# Patient Record
Sex: FEMALE | Race: WHITE | NOT HISPANIC OR LATINO | Employment: UNEMPLOYED | URBAN - METROPOLITAN AREA
[De-identification: names, ages, dates, MRNs, and addresses within clinical notes are randomized per-mention and may not be internally consistent; named-entity substitution may affect disease eponyms.]

---

## 2022-10-12 ENCOUNTER — TELEPHONE (OUTPATIENT)
Dept: SPORTS MEDICINE | Facility: CLINIC | Age: 67
End: 2022-10-12
Payer: MEDICARE

## 2022-10-12 NOTE — TELEPHONE ENCOUNTER
----- Message from Adam Gilbert sent at 10/12/2022  2:36 PM CDT -----  Regarding: PT'S REQUESTING A CALL REGARDING SCHEDULING FOR RIGHT LEG PAIN  Contact: PT  Pt's requesting call back...     .Confirmed contact info below:  Contact Name: Karlie Carrion  Phone Number: [phone]

## 2022-10-12 NOTE — TELEPHONE ENCOUNTER
Called and spoke to patient.  She had knee surgery about 30 years ago and is starting to have issues again.  She would like to get this looked at and Dr. Huggins was recommended

## 2022-10-17 ENCOUNTER — TELEPHONE (OUTPATIENT)
Dept: SPORTS MEDICINE | Facility: CLINIC | Age: 67
End: 2022-10-17
Payer: MEDICARE

## 2022-10-17 NOTE — TELEPHONE ENCOUNTER
Called and spoke to patient.  She had surgery for torn mensicus 30 years ago. Patient states she hurt her knee in April and wanted to seek a non surgical opinion.  Patient will arrive at 10:15am to have xray done.

## 2022-10-17 NOTE — TELEPHONE ENCOUNTER
----- Message from Adam Gilbert sent at 10/17/2022  3:55 PM CDT -----  Regarding: PT HAD A TORN MENISCUS IN RIGHT KNEE  Contact: PT  Pt's returning a call from staff..     Confirmed contact info below:  Contact Name: Karlie Carrion  Phone Number: [phone]

## 2022-10-18 DIAGNOSIS — M25.561 PAIN IN BOTH KNEES, UNSPECIFIED CHRONICITY: Primary | ICD-10-CM

## 2022-10-18 DIAGNOSIS — M25.562 PAIN IN BOTH KNEES, UNSPECIFIED CHRONICITY: Primary | ICD-10-CM
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
## 2022-12-01 ENCOUNTER — TELEPHONE (OUTPATIENT)
Dept: PRIMARY CARE CLINIC | Facility: CLINIC | Age: 67
End: 2022-12-01

## 2022-12-01 ENCOUNTER — OFFICE VISIT (OUTPATIENT)
Dept: PRIMARY CARE CLINIC | Facility: CLINIC | Age: 67
End: 2022-12-01
Payer: MEDICARE

## 2022-12-01 VITALS
DIASTOLIC BLOOD PRESSURE: 72 MMHG | HEART RATE: 55 BPM | BODY MASS INDEX: 25.97 KG/M2 | HEIGHT: 62 IN | SYSTOLIC BLOOD PRESSURE: 136 MMHG | TEMPERATURE: 98 F | WEIGHT: 141.13 LBS | OXYGEN SATURATION: 98 %

## 2022-12-01 DIAGNOSIS — N95.9 UNSPECIFIED MENOPAUSAL AND PERIMENOPAUSAL DISORDER: ICD-10-CM

## 2022-12-01 DIAGNOSIS — R53.83 FATIGUE, UNSPECIFIED TYPE: ICD-10-CM

## 2022-12-01 DIAGNOSIS — Z12.39 ENCOUNTER FOR SCREENING FOR MALIGNANT NEOPLASM OF BREAST, UNSPECIFIED SCREENING MODALITY: ICD-10-CM

## 2022-12-01 DIAGNOSIS — D22.9 MULTIPLE ATYPICAL SKIN MOLES: ICD-10-CM

## 2022-12-01 DIAGNOSIS — Z78.0 MENOPAUSE: ICD-10-CM

## 2022-12-01 DIAGNOSIS — I83.93 VARICOSE VEINS OF BOTH LOWER EXTREMITIES, UNSPECIFIED WHETHER COMPLICATED: ICD-10-CM

## 2022-12-01 DIAGNOSIS — M84.371S STRESS FRACTURE OF RIGHT ANKLE, SEQUELA: ICD-10-CM

## 2022-12-01 DIAGNOSIS — Z76.89 ESTABLISHING CARE WITH NEW DOCTOR, ENCOUNTER FOR: Primary | ICD-10-CM

## 2022-12-01 DIAGNOSIS — R79.9 ABNORMAL FINDING OF BLOOD CHEMISTRY, UNSPECIFIED: ICD-10-CM

## 2022-12-01 DIAGNOSIS — R94.7 ABNORMAL RESULTS OF OTHER ENDOCRINE FUNCTION STUDIES: ICD-10-CM

## 2022-12-01 DIAGNOSIS — F17.211 NICOTINE DEPENDENCE, CIGARETTES, IN REMISSION: ICD-10-CM

## 2022-12-01 DIAGNOSIS — Z87.891 FORMER SMOKER: ICD-10-CM

## 2022-12-01 DIAGNOSIS — R73.03 PREDIABETES: ICD-10-CM

## 2022-12-01 DIAGNOSIS — Z12.31 ENCOUNTER FOR SCREENING MAMMOGRAM FOR MALIGNANT NEOPLASM OF BREAST: ICD-10-CM

## 2022-12-01 PROBLEM — M84.371A STRESS FRACTURE OF RIGHT ANKLE: Status: ACTIVE | Noted: 2022-12-01

## 2022-12-01 PROCEDURE — 99215 OFFICE O/P EST HI 40 MIN: CPT | Mod: PBBFAC,PN | Performed by: STUDENT IN AN ORGANIZED HEALTH CARE EDUCATION/TRAINING PROGRAM

## 2022-12-01 PROCEDURE — 99999 PR PBB SHADOW E&M-EST. PATIENT-LVL V: CPT | Mod: PBBFAC,,, | Performed by: STUDENT IN AN ORGANIZED HEALTH CARE EDUCATION/TRAINING PROGRAM

## 2022-12-01 PROCEDURE — 99999 PR PBB SHADOW E&M-EST. PATIENT-LVL V: ICD-10-PCS | Mod: PBBFAC,,, | Performed by: STUDENT IN AN ORGANIZED HEALTH CARE EDUCATION/TRAINING PROGRAM

## 2022-12-01 PROCEDURE — 99204 OFFICE O/P NEW MOD 45 MIN: CPT | Mod: S$PBB,,, | Performed by: STUDENT IN AN ORGANIZED HEALTH CARE EDUCATION/TRAINING PROGRAM

## 2022-12-01 PROCEDURE — 99204 PR OFFICE/OUTPT VISIT, NEW, LEVL IV, 45-59 MIN: ICD-10-PCS | Mod: S$PBB,,, | Performed by: STUDENT IN AN ORGANIZED HEALTH CARE EDUCATION/TRAINING PROGRAM

## 2022-12-01 NOTE — TELEPHONE ENCOUNTER
----- Message from Anu Ashraf sent at 12/1/2022  3:32 PM CST -----  Pt is wanting all her referral test put in today done at [facility] will you please send the external referrals to [facility] please.    Thanks

## 2022-12-06 ENCOUNTER — PATIENT MESSAGE (OUTPATIENT)
Dept: PRIMARY CARE CLINIC | Facility: CLINIC | Age: 67
End: 2022-12-06
Payer: MEDICARE

## 2022-12-06 ENCOUNTER — LAB VISIT (OUTPATIENT)
Dept: LAB | Facility: HOSPITAL | Age: 67
End: 2022-12-06
Payer: MEDICARE

## 2022-12-06 DIAGNOSIS — E78.5 HYPERLIPIDEMIA, UNSPECIFIED HYPERLIPIDEMIA TYPE: Primary | ICD-10-CM

## 2022-12-06 DIAGNOSIS — R94.7 ABNORMAL RESULTS OF OTHER ENDOCRINE FUNCTION STUDIES: ICD-10-CM

## 2022-12-06 DIAGNOSIS — Z76.89 ESTABLISHING CARE WITH NEW DOCTOR, ENCOUNTER FOR: ICD-10-CM

## 2022-12-06 DIAGNOSIS — M84.371S STRESS FRACTURE OF RIGHT ANKLE, SEQUELA: ICD-10-CM

## 2022-12-06 DIAGNOSIS — R73.03 PREDIABETES: ICD-10-CM

## 2022-12-06 DIAGNOSIS — R79.9 ABNORMAL FINDING OF BLOOD CHEMISTRY, UNSPECIFIED: ICD-10-CM

## 2022-12-06 DIAGNOSIS — R53.83 FATIGUE, UNSPECIFIED TYPE: ICD-10-CM

## 2022-12-06 LAB
25(OH)D3+25(OH)D2 SERPL-MCNC: 16 NG/ML (ref 30–96)
ALBUMIN SERPL BCP-MCNC: 3.8 G/DL (ref 3.5–5.2)
ALP SERPL-CCNC: 75 U/L (ref 55–135)
ALT SERPL W/O P-5'-P-CCNC: 23 U/L (ref 10–44)
ANION GAP SERPL CALC-SCNC: 8 MMOL/L (ref 8–16)
AST SERPL-CCNC: 20 U/L (ref 10–40)
BASOPHILS # BLD AUTO: 0.07 K/UL (ref 0–0.2)
BASOPHILS NFR BLD: 0.9 % (ref 0–1.9)
BILIRUB SERPL-MCNC: 0.8 MG/DL (ref 0.1–1)
BUN SERPL-MCNC: 15 MG/DL (ref 8–23)
CALCIUM SERPL-MCNC: 9.6 MG/DL (ref 8.7–10.5)
CHLORIDE SERPL-SCNC: 108 MMOL/L (ref 95–110)
CHOLEST SERPL-MCNC: 266 MG/DL (ref 120–199)
CHOLEST/HDLC SERPL: 6.2 {RATIO} (ref 2–5)
CO2 SERPL-SCNC: 23 MMOL/L (ref 23–29)
CREAT SERPL-MCNC: 0.8 MG/DL (ref 0.5–1.4)
DIFFERENTIAL METHOD: NORMAL
EOSINOPHIL # BLD AUTO: 0.1 K/UL (ref 0–0.5)
EOSINOPHIL NFR BLD: 1.1 % (ref 0–8)
ERYTHROCYTE [DISTWIDTH] IN BLOOD BY AUTOMATED COUNT: 12.3 % (ref 11.5–14.5)
EST. GFR  (NO RACE VARIABLE): >60 ML/MIN/1.73 M^2
ESTIMATED AVG GLUCOSE: 105 MG/DL (ref 68–131)
GLUCOSE SERPL-MCNC: 88 MG/DL (ref 70–110)
HBA1C MFR BLD: 5.3 % (ref 4–5.6)
HCT VFR BLD AUTO: 43.7 % (ref 37–48.5)
HCV AB SERPL QL IA: NORMAL
HDLC SERPL-MCNC: 43 MG/DL (ref 40–75)
HDLC SERPL: 16.2 % (ref 20–50)
HGB BLD-MCNC: 14.3 G/DL (ref 12–16)
HIV 1+2 AB+HIV1 P24 AG SERPL QL IA: NORMAL
IMM GRANULOCYTES # BLD AUTO: 0.02 K/UL (ref 0–0.04)
IMM GRANULOCYTES NFR BLD AUTO: 0.2 % (ref 0–0.5)
LDLC SERPL CALC-MCNC: 197.2 MG/DL (ref 63–159)
LYMPHOCYTES # BLD AUTO: 2.8 K/UL (ref 1–4.8)
LYMPHOCYTES NFR BLD: 34.6 % (ref 18–48)
MCH RBC QN AUTO: 30.1 PG (ref 27–31)
MCHC RBC AUTO-ENTMCNC: 32.7 G/DL (ref 32–36)
MCV RBC AUTO: 92 FL (ref 82–98)
MONOCYTES # BLD AUTO: 0.6 K/UL (ref 0.3–1)
MONOCYTES NFR BLD: 7.3 % (ref 4–15)
NEUTROPHILS # BLD AUTO: 4.5 K/UL (ref 1.8–7.7)
NEUTROPHILS NFR BLD: 55.9 % (ref 38–73)
NONHDLC SERPL-MCNC: 223 MG/DL
NRBC BLD-RTO: 0 /100 WBC
PLATELET # BLD AUTO: 260 K/UL (ref 150–450)
PMV BLD AUTO: 11.2 FL (ref 9.2–12.9)
POTASSIUM SERPL-SCNC: 3.9 MMOL/L (ref 3.5–5.1)
PROT SERPL-MCNC: 6.9 G/DL (ref 6–8.4)
RBC # BLD AUTO: 4.75 M/UL (ref 4–5.4)
SODIUM SERPL-SCNC: 139 MMOL/L (ref 136–145)
TRIGL SERPL-MCNC: 129 MG/DL (ref 30–150)
TSH SERPL DL<=0.005 MIU/L-ACNC: 1.68 UIU/ML (ref 0.4–4)
WBC # BLD AUTO: 8.11 K/UL (ref 3.9–12.7)

## 2022-12-06 PROCEDURE — 80061 LIPID PANEL: CPT | Performed by: STUDENT IN AN ORGANIZED HEALTH CARE EDUCATION/TRAINING PROGRAM

## 2022-12-06 PROCEDURE — 82306 VITAMIN D 25 HYDROXY: CPT | Performed by: STUDENT IN AN ORGANIZED HEALTH CARE EDUCATION/TRAINING PROGRAM

## 2022-12-06 PROCEDURE — 85025 COMPLETE CBC W/AUTO DIFF WBC: CPT | Performed by: STUDENT IN AN ORGANIZED HEALTH CARE EDUCATION/TRAINING PROGRAM

## 2022-12-06 PROCEDURE — 87389 HIV-1 AG W/HIV-1&-2 AB AG IA: CPT | Performed by: STUDENT IN AN ORGANIZED HEALTH CARE EDUCATION/TRAINING PROGRAM

## 2022-12-06 PROCEDURE — 36415 COLL VENOUS BLD VENIPUNCTURE: CPT | Mod: PN | Performed by: STUDENT IN AN ORGANIZED HEALTH CARE EDUCATION/TRAINING PROGRAM

## 2022-12-06 PROCEDURE — 83036 HEMOGLOBIN GLYCOSYLATED A1C: CPT | Performed by: STUDENT IN AN ORGANIZED HEALTH CARE EDUCATION/TRAINING PROGRAM

## 2022-12-06 PROCEDURE — 80053 COMPREHEN METABOLIC PANEL: CPT | Performed by: STUDENT IN AN ORGANIZED HEALTH CARE EDUCATION/TRAINING PROGRAM

## 2022-12-06 PROCEDURE — 84443 ASSAY THYROID STIM HORMONE: CPT | Performed by: STUDENT IN AN ORGANIZED HEALTH CARE EDUCATION/TRAINING PROGRAM

## 2022-12-06 PROCEDURE — 86803 HEPATITIS C AB TEST: CPT | Performed by: STUDENT IN AN ORGANIZED HEALTH CARE EDUCATION/TRAINING PROGRAM

## 2022-12-06 RX ORDER — ATORVASTATIN CALCIUM 40 MG/1
40 TABLET, FILM COATED ORAL NIGHTLY
Qty: 90 TABLET | Refills: 0 | Status: SHIPPED | OUTPATIENT
Start: 2022-12-06 | End: 2022-12-07

## 2022-12-06 NOTE — PROGRESS NOTES
"Hello,     Based on your recent labs, Vitamin-D is low. It is recommended that you take 1000 units (IU) daily of vitamin D3; this is available over-the-counter.      Your total and "bad cholesterol" (LDL) is high. It is recommended that you start a cholesterol medication based on these labs to help prevent things in the future like heart attacks and strokes.     One possible side effect of cholesterol medication is muscle aches but please keep in mind that most people are able to tolerate the cholesterol medication (called a statin) well.    Taking the cholesterol medication at bedtime minimizes the risk of muscle aches. If you experience this, sometimes adding an OTC CoQ-10 supplement can help as well. If you are having muscle aches please let me know as we can try different versions of the medication to help. We can also try a medicine called zetia which works differently than a statin.    Taking the cholesterol medication does not replace the need to diet and exercise so please continue to keep up with healthy habits. Foods like nuts, avocados, and salmon, can help with raising "good cholesterol." Increased fiber, fresh veggies, fruits, and limiting alcohol, fatty meats/dairy, processed carbs can help lower "bad" cholesterol as well. Exercise is advised as well. Stop smoking if you are.     If you agree to starting the cholesterol medication; please let us know. I could send a prescription to your pharmacy (may substitute based on affordability). We usually check your labs in 1 month to make sure you are doing well and then check the lipids in 6m and again in 12m and then yearly thereafter once controlled. The labs in 1 month are ordered. We can adjust the dose/add as needed to provide the most protection. Anyone who is a diabetic should be on one of the medications for protection as well.    Please let me know if you have questions about this.        Narinder,     Dr. Acosta"

## 2022-12-07 ENCOUNTER — TELEPHONE (OUTPATIENT)
Dept: PRIMARY CARE CLINIC | Facility: CLINIC | Age: 67
End: 2022-12-07
Payer: MEDICARE

## 2022-12-07 NOTE — TELEPHONE ENCOUNTER
Pt reports GI SE from statins in the past. Will try lifestyle and re-check 6m. Consider zetia in future. Pt would prefer non pharmaceutical options.

## 2022-12-07 NOTE — TELEPHONE ENCOUNTER
----- Message from Almaz Astudillo sent at 12/7/2022  3:55 PM CST -----  Contact: Anitra with [facility]  ref#[id number]  Anitra states she received a Cologuard order for the pt and has a question about the ICD 10 code. Please call and advise.

## 2022-12-13 LAB
BCS RECOMMENDATION EXT: NORMAL
BMD RECOMMENDATION EXT: NORMAL

## 2022-12-22 ENCOUNTER — PATIENT MESSAGE (OUTPATIENT)
Dept: ADMINISTRATIVE | Facility: HOSPITAL | Age: 67
End: 2022-12-22
Payer: MEDICARE

## 2023-01-03 LAB — NONINV COLON CA DNA+OCC BLD SCRN STL QL: POSITIVE

## 2023-01-04 ENCOUNTER — PATIENT MESSAGE (OUTPATIENT)
Dept: PRIMARY CARE CLINIC | Facility: CLINIC | Age: 68
End: 2023-01-04
Payer: MEDICARE

## 2023-01-04 DIAGNOSIS — R19.5 POSITIVE COLORECTAL CANCER SCREENING USING COLOGUARD TEST: Primary | ICD-10-CM

## 2023-01-04 NOTE — PROGRESS NOTES
Hi,     Could you please contact the patient and let them know that their results?    Her cologuard was positive. This means the test found something and she should have a diagnostic colonoscopy. I will place that order.     Is she having any symptoms of blood in stools, weight loss, abdominal pain, change in Bms?    Dr. Acosta

## 2023-01-05 ENCOUNTER — TELEPHONE (OUTPATIENT)
Dept: ENDOSCOPY | Facility: HOSPITAL | Age: 68
End: 2023-01-05
Payer: MEDICARE

## 2023-01-08 ENCOUNTER — PATIENT MESSAGE (OUTPATIENT)
Dept: PRIMARY CARE CLINIC | Facility: CLINIC | Age: 68
End: 2023-01-08
Payer: MEDICARE

## 2023-01-13 ENCOUNTER — PATIENT OUTREACH (OUTPATIENT)
Dept: ADMINISTRATIVE | Facility: HOSPITAL | Age: 68
End: 2023-01-13
Payer: MEDICARE

## 2023-01-13 NOTE — PROGRESS NOTES
Health Maintenance Due   Topic Date Due    TETANUS VACCINE  Never done    LDCT Lung Screen  Never done    Shingles Vaccine (1 of 2) Never done    Pneumococcal Vaccines (Age 65+) (1 - PCV) Never done      Chart review done.   HM updated.   Immunizations reviewed & updated.   Care Everywhere updated.  [facility] reviewed  Orders Entered:  NA

## 2023-01-18 ENCOUNTER — TELEPHONE (OUTPATIENT)
Dept: PRIMARY CARE CLINIC | Facility: CLINIC | Age: 68
End: 2023-01-18

## 2023-01-18 NOTE — TELEPHONE ENCOUNTER
Pt advised per Dr. Acosta's message below:    Her bone density did show some osteoporosis. Sometimes we use medicines for this but I know she prefers to do things without pills. If she does want to consider treatment please do let me know. To help, calcium, vitamin D, and weight-bearing exercise are advised.     Pt advised that she recently started taking Vitamin D & Calcium again but is unable to do weight bearing exercise due to broken toes. Pt is willing to discuss osteoporosis treatment at  on 01/20/23. Pt is also willing to discuss statin therapy if her methods of lowering cholesterol without medication is unsuccessful. She did  statin but does not plan to take it yet.     Pt saw Dr. Matute today regarding the colonoscopy. He sent to cardiology for clearance due to SOB & cough prior to scheduling the colonoscopy. Pt seeing Cardiologist tomorrow Dr. Ala M Mohsen.    Saw Dr. Matute today

## 2023-01-18 NOTE — TELEPHONE ENCOUNTER
----- Message from Neelima Judd sent at 1/18/2023  4:35 PM CST -----  Contact: pt [phone]  Patient is returning a phone call.  Who left a message for the patient: pt not sure  Does patient know what this is regarding:  test results  Would you like a call back, or a response through your [facility] portal?:   call  Comments:

## 2023-01-20 ENCOUNTER — OFFICE VISIT (OUTPATIENT)
Dept: PRIMARY CARE CLINIC | Facility: CLINIC | Age: 68
End: 2023-01-20
Payer: MEDICARE

## 2023-01-20 DIAGNOSIS — E78.5 HYPERLIPIDEMIA, UNSPECIFIED HYPERLIPIDEMIA TYPE: Primary | ICD-10-CM

## 2023-01-20 DIAGNOSIS — M81.0 AGE-RELATED OSTEOPOROSIS WITHOUT CURRENT PATHOLOGICAL FRACTURE: ICD-10-CM

## 2023-01-20 DIAGNOSIS — R19.5 POSITIVE COLORECTAL CANCER SCREENING USING COLOGUARD TEST: ICD-10-CM

## 2023-01-20 DIAGNOSIS — J45.20 MILD INTERMITTENT REACTIVE AIRWAY DISEASE WITHOUT COMPLICATION: ICD-10-CM

## 2023-01-20 DIAGNOSIS — M84.371S STRESS FRACTURE OF RIGHT ANKLE, SEQUELA: ICD-10-CM

## 2023-01-20 DIAGNOSIS — Z87.891 FORMER SMOKER: ICD-10-CM

## 2023-01-20 PROCEDURE — 99213 OFFICE O/P EST LOW 20 MIN: CPT | Mod: 95,,, | Performed by: STUDENT IN AN ORGANIZED HEALTH CARE EDUCATION/TRAINING PROGRAM

## 2023-01-20 PROCEDURE — 99213 PR OFFICE/OUTPT VISIT, EST, LEVL III, 20-29 MIN: ICD-10-PCS | Mod: 95,,, | Performed by: STUDENT IN AN ORGANIZED HEALTH CARE EDUCATION/TRAINING PROGRAM

## 2023-01-20 RX ORDER — ALBUTEROL SULFATE 90 UG/1
2 AEROSOL, METERED RESPIRATORY (INHALATION) EVERY 6 HOURS PRN
Qty: 18 G | Refills: 3 | Status: SHIPPED | OUTPATIENT
Start: 2023-01-20 | End: 2023-02-28

## 2023-01-20 RX ORDER — ATORVASTATIN CALCIUM 40 MG/1
40 TABLET, FILM COATED ORAL
COMMUNITY
Start: 2022-12-06 | End: 2023-03-03

## 2023-01-20 NOTE — PROGRESS NOTES
The patient location is: [place]  The chief complaint leading to consultation is: Abnormal DEXA     Visit type: audiovisual    Karlie Carrion  1955        Subjective     Chief Complaint: Osteoporosis    History of Present Illness:  Ms. Karlie Carrion is a 67 y.o. female who presents for virtual visit for follow-up.    Her DEXA done at outside facility showed osteoporosis.  She currently has a stress fracture that she is working with specialist for.  Does not think this was related to an injury.  Thinks her grandmother may have had this as well.  Long discussion regarding treatment options.  Risks and benefits.  She is currently taking calcium and vitamin-D.  Has been given the clearance to swim from her specialist.  Still nonweightbearing on that foot.    In terms of her high cholesterol.  States she did have a few months where she was eating much more than she should have, fried foods, cakes, sweets periods has changed her diet.  Has atorvastatin 40, not yet taken.  In the past she has had diarrhea with statins.  Feels it flared her IBS.  She can not recall which statin she had in the past that caused this.  She is open to trying the atorvastatin.  Discussed possible side effects of myalgias.  Can try with Co Q10 over-the-counter if needed, can also switch different brand, can also try Zetia.    She is a former smoker.  Feels she has a cough since having an upper respiratory infection few months ago.  Has been on albuterol in the past which helped.    At her lung CT scan and AAA screen done at [facility], we will try and obtain records.    Review of Systems   Constitutional:  Negative for chills, fever, malaise/fatigue and weight loss.   HENT:  Negative for hearing loss.    Eyes:  Negative for discharge.   Respiratory:  Negative for wheezing.    Cardiovascular:  Negative for chest pain and palpitations.   Gastrointestinal:  Negative for blood in stool, constipation, diarrhea and vomiting.   Genitourinary:  Negative for  dysuria and hematuria.   Musculoskeletal:  Negative for neck pain.   Neurological:  Negative for weakness and headaches.   Endo/Heme/Allergies:  Negative for polydipsia.      PAST HISTORY:     Past Medical History:   Diagnosis Date    HLD (hyperlipidemia)     Osteoporosis        No past surgical history on file.    No family history on file.      MEDICATIONS & ALLERGIES:     Current Outpatient Medications on File Prior to Visit   Medication Sig    atorvastatin (LIPITOR) 40 MG tablet Take 40 mg by mouth.     No current facility-administered medications on file prior to visit.       Review of patient's allergies indicates:   Allergen Reactions    Hay fever and allergy relief     Statins-hmg-coa reductase inhibitors Diarrhea and Nausea And Vomiting     GI issues       OBJECTIVE:     There is no height or weight on file to calculate BMI.     Physical Exam:  Physical Exam  Constitutional:       General: She is not in acute distress.     Appearance: Normal appearance. She is not ill-appearing, toxic-appearing or diaphoretic.      Comments: Limited 2/2 Virtual Exam   HENT:      Head: Normocephalic and atraumatic.   Eyes:      Conjunctiva/sclera: Conjunctivae normal.   Pulmonary:      Effort: Pulmonary effort is normal. No respiratory distress.   Neurological:      Mental Status: She is alert and oriented to person, place, and time. Mental status is at baseline.   Psychiatric:         Mood and Affect: Mood normal.         Behavior: Behavior normal.          Laboratory  Lab Results   Component Value Date    WBC 8.11 12/06/2022    HGB 14.3 12/06/2022    HCT 43.7 12/06/2022    MCV 92 12/06/2022     12/06/2022     Lab Results   Component Value Date    GLU 88 12/06/2022     12/06/2022    K 3.9 12/06/2022     12/06/2022    CO2 23 12/06/2022    BUN 15 12/06/2022    CREATININE 0.8 12/06/2022    CALCIUM 9.6 12/06/2022     No results found for: INR, PROTIME  Lab Results   Component Value Date    HGBA1C 5.3  12/06/2022             ASSESSMENT & PLAN:   Ms. Karlie Carrion is a 67 y.o. female who was seen today for follow-up.      1. Hyperlipidemia, unspecified hyperlipidemia type  -     Comprehensive Metabolic Panel; Future; Expected date: 02/20/2023  - long discussion today regarding risks and benefits of statin.  Patient is willing to try it.  She will let me know how she is doing with it.  Can try over-the-counter Co Q10.  We will check LFTs in 4-6 weeks.    2. Mild intermittent reactive airway disease without complication  -     albuterol (VENTOLIN HFA) 90 mcg/actuation inhaler; Inhale 2 puffs into the lungs every 6 (six) hours as needed for Wheezing (Cough). Rescue  Dispense: 18 g; Refill: 3    3. Positive colorectal cancer screening using Cologuard test  -seeing [facility].  Planning on having colonoscopy done.    4. Stress fracture of right ankle, sequela  -following with specialists, currently non-weightbearing.  Planning on starting to swim again.    5. Age-related osteoporosis without current pathological fracture  -long discussion today regarding risks and benefits of treatment.  At this time, she would like to continue with vitamin-D and calcium.  Also will do nonweightbearing exercises to help, such as yoga and swimming.  Will recheck in 1 year.  If bone mineral density decreases, she would be interested in starting pharmacotherapy    6. Former smoker  Will obtain results of CT scan and AAA from LAURI Acosta MD  Internal Medicine          Face to Face time with patient: 20  30 minutes of total time spent on the encounter, which includes face to face time and non-face to face time preparing to see the patient (eg, review of tests), Obtaining and/or reviewing separately obtained history, Documenting clinical information in the electronic or other health record, Independently interpreting results (not separately reported) and communicating results to the patient/family/caregiver, or Care coordination (not  separately reported).         Each patient to whom he or she provides medical services by telemedicine is:  (1) informed of the relationship between the physician and patient and the respective role of any other health care provider with respect to management of the patient; and (2) notified that he or she may decline to receive medical services by telemedicine and may withdraw from such care at any time.    Answers submitted by the patient for this visit:  Review of Systems Questionnaire (Submitted on 1/19/2023)  activity change: No  unexpected weight change: No  rhinorrhea: No  trouble swallowing: No  visual disturbance: No  chest tightness: No  polyuria: No  difficulty urinating: No  menstrual problem: No  joint swelling: No  arthralgias: No  confusion: No  dysphoric mood: No

## 2023-01-23 ENCOUNTER — TELEPHONE (OUTPATIENT)
Dept: PRIMARY CARE CLINIC | Facility: CLINIC | Age: 68
End: 2023-01-23
Payer: MEDICARE

## 2023-01-23 DIAGNOSIS — Z86.19 HISTORY OF COCCIDIOIDOMYCOSIS: ICD-10-CM

## 2023-01-23 DIAGNOSIS — Z87.891 FORMER SMOKER: ICD-10-CM

## 2023-01-23 DIAGNOSIS — R91.8 RIGHT LOWER LOBE LUNG MASS: Primary | ICD-10-CM

## 2023-01-23 NOTE — TELEPHONE ENCOUNTER
Patient had an initial low-dose lung scan for her smoking history.  Done through [facility].  Per report (see above), 1.6 cm irregular mass noted right lower lobe.  Enlarged lymph nodes also noted on right hilum.  Called patient to discuss.  She has had a chronic cough, worse after upper respiratory illness few months ago.  Diagnosed with [place] Fever almost 20 years ago.     Discussed possibility of fungal infection vs mass.  We will put urgent referral to pulmonology.  Likely will need some sort of procedure/bx, but will defer to pulmonology.  Asked patient to please obtain images from [facility] prior to that appointment.    Kasia Acosta MD  Internal Medicine   [facility]

## 2023-01-23 NOTE — TELEPHONE ENCOUNTER
----- Message from Neelima Judd sent at 1/23/2023 11:04 AM CST -----  Contact: maximino [phone]  Lizbeth RIVERA @ [phone] ext [phone] is returning a call re: incomplete  fax.  Lizbeth fax info 12/13/22.  Please call and advise.    Thank you and have a great day.

## 2023-01-31 ENCOUNTER — NURSE TRIAGE (OUTPATIENT)
Dept: ADMINISTRATIVE | Facility: CLINIC | Age: 68
End: 2023-01-31
Payer: MEDICARE

## 2023-01-31 ENCOUNTER — PATIENT MESSAGE (OUTPATIENT)
Dept: PRIMARY CARE CLINIC | Facility: CLINIC | Age: 68
End: 2023-01-31
Payer: MEDICARE

## 2023-01-31 DIAGNOSIS — U07.1 COVID-19: Primary | ICD-10-CM

## 2023-01-31 RX ORDER — BENZONATATE 200 MG/1
200 CAPSULE ORAL 3 TIMES DAILY PRN
Qty: 30 CAPSULE | Refills: 0 | Status: SHIPPED | OUTPATIENT
Start: 2023-01-31 | End: 2023-02-10

## 2023-01-31 RX ORDER — ALBUTEROL SULFATE 90 UG/1
2 AEROSOL, METERED RESPIRATORY (INHALATION) EVERY 6 HOURS PRN
Qty: 18 G | Refills: 0 | Status: SHIPPED | OUTPATIENT
Start: 2023-01-31

## 2023-01-31 NOTE — TELEPHONE ENCOUNTER
Symptoms started yesterday and tested positive today. Fever, aches, runny nose, sneezing, cough,sweating, exhausted. Difficulty breathing at rest. Chest pain and pressure. Care advice recommend pt go to Er. Pt refused. Pt stated she was going to call her pcp and see if she could take care of it.   Reason for Disposition   MODERATE difficulty breathing (e.g., speaks in phrases, SOB even at rest, pulse 100-120)    Additional Information   Negative: SEVERE difficulty breathing (e.g., struggling for each breath, speaks in single words)   Negative: Difficult to awaken or acting confused (e.g., disoriented, slurred speech)   Negative: Bluish (or gray) lips or face now   Negative: Shock suspected (e.g., cold/pale/clammy skin, too weak to stand, low BP, rapid pulse)   Negative: Sounds like a life-threatening emergency to the triager   Negative: SEVERE or constant chest pain or pressure  (Exception: Mild central chest pain, present only when coughing.)    Protocols used: Coronavirus (COVID-19) Diagnosed or [id number]

## 2023-02-01 ENCOUNTER — PATIENT MESSAGE (OUTPATIENT)
Dept: PRIMARY CARE CLINIC | Facility: CLINIC | Age: 68
End: 2023-02-01
Payer: MEDICARE

## 2023-02-15 ENCOUNTER — PATIENT MESSAGE (OUTPATIENT)
Dept: PRIMARY CARE CLINIC | Facility: CLINIC | Age: 68
End: 2023-02-15
Payer: MEDICARE

## 2023-02-15 NOTE — TELEPHONE ENCOUNTER
Spoke With Pt Schedule Her A virtual Visit Dr. Acosta.  Told Pt If abdominal pain Keep up she need to go To UC .

## 2023-02-16 ENCOUNTER — HOSPITAL ENCOUNTER (EMERGENCY)
Facility: HOSPITAL | Age: 68
Discharge: HOME | End: 2023-02-16
Attending: STUDENT IN AN ORGANIZED HEALTH CARE EDUCATION/TRAINING PROGRAM
Payer: MEDICARE

## 2023-02-16 ENCOUNTER — PATIENT MESSAGE (OUTPATIENT)
Dept: PRIMARY CARE CLINIC | Facility: CLINIC | Age: 68
End: 2023-02-16
Payer: MEDICARE

## 2023-02-16 VITALS
TEMPERATURE: 98 F | RESPIRATION RATE: 18 BRPM | SYSTOLIC BLOOD PRESSURE: 153 MMHG | OXYGEN SATURATION: 98 % | DIASTOLIC BLOOD PRESSURE: 68 MMHG | HEART RATE: 56 BPM

## 2023-02-16 DIAGNOSIS — R91.1 PULMONARY NODULE: ICD-10-CM

## 2023-02-16 DIAGNOSIS — R10.9 ABDOMINAL PAIN: Primary | ICD-10-CM

## 2023-02-16 LAB
ALBUMIN SERPL BCP-MCNC: 3.5 G/DL (ref 3.5–5.2)
ALP SERPL-CCNC: 84 U/L (ref 55–135)
ALT SERPL W/O P-5'-P-CCNC: 18 U/L (ref 10–44)
ANION GAP SERPL CALC-SCNC: 10 MMOL/L (ref 8–16)
AST SERPL-CCNC: 17 U/L (ref 10–40)
BASOPHILS # BLD AUTO: 0.06 K/UL (ref 0–0.2)
BASOPHILS NFR BLD: 0.6 % (ref 0–1.9)
BILIRUB SERPL-MCNC: 0.5 MG/DL (ref 0.1–1)
BILIRUB UR QL STRIP: NEGATIVE
BUN SERPL-MCNC: 13 MG/DL (ref 8–23)
CALCIUM SERPL-MCNC: 9.7 MG/DL (ref 8.7–10.5)
CHLORIDE SERPL-SCNC: 108 MMOL/L (ref 95–110)
CLARITY UR REFRACT.AUTO: CLEAR
CO2 SERPL-SCNC: 25 MMOL/L (ref 23–29)
COLOR UR AUTO: YELLOW
CREAT SERPL-MCNC: 0.7 MG/DL (ref 0.5–1.4)
DIFFERENTIAL METHOD: NORMAL
EOSINOPHIL # BLD AUTO: 0.1 K/UL (ref 0–0.5)
EOSINOPHIL NFR BLD: 1.2 % (ref 0–8)
ERYTHROCYTE [DISTWIDTH] IN BLOOD BY AUTOMATED COUNT: 11.6 % (ref 11.5–14.5)
EST. GFR  (NO RACE VARIABLE): >60 ML/MIN/1.73 M^2
GLUCOSE SERPL-MCNC: 106 MG/DL (ref 70–110)
GLUCOSE UR QL STRIP: NEGATIVE
HCT VFR BLD AUTO: 40.4 % (ref 37–48.5)
HGB BLD-MCNC: 13.7 G/DL (ref 12–16)
HGB UR QL STRIP: NEGATIVE
IMM GRANULOCYTES # BLD AUTO: 0.02 K/UL (ref 0–0.04)
IMM GRANULOCYTES NFR BLD AUTO: 0.2 % (ref 0–0.5)
KETONES UR QL STRIP: NEGATIVE
LEUKOCYTE ESTERASE UR QL STRIP: NEGATIVE
LIPASE SERPL-CCNC: 17 U/L (ref 4–60)
LYMPHOCYTES # BLD AUTO: 2.8 K/UL (ref 1–4.8)
LYMPHOCYTES NFR BLD: 27.4 % (ref 18–48)
MCH RBC QN AUTO: 30.6 PG (ref 27–31)
MCHC RBC AUTO-ENTMCNC: 33.9 G/DL (ref 32–36)
MCV RBC AUTO: 90 FL (ref 82–98)
MONOCYTES # BLD AUTO: 0.8 K/UL (ref 0.3–1)
MONOCYTES NFR BLD: 7.5 % (ref 4–15)
NEUTROPHILS # BLD AUTO: 6.5 K/UL (ref 1.8–7.7)
NEUTROPHILS NFR BLD: 63.1 % (ref 38–73)
NITRITE UR QL STRIP: NEGATIVE
NRBC BLD-RTO: 0 /100 WBC
PH UR STRIP: 6 [PH] (ref 5–8)
PLATELET # BLD AUTO: 252 K/UL (ref 150–450)
PMV BLD AUTO: 11 FL (ref 9.2–12.9)
POTASSIUM SERPL-SCNC: 3.7 MMOL/L (ref 3.5–5.1)
PROT SERPL-MCNC: 7 G/DL (ref 6–8.4)
PROT UR QL STRIP: NEGATIVE
RBC # BLD AUTO: 4.47 M/UL (ref 4–5.4)
SODIUM SERPL-SCNC: 143 MMOL/L (ref 136–145)
SP GR UR STRIP: 1.01 (ref 1–1.03)
URN SPEC COLLECT METH UR: NORMAL
WBC # BLD AUTO: 10.33 K/UL (ref 3.9–12.7)

## 2023-02-16 PROCEDURE — 85025 COMPLETE CBC W/AUTO DIFF WBC: CPT | Performed by: PHYSICIAN ASSISTANT

## 2023-02-16 PROCEDURE — 25500020 PHARM REV CODE 255: Performed by: STUDENT IN AN ORGANIZED HEALTH CARE EDUCATION/TRAINING PROGRAM

## 2023-02-16 PROCEDURE — 81003 URINALYSIS AUTO W/O SCOPE: CPT | Performed by: PHYSICIAN ASSISTANT

## 2023-02-16 PROCEDURE — 99284 EMERGENCY DEPT VISIT MOD MDM: CPT | Mod: ,,, | Performed by: PHYSICIAN ASSISTANT

## 2023-02-16 PROCEDURE — 80053 COMPREHEN METABOLIC PANEL: CPT | Performed by: PHYSICIAN ASSISTANT

## 2023-02-16 PROCEDURE — 99285 EMERGENCY DEPT VISIT HI MDM: CPT | Mod: 25

## 2023-02-16 PROCEDURE — 99284 PR EMERGENCY DEPT VISIT,LEVEL IV: ICD-10-PCS | Mod: ,,, | Performed by: PHYSICIAN ASSISTANT

## 2023-02-16 PROCEDURE — 93010 EKG 12-LEAD: ICD-10-PCS | Mod: ,,, | Performed by: INTERNAL MEDICINE

## 2023-02-16 PROCEDURE — 96374 THER/PROPH/DIAG INJ IV PUSH: CPT | Mod: 59

## 2023-02-16 PROCEDURE — 93005 ELECTROCARDIOGRAM TRACING: CPT

## 2023-02-16 PROCEDURE — 63600175 PHARM REV CODE 636 W HCPCS: Performed by: PHYSICIAN ASSISTANT

## 2023-02-16 PROCEDURE — 83690 ASSAY OF LIPASE: CPT | Performed by: PHYSICIAN ASSISTANT

## 2023-02-16 PROCEDURE — 93010 ELECTROCARDIOGRAM REPORT: CPT | Mod: ,,, | Performed by: INTERNAL MEDICINE

## 2023-02-16 PROCEDURE — 25000003 PHARM REV CODE 250: Performed by: PHYSICIAN ASSISTANT

## 2023-02-16 RX ORDER — ONDANSETRON 4 MG/1
4 TABLET, ORALLY DISINTEGRATING ORAL EVERY 6 HOURS PRN
Qty: 15 TABLET | Refills: 0 | Status: SHIPPED | OUTPATIENT
Start: 2023-02-16 | End: 2023-02-28

## 2023-02-16 RX ORDER — HYOSCYAMINE SULFATE 0.12 MG/1
0.12 TABLET SUBLINGUAL EVERY 4 HOURS PRN
Qty: 40 TABLET | Refills: 0 | Status: SHIPPED | OUTPATIENT
Start: 2023-02-16 | End: 2023-02-28

## 2023-02-16 RX ORDER — HYDROCODONE BITARTRATE AND ACETAMINOPHEN 5; 325 MG/1; MG/1
1 TABLET ORAL
Status: COMPLETED | OUTPATIENT
Start: 2023-02-16 | End: 2023-02-16

## 2023-02-16 RX ORDER — ONDANSETRON 2 MG/ML
4 INJECTION INTRAMUSCULAR; INTRAVENOUS
Status: COMPLETED | OUTPATIENT
Start: 2023-02-16 | End: 2023-02-16

## 2023-02-16 RX ADMIN — ONDANSETRON 4 MG: 2 INJECTION INTRAMUSCULAR; INTRAVENOUS at 08:02

## 2023-02-16 RX ADMIN — HYDROCODONE BITARTRATE AND ACETAMINOPHEN 1 TABLET: 5; 325 TABLET ORAL at 08:02

## 2023-02-16 RX ADMIN — IOHEXOL 75 ML: 350 INJECTION, SOLUTION INTRAVENOUS at 09:02

## 2023-02-16 NOTE — ED TRIAGE NOTES
Reports abdominal cramping and pain wrapping to lower back and nausea. Reports taking gas x w/ no relief. Denies c/p, SOB, vomiting.    LOC: The patient is awake, alert and aware of environment with an appropriate affect, the patient is oriented x 3 and speaking appropriately.  APPEARANCE: Patient resting comfortably and in no acute distress, patient is clean and well groomed, patient's clothing is properly fastened.  SKIN: The skin is warm and dry, color consistent with ethnicity, patient has normal skin turgor and moist mucus membranes, skin intact, no breakdown or bruising noted.  MUSCULOSKELETAL: Patient moving all extremities spontaneously, no obvious swelling or deformities noted.  RESPIRATORY: Airway is open and patent, respirations are spontaneous, patient has a normal effort and rate, no accessory muscle use noted,   CARDIAC: Patient has a normal rate and regular rhythm, no periphreal edema noted, capillary refill < 3 seconds.  ABDOMEN: Soft and non tender to palpation, no distention noted, normoactive bowel sounds present in all four quadrants.  NEUROLOGIC:  facial expression is symmetrical, patient moving all extremities spontaneously, normal sensation in all extremities when touched with a finger.  Follows all commands appropriately.

## 2023-02-16 NOTE — ED PROVIDER NOTES
Encounter Date: 2023       History     Chief Complaint   Patient presents with    Abdominal Pain     Sent by primary care for severe abdominal pain and nausea starting Monday. Denies vomiting or diarrhea. Denies fever or chills. Also reports back pain      67 year old female with history of hyperlipidemia and osteoporosis presents for diffuse abdominal pain for about 4 days.  She reports that pain feels similar to when she had abdomen insufflated for laparoscopic surgery many years ago.  She reports associated nausea without vomiting.  She tried taking some Gas-X at home without improvement.  She feels symptoms are worse after eating certain foods that contain MSG.  She denies diarrhea, fever, chest pain or urinary symptoms.  She is not able to pass gas.  Her only prior abdominal surgery was for peritonitis associated with a fallopian tube (salpingitis?).    Review of patient's allergies indicates:   Allergen Reactions    Hay fever and allergy relief     Statins-hmg-coa reductase inhibitors Diarrhea and Nausea And Vomiting     GI issues     Past Medical History:   Diagnosis Date    HLD (hyperlipidemia)     Osteoporosis      History reviewed. No pertinent surgical history.  History reviewed. No pertinent family history.  Social History     Tobacco Use    Smoking status: Former     Packs/day: 1.50     Years: 25.00     Pack years: 37.50     Types: Cigarettes     Quit date:      Years since quittin.1    Smokeless tobacco: Never     Review of Systems   Constitutional:  Negative for fever.   Respiratory:  Negative for shortness of breath.    Cardiovascular:  Negative for chest pain.   Gastrointestinal:  Positive for abdominal pain and nausea. Negative for abdominal distention, anal bleeding, blood in stool, constipation, diarrhea, rectal pain and vomiting.   Genitourinary:  Negative for flank pain and hematuria.   Musculoskeletal:  Positive for back pain.   Allergic/Immunologic: Negative for immunocompromised  state.     Physical Exam     Initial Vitals [02/16/23 0706]   BP Pulse Resp Temp SpO2   (!) 140/90 68 18 97.9 °F (36.6 °C) 97 %      MAP       --         Physical Exam    Nursing note and vitals reviewed.  Constitutional: She appears well-developed and well-nourished. She is not diaphoretic. No distress.   HENT:   Head: Normocephalic and atraumatic.   Eyes: EOM are normal. Pupils are equal, round, and reactive to light.   Neck:   Normal range of motion.  Cardiovascular:  Normal rate, regular rhythm, normal heart sounds and intact distal pulses.     Exam reveals no gallop and no friction rub.       No murmur heard.  Pulmonary/Chest: Breath sounds normal. No respiratory distress. She has no wheezes. She has no rhonchi. She has no rales. She exhibits no tenderness.   Abdominal: Abdomen is soft. Bowel sounds are normal. She exhibits no distension and no mass. There is no abdominal tenderness. There is no rebound and no guarding.   Musculoskeletal:         General: Normal range of motion.      Cervical back: Normal range of motion.     Neurological: She is alert and oriented to person, place, and time.   Skin: Skin is warm and dry.   Psychiatric: She has a normal mood and affect.       ED Course   Procedures  Labs Reviewed   CBC W/ AUTO DIFFERENTIAL   COMPREHENSIVE METABOLIC PANEL   LIPASE   URINALYSIS, REFLEX TO URINE CULTURE    Narrative:     Specimen Source->Urine     EKG Readings: (Independently Interpreted)   Initial Reading: No STEMI. Rhythm: Sinus Bradycardia. Heart Rate: 58. Ectopy: No Ectopy. ST Segments: Normal ST Segments. Clinical Impression: Sinus Bradycardia   ECG Results              EKG 12-lead (Final result)  Result time 02/16/23 11:45:13      Final result by Interface, Lab In [facility] (02/16/23 11:45:13)                   Narrative:    Test Reason : R10.9,    Vent. Rate : 058 BPM     Atrial Rate : 058 BPM     P-R Int : 150 ms          QRS Dur : 080 ms      QT Int : 422 ms       P-R-T Axes : 000 172 162  degrees     QTc Int : 414 ms    Incorrect electrode placement  Sinus bradycardia with marked sinus arrythmia  Left axis deviation  Abnormal ECG  No previous ECGs available  Confirmed by Yared Irizarry MD ([id number]) on 2/16/2023 11:45:05 AM    Referred By:             Confirmed By:Yared Irizarry MD                                  Imaging Results               CT Abdomen Pelvis With Contrast (Final result)  Result time 02/16/23 09:38:48      Final result by Steven Estrada MD (02/16/23 09:38:48)                   Impression:      No acute abnormalities in the abdomen or pelvis.    1.5 cm right lower lobe pulmonary nodule abutting the diaphragm.  For a solid nodule >8 mm, Fleischner Society 2017 guidelines recommend considering CT, PET/CT or tissue sampling at 3 months.  Recommend follow-up with pulmonary medicine for further evaluation and treatment recommendations.    This report was flagged in Epic as abnormal.      Electronically signed by: Steven Estrada MD  Date:    02/16/2023  Time:    09:38               Narrative:    EXAMINATION:  CT ABDOMEN PELVIS WITH CONTRAST    CLINICAL HISTORY:  Abdominal pain, acute, nonlocalized;    TECHNIQUE:  Low dose axial images, sagittal and coronal reformations were obtained from the lung bases to the pubic symphysis following the IV administration of 75 mL of Omnipaque 350 .  Oral contrast was not given.    COMPARISON:  None.    FINDINGS:  1.5 cm right lower lobe pulmonary nodule.  This abuts the right hemidiaphragm but does not measure fat density to specifically indicate eventration or focal fat hernia.  Punctate left lower lobe pulmonary nodule possibly a small granuloma.    Subcentimeter hepatic hypodensities too small to characterize.  Otherwise, liver has a homogeneous attenuation.  Gallbladder shows no radiopaque stones or inflammatory changes.  No biliary ductal dilatation.  Portal vein is patent.    Stomach, spleen, pancreas and adrenal glands show no significant  abnormalities.  Mild thickening of the left adrenal without discrete nodule.    Kidneys are normal in contour and attenuation.  Mild caliectasis on the left without hydronephrosis.  Ureters show no significant abnormalities.  Urinary bladder is nondistended.  Lobular contour of the uterus with calcifications likely relating to small fibroids.  Adnexal structures show no significant abnormalities.    Small and large bowel show no acute inflammation or obstruction.  No inflammatory changes in the right lower quadrant in the region of the appendix.  No free air or ascites.    Abdominal aorta is normal in caliber with moderate infrarenal abdominal aortic atherosclerosis.  Major aortic branch vessels are patent.  Minimal nonspecific haziness of the fat surrounding the celiac artery origin which is patent.  No abnormal lymph node enlargement.    Osseous structures show advanced multilevel lumbar spondylosis with grade 1 anterolisthesis of L4 on L5.  There is mild scoliosis.  No acute fracture or bone destruction.                                       Medications   ondansetron injection 4 mg (4 mg Intravenous Given 2/16/23 0809)   HYDROcodone-acetaminophen 5-325 mg per tablet 1 tablet (1 tablet Oral Given 2/16/23 0809)   iohexoL (OMNIPAQUE 350) injection 75 mL (75 mLs Intravenous Given 2/16/23 0911)     Medical Decision Making:   History:   Old Medical Records: I decided to obtain old medical records.  Old Records Summarized: other records.       <> Summary of Records: Patient has been messaging her primary care doctor for several days regarding her symptoms.  Tested positive for COVID-19 a little over 2 weeks ago  Initial Assessment:   67-year-old female presenting for abdominal pain and distension.  She is mildly hypertensive 140/90 with otherwise normal vitals.  Nontoxic appearing.  Differential Diagnosis:   Low clinical suspicion for SBO, but given inability to pass gas and prior surgical history will obtain CT abdomen  pelvis   IBS   Food intolerance   Pancreatitis, pyelonephritis, diverticulitis felt less likely  Doubt cardiac cause  Independently Interpreted Test(s):   I have ordered and independently interpreted EKG Reading(s) - see prior notes  Clinical Tests:   Lab Tests: Ordered and Reviewed  Radiological Study: Ordered and Reviewed  Medical Tests: Ordered and Reviewed  ED Management:  Will check labs, do CT abdomen pelvis, give antiemetics, analgesics and reassess.    Symptoms improved with therapy.  Lab workup is unremarkable.  CT without acute explanation for her pain, does show pulmonary nodule.  Patient informed of incidental finding.  She already has a follow-up established with pulmonology due to history of coccidiomycosis.  Will send referral to GI for follow-up regarding her inguinal pain and instruct her to return to the ED for any worsening symptoms. Stressed the importance of follow-up, strict ED return precautions given.  Patient voiced understanding and is comfortable with discharge.  Stressed the importance of follow-up, strict ED return precautions given.  Patient voiced understanding and is comfortable with discharge.              ED Course as of 02/16/23 1558   Thu Feb 16, 2023   0748 WBC: 10.33  No leukocytosis [CC]   0748 Hemoglobin: 13.7  Not anemic [CC]   0800 EKG independently interpreted by me shows sinus bradycardia, rate 58, no STEMI. [NN]   0834 Lipase: 17 [CC]   0834 Creatinine: 0.7 [CC]   0834 BILIRUBIN TOTAL: 0.5 [CC]   0928 Leukocytes, UA: Negative [CC]   0928 CT Abdomen Pelvis With Contrast  Per my independent interpretation, no SBO [CC]   0949 CT Abdomen Pelvis With Contrast(!)  CT without any acute findings to explain her abdominal pain but does show pulmonary nodule.  Will discuss results with patient, refer to palm and GI [CC]      ED Course User Index  [CC] Bailey Mora PA-C  [NN] Susan Babb MD                 Clinical Impression:   Final diagnoses:  [R10.9] Abdominal pain  (Primary)  [R91.1] Pulmonary nodule        ED Disposition Condition    Discharge Stable          ED Prescriptions       Medication Sig Dispense Start Date End Date Auth. Provider    hyoscyamine (LEVSIN/SL) 0.125 mg Subl Place 1 tablet (0.125 mg total) under the tongue every 4 (four) hours as needed (Adominal pain). 40 tablet 2/16/2023 -- Bailey Mora PA-C    ondansetron (ZOFRAN-ODT) 4 MG TbDL Take 1 tablet (4 mg total) by mouth every 6 (six) hours as needed (Nausea). 15 tablet 2/16/2023 -- Bailey Mora PA-C          Follow-up Information       Follow up With Specialties Details Why Contact Info Additional Information    [facility] Pulmonology Schedule an appointment as soon as possible for a visit in 1 week Regarding pulmonary nodule [address] [facility] [id number] [phone] [facility], 9th Floor Please park in [facility] and use Clinic elevator    [facility] 4th Fl Gastroenterology Schedule an appointment as soon as possible for a visit in 1 week  [address] [facility] [id number] [phone] [facility] - Main [facility], 4th Floor Please park in [facility] and take [facility] elevator    [facility] - Emergency Dept Emergency Medicine Go to  If symptoms worsen [address] [facility] [id number] [phone]              Bailey Mora PA-C  02/16/23 1557

## 2023-02-16 NOTE — DISCHARGE INSTRUCTIONS
Diagnosis: Abdominal pain, pulmonary nodule    Your CT scan did not show any signs of bowel obstruction or other obvious cause for your abdominal pain.  Your lab tests did not show any concerning findings.  I suspect your abdominal pain may be due to IBS or potentially a food intolerance as you have suspected.  Your CT scan did show an incidental finding of a pulmonary nodule.  I am prescribing medicine for abdominal cramping and nausea that you can take as needed.    I sent a referral to our Gastroenterology or GI doctors for follow-up.  Please call to schedule a follow-up appointment.  You have a follow-up in place already from your primary care doctor to see a lung doctor.  Please call to schedule a follow-up appointment regarding your lung nodule.  You may need to receive additional imaging or workup for this.  If you start to have any worsening symptoms, please return to the emergency department.

## 2023-02-17 ENCOUNTER — PATIENT MESSAGE (OUTPATIENT)
Dept: PRIMARY CARE CLINIC | Facility: CLINIC | Age: 68
End: 2023-02-17
Payer: MEDICARE

## 2023-02-28 ENCOUNTER — OFFICE VISIT (OUTPATIENT)
Dept: PULMONOLOGY | Facility: CLINIC | Age: 68
End: 2023-02-28
Payer: MEDICARE

## 2023-02-28 VITALS
HEIGHT: 62 IN | OXYGEN SATURATION: 98 % | BODY MASS INDEX: 26.43 KG/M2 | WEIGHT: 143.63 LBS | SYSTOLIC BLOOD PRESSURE: 158 MMHG | HEART RATE: 61 BPM | DIASTOLIC BLOOD PRESSURE: 73 MMHG

## 2023-02-28 DIAGNOSIS — R91.8 RIGHT LOWER LOBE LUNG MASS: ICD-10-CM

## 2023-02-28 DIAGNOSIS — R91.1 NODULE OF LOWER LOBE OF RIGHT LUNG: Primary | ICD-10-CM

## 2023-02-28 DIAGNOSIS — Z87.891 FORMER SMOKER: ICD-10-CM

## 2023-02-28 DIAGNOSIS — J43.2 CENTRILOBULAR EMPHYSEMA: ICD-10-CM

## 2023-02-28 DIAGNOSIS — Z86.19 HISTORY OF COCCIDIOIDOMYCOSIS: ICD-10-CM

## 2023-02-28 PROCEDURE — 99214 OFFICE O/P EST MOD 30 MIN: CPT | Mod: PBBFAC,PO | Performed by: INTERNAL MEDICINE

## 2023-02-28 PROCEDURE — 99204 OFFICE O/P NEW MOD 45 MIN: CPT | Mod: S$PBB,,, | Performed by: INTERNAL MEDICINE

## 2023-02-28 PROCEDURE — 99999 PR PBB SHADOW E&M-EST. PATIENT-LVL IV: ICD-10-PCS | Mod: PBBFAC,,, | Performed by: INTERNAL MEDICINE

## 2023-02-28 PROCEDURE — 99999 PR PBB SHADOW E&M-EST. PATIENT-LVL IV: CPT | Mod: PBBFAC,,, | Performed by: INTERNAL MEDICINE

## 2023-02-28 PROCEDURE — 99204 PR OFFICE/OUTPT VISIT, NEW, LEVL IV, 45-59 MIN: ICD-10-PCS | Mod: S$PBB,,, | Performed by: INTERNAL MEDICINE

## 2023-02-28 NOTE — PROGRESS NOTES
"2023    [facility]  New Patient Consult    Chief Complaint   Patient presents with    Abnormal Ct Scan       HPI: Pt is a 66 yo female with HLD, osteoporosis presenting for new evaluation of imaging abnormality. Pt seen in ED on  for abd pain and on CT abd/p was found to have 1.5cm RLL nodule.  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 Was told she may have spots on her lungs due to this. Cannot remember what prior chest imaging she had.  Has a hard time maintaining high intensity exercise- gradual problem over time. Has had problem w/ her foot causing her to be inactive- had injury 2 yrs ago, going through PT now, has now noticed a bone poking through and has ortho appt later today to follow up on this.  She had covid 2023, fever once then exhaustion, sinus drainage and cough. ESPINOSA has been worse following COVID. Since covid has had loose cough- clear phlegm. Previously never had cough. Denies recurrent respiratory infections or wheezing.  She has albuterol inhaler which she rarely uses.  Smokes marijuana occasionally. Quit smoking cigarettes 8-9 yr ago. Smoked 1 ppd for a "long time." She had CT chest screening exam done in 2022 w/ incidental RLL nodule.     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  She has a degree in genetics.    The chief complaint problem is new to me.    PFSH:  Past Medical History:   Diagnosis Date    HLD (hyperlipidemia)     Osteoporosis          History reviewed. No pertinent surgical history.  Social History     Tobacco Use    Smoking status: Former     Packs/day: 1.50     Years: 25.00     Pack years: 37.50     Types: Cigarettes     Quit date:      Years since quittin.1    Smokeless tobacco: Never     History reviewed. No pertinent family history.  Review of patient's allergies indicates:   Allergen Reactions    Hay fever and allergy relief        Performance Status:The patient's " "activity level is functions out of house.      Review of Systems:  a review of eleven systems covering constitutional, Eye, HEENT, Psych, Respiratory, Cardiac, GI, , Musculoskeletal, Endocrine, Dermatologic was negative except for pertinent findings as listed ABOVE and below:  R foot pain     Exam:Comprehensive exam done. BP (!) 158/73 (BP Location: Left arm, Patient Position: Sitting, BP Method: Medium (Automatic))   Pulse 61   Ht 5' 2" (1.575 m)   Wt 65.2 kg (143 lb 10.1 oz)   SpO2 98%   BMI 26.27 kg/m²   Exam included Vitals as listed, and patient's appearance and affect and alertness and mood, oral exam for yeast and hygiene and pharynx lesions and Mallapatti (M) score, neck with inspection for jvd and masses and thyroid abnormalities and lymph nodes (supraclavicular and infraclavicular nodes and axillary also examined and noted if abn), chest exam included symmetry and effort and fremitus and percussion and auscultation, cardiac exam included rhythm and gallops and murmur and rubs and jvd and edema, abdominal exam for mass and hepatosplenomegaly and tenderness and hernias and bowel sounds, Musculoskeletal exam with muscle tone and posture and mobility/gait and  strength, and skin for rashes and cyanosis and pallor and turgor, extremity for clubbing.  Findings were normal except for pertinent findings listed below:  Oropharynx clear, M2  HR regular  Breath sounds clear bilat  R foot with protruding bone lateral dorsal foot, erythematous and tender  No edema/clubbing    Radiographs (ct chest and cxr) reviewed: view by direct vision   CT abd/p 2/16/23-   Impression:  No acute abnormalities in the abdomen or pelvis.  1.5 cm right lower lobe pulmonary nodule abutting the diaphragm.  For a solid nodule >8 mm, Fleischner Society 2017 guidelines recommend considering CT, PET/CT or tissue sampling at 3 months.  Recommend follow-up with pulmonary medicine for further evaluation and treatment " recommendations.    CT chest w/o contrast 12/13/22 DIS outside images reviewed and report reviewed-   RLL 1.6cm nodule, R hilar adenopathy? 1.4cm prevascular node on left, emphysematous changes    Labs reviewed        Latest Reference Range & Units 02/16/23 07:40   WBC 3.90 - 12.70 K/uL 10.33   RBC 4.00 - 5.40 M/uL 4.47   Hemoglobin 12.0 - 16.0 g/dL 13.7   Hematocrit 37.0 - 48.5 % 40.4   MCV 82 - 98 fL 90   MCH 27.0 - 31.0 pg 30.6   MCHC 32.0 - 36.0 g/dL 33.9   RDW 11.5 - 14.5 % 11.6   Platelets 150 - 450 K/uL 252      Latest Reference Range & Units 02/16/23 07:40   Sodium 136 - 145 mmol/L 143   Potassium 3.5 - 5.1 mmol/L 3.7   Chloride 95 - 110 mmol/L 108   CO2 23 - 29 mmol/L 25   Anion Gap 8 - 16 mmol/L 10   BUN 8 - 23 mg/dL 13   Creatinine 0.5 - 1.4 mg/dL 0.7   eGFR >60 mL/min/1.73 m^2 >60.0   Glucose 70 - 110 mg/dL 106   Calcium 8.7 - 10.5 mg/dL 9.7   Alkaline Phosphatase 55 - 135 U/L 84   PROTEIN TOTAL 6.0 - 8.4 g/dL 7.0   Albumin 3.5 - 5.2 g/dL 3.5   BILIRUBIN TOTAL 0.1 - 1.0 mg/dL 0.5   AST 10 - 40 U/L 17   ALT 10 - 44 U/L 18   Lipase 4 - 60 U/L 17       PFT will be done and results to be reviewed      Plan:  Clinical impression is reasonably certain and repeated evaluation prn +/- follow up will be needed as below. RLL nodule, stable 3 mos. Ddx includes past valley fever vs malignancy. Also has mild emphysema, asymptomatic. presenting to [facility] care    Karlie was seen today for abnormal ct scan.    Diagnoses and all orders for this visit:    Nodule of lower lobe of right lung  -     NM PET CT Routine Skull to Mid Thigh; Future  -     NM PET CT Routine Skull to Mid Thigh    Right lower lobe lung mass  -     Ambulatory referral/consult to Pulmonology    Former smoker  -     Ambulatory referral/consult to Pulmonology    History of coccidioidomycosis  -     Ambulatory referral/consult to Pulmonology    Centrilobular emphysema  -     Complete PFT with bronchodilator; Future        Follow up in about 6 weeks  (around 4/11/2023).    Discussed with patient above for education the following:      Patient Instructions   PET scan recommended to further evaluate the lung nodule  Pulmonary function testing

## 2023-03-02 ENCOUNTER — TELEPHONE (OUTPATIENT)
Dept: PULMONOLOGY | Facility: CLINIC | Age: 68
End: 2023-03-02
Payer: MEDICARE

## 2023-03-02 ENCOUNTER — PATIENT MESSAGE (OUTPATIENT)
Dept: PRIMARY CARE CLINIC | Facility: CLINIC | Age: 68
End: 2023-03-02
Payer: MEDICARE

## 2023-03-02 NOTE — TELEPHONE ENCOUNTER
Left detailed voicemail, PET scan orders were in but she declined and wanted it done at [facility] PER monserrat @ [facility]     ----- Message from Socorro Mitchell sent at 3/2/2023  8:36 AM CST -----  Contact: pt  Type:  Needs Medical Advice    Who Called: Pt  Would the patient rather a call back or a response via [facility]? call  Best Call Back Number: [phone] (home)     Additional Information: States that she would like orders for PET Scan to be put in with [facility]. States that she would also like a callback. Please advise thank you

## 2023-03-02 NOTE — TELEPHONE ENCOUNTER
Spoke to patient regarding orders.     ----- Message from Heidi Sanon sent at 3/2/2023 10:06 AM CST -----  Contact: patient  Type:  Patient Returning Call    Who Called: patient     Who Left Message for Patient:Nedra     Does the patient know what this is regarding?: schedule an appt     Would the patient rather a call back or a response via [facility]? Call     Best Call Back Number:[phone] ([place])      Additional Information:

## 2023-03-03 ENCOUNTER — OFFICE VISIT (OUTPATIENT)
Dept: GASTROENTEROLOGY | Facility: CLINIC | Age: 68
End: 2023-03-03
Payer: MEDICARE

## 2023-03-03 ENCOUNTER — HOSPITAL ENCOUNTER (OUTPATIENT)
Dept: PULMONOLOGY | Facility: CLINIC | Age: 68
Discharge: HOME | End: 2023-03-03
Payer: MEDICARE

## 2023-03-03 ENCOUNTER — PATIENT MESSAGE (OUTPATIENT)
Dept: PRIMARY CARE CLINIC | Facility: CLINIC | Age: 68
End: 2023-03-03
Payer: MEDICARE

## 2023-03-03 ENCOUNTER — TELEPHONE (OUTPATIENT)
Dept: GASTROENTEROLOGY | Facility: CLINIC | Age: 68
End: 2023-03-03
Payer: MEDICARE

## 2023-03-03 VITALS
WEIGHT: 142.88 LBS | SYSTOLIC BLOOD PRESSURE: 118 MMHG | DIASTOLIC BLOOD PRESSURE: 76 MMHG | HEIGHT: 60 IN | BODY MASS INDEX: 28.05 KG/M2 | HEART RATE: 56 BPM

## 2023-03-03 DIAGNOSIS — R10.9 ABDOMINAL PAIN, UNSPECIFIED ABDOMINAL LOCATION: Primary | ICD-10-CM

## 2023-03-03 DIAGNOSIS — R19.7 DIARRHEA, UNSPECIFIED TYPE: ICD-10-CM

## 2023-03-03 DIAGNOSIS — R19.5 POSITIVE COLORECTAL CANCER SCREENING USING COLOGUARD TEST: ICD-10-CM

## 2023-03-03 DIAGNOSIS — R10.9 ABDOMINAL PAIN: ICD-10-CM

## 2023-03-03 DIAGNOSIS — R11.0 NAUSEA: ICD-10-CM

## 2023-03-03 DIAGNOSIS — J43.2 CENTRILOBULAR EMPHYSEMA: ICD-10-CM

## 2023-03-03 DIAGNOSIS — E78.5 HYPERLIPIDEMIA, UNSPECIFIED: ICD-10-CM

## 2023-03-03 PROCEDURE — 94729 DIFFUSING CAPACITY: CPT | Mod: PBBFAC | Performed by: INTERNAL MEDICINE

## 2023-03-03 PROCEDURE — 99999 PR PBB SHADOW E&M-EST. PATIENT-LVL IV: CPT | Mod: PBBFAC,,, | Performed by: STUDENT IN AN ORGANIZED HEALTH CARE EDUCATION/TRAINING PROGRAM

## 2023-03-03 PROCEDURE — 94729 PR C02/MEMBANE DIFFUSE CAPACITY: ICD-10-PCS | Mod: 26,S$PBB,, | Performed by: INTERNAL MEDICINE

## 2023-03-03 PROCEDURE — 99204 OFFICE O/P NEW MOD 45 MIN: CPT | Mod: S$PBB,,, | Performed by: STUDENT IN AN ORGANIZED HEALTH CARE EDUCATION/TRAINING PROGRAM

## 2023-03-03 PROCEDURE — 94060 EVALUATION OF WHEEZING: CPT | Mod: PBBFAC | Performed by: INTERNAL MEDICINE

## 2023-03-03 PROCEDURE — 99214 OFFICE O/P EST MOD 30 MIN: CPT | Mod: PBBFAC | Performed by: STUDENT IN AN ORGANIZED HEALTH CARE EDUCATION/TRAINING PROGRAM

## 2023-03-03 PROCEDURE — 99204 PR OFFICE/OUTPT VISIT, NEW, LEVL IV, 45-59 MIN: ICD-10-PCS | Mod: S$PBB,,, | Performed by: STUDENT IN AN ORGANIZED HEALTH CARE EDUCATION/TRAINING PROGRAM

## 2023-03-03 PROCEDURE — 94727 PR PULM FUNCTION TEST BY GAS: ICD-10-PCS | Mod: 26,S$PBB,, | Performed by: INTERNAL MEDICINE

## 2023-03-03 PROCEDURE — 94727 GAS DIL/WSHOT DETER LNG VOL: CPT | Mod: 26,S$PBB,, | Performed by: INTERNAL MEDICINE

## 2023-03-03 PROCEDURE — 94060 PR EVAL OF BRONCHOSPASM: ICD-10-PCS | Mod: 26,S$PBB,, | Performed by: INTERNAL MEDICINE

## 2023-03-03 PROCEDURE — 99999 PR PBB SHADOW E&M-EST. PATIENT-LVL IV: ICD-10-PCS | Mod: PBBFAC,,, | Performed by: STUDENT IN AN ORGANIZED HEALTH CARE EDUCATION/TRAINING PROGRAM

## 2023-03-03 PROCEDURE — 94729 DIFFUSING CAPACITY: CPT | Mod: 26,S$PBB,, | Performed by: INTERNAL MEDICINE

## 2023-03-03 PROCEDURE — 94727 GAS DIL/WSHOT DETER LNG VOL: CPT | Mod: PBBFAC | Performed by: INTERNAL MEDICINE

## 2023-03-03 PROCEDURE — 94060 EVALUATION OF WHEEZING: CPT | Mod: 26,S$PBB,, | Performed by: INTERNAL MEDICINE

## 2023-03-03 RX ORDER — OMEPRAZOLE 40 MG/1
40 CAPSULE, DELAYED RELEASE ORAL DAILY
Qty: 30 CAPSULE | Refills: 11 | Status: SHIPPED | OUTPATIENT
Start: 2023-03-03 | End: 2023-03-03

## 2023-03-03 RX ORDER — ONDANSETRON 4 MG/1
4 TABLET, ORALLY DISINTEGRATING ORAL EVERY 8 HOURS PRN
Qty: 28 TABLET | Refills: 0 | Status: CANCELLED | OUTPATIENT
Start: 2023-03-03

## 2023-03-03 RX ORDER — ATORVASTATIN CALCIUM 40 MG/1
TABLET, FILM COATED ORAL
Qty: 90 TABLET | Refills: 0 | Status: SHIPPED | OUTPATIENT
Start: 2023-03-03 | End: 2023-03-27 | Stop reason: SDUPTHER

## 2023-03-03 NOTE — PROGRESS NOTES
[facility] Consultation Note    Reason for Consult:  Abdominal pain, nausea     PCP:   Kasia Acosta   [address] [facility] [id number]    Referring MD:  Bailey Case Pa-c  [address] [place] [address]    HPI:  This is a 67 y.o. female here for evaluation of episodic abdominal pain. PMHx of OP and HLD. She has had two recent episodes (one in 12/2022 and one last month) of severe abdominal cramping which started after eating and seems to occur at times of stress. Food triggers she has noted include cow's milk, tomatoes, MSG. She also mentions a similar pain episode 30 years ago which also came at time of stress. During the pain, she also notes more diarrhea. At baseline, her stools are mostly loose. She has 2-3 BM a day on a good day. A bad day is > 4-6. She also has issue with post prandial bloating. Her  is a  and they are interested in eating well and seem to have a very healthy diet. Not a lot of processed foods. There is no blood in stool. Denied reflux, N/v, dysphagia, hematemesis, weight loss. No fever or chills during these episodes.     She presented to ED at time of pain last month. UA, CBC, CMP and lipase were all wnl. A CT was without acute GI abnormalities. No prior endoscopy on file. Denied NSAID use. No Fh of CRC, gastric cancer, celiac or IBD. She has not had a colonoscopy in her life and did have a positive colo guard recently. Has been set up for cscope with [facility] later in the month but would prefer the procedures to be done here if possible.       ROS:  Constitutional: No fevers, chills, No weight loss  ENT: No allergies  CV: No chest pain  Pulm: No cough, No shortness of breath  Ophtho: No vision changes  GI: see HPI  Derm: No rash  Heme: No lymphadenopathy, No bruising  MSK: No arthritis  : No dysuria, No hematuria  Endo: No hot or cold intolerance  Neuro: No syncope, No seizure  Psych: No anxiety, No depression    Medical History:   has a past medical history of HLD (hyperlipidemia) and Osteoporosis.    Surgical History:  has no past surgical history on file.    Family History: family history is not on file..     Social History:  reports that she quit smoking about 9 years ago. Her smoking use included cigarettes. She has a 37.50 pack-year smoking history. She has never used smokeless tobacco.    Review of patient's allergies indicates:   Allergen Reactions    Hay fever and allergy relief        Current Outpatient Rx   Medication Sig Dispense Refill    atorvastatin (LIPITOR) 40 MG tablet TAKE 1 TABLET BY MOUTH EVERY DAY IN THE EVENING 90 tablet 0    albuterol (VENTOLIN HFA) 90 mcg/actuation inhaler Inhale 2 puffs into the lungs every 6 (six) hours as needed for Wheezing. Rescue (Patient not taking: Reported on 3/3/2023) 18 g 0       Objective Findings:    Vital Signs:  /76   Pulse (!) 56   Ht 5' (1.524 m)   Wt 64.8 kg (142 lb 13.7 oz)   BMI 27.90 kg/m²   Body mass index is 27.9 kg/m².    Physical Exam:  General Appearance: Well appearing in no acute distress  Head:   Normocephalic, without obvious abnormality  Eyes:    No scleral icterus, EOMI  ENT: Neck supple, Lips, mucosa, and tongue normal    Lungs: CTA bilaterally in anterior and posterior fields, no wheezes, no crackles.  Heart:  Regular rate and rhythm, S1, S2 normal, no murmurs heard  Abdomen: Soft, non tender, non distended with positive bowel sounds in all four quadrants. No hepatosplenomegaly, ascites, or mass  Extremities: 2+ pulses, no clubbing, cyanosis or edema  Skin: No rash  Neurologic: no focal deficits       Labs:  Lab Results   Component Value Date    WBC 10.33 02/16/2023    HGB 13.7 02/16/2023    HCT 40.4 02/16/2023     02/16/2023    CHOL 266 (H) 12/06/2022    TRIG 129 12/06/2022    HDL 43 12/06/2022    ALT 18 02/16/2023    AST 17 02/16/2023     02/16/2023    K 3.7 02/16/2023     02/16/2023    CREATININE 0.7 02/16/2023    BUN 13 02/16/2023     CO2 25 02/16/2023    TSH 1.679 12/06/2022    HGBA1C 5.3 12/06/2022       Imaging:    CT 2/2023   Impression:     No acute abnormalities in the abdomen or pelvis.     1.5 cm right lower lobe pulmonary nodule abutting the diaphragm.  For a solid nodule >8 mm, Fleischner Society 2017 guidelines recommend considering CT, PET/CT or tissue sampling at 3 months.  Recommend follow-up with pulmonary medicine for further evaluation and treatment recommendations.     This report was flagged in Epic as abnormal.       Endoscopy:    None     Assessment:    Episodic Abdominal pain   Post prandial abdominal bloating   Diarrhea   Positive colo guard       Patient with two recent episodes of severe abdominal pain which was triggered by stress and eating. No alarm features. Recent blood work and CT is unrevealing. At baseline, she also has chronic issues with diarrhea and bloating. Will check celiac and CRP. She needs cscope for positive colo guard and will plan for biopsies for microscopic colitis at same time. Plan for EGD at time of cscope for abdominal pain and bloating.      Recommendations:    - EGD with biopsies   - Cscope at same time for diarrhea and positive cologuard   - Celiac panel and CRP   - Further plan based on results and response     RTC 3 months       Order summary:  Orders Placed This Encounter    Celiac Disease Panel    C-reactive protein    Ambulatory referral/consult to Endo Procedure          Thank you so much for allowing me to participate in the care of Karlie Mcneill MD  Gastroenterology   [facility]

## 2023-03-03 NOTE — PATIENT INSTRUCTIONS
Order celiac panel and CRP blood tests     EGD and colonoscopy for abdominal pain, diarrhea and positive cologuard

## 2023-03-06 ENCOUNTER — PATIENT MESSAGE (OUTPATIENT)
Dept: PULMONOLOGY | Facility: CLINIC | Age: 68
End: 2023-03-06
Payer: MEDICARE

## 2023-03-07 ENCOUNTER — OFFICE VISIT (OUTPATIENT)
Dept: PRIMARY CARE CLINIC | Facility: CLINIC | Age: 68
End: 2023-03-07
Payer: MEDICARE

## 2023-03-07 DIAGNOSIS — R94.31 ABNORMAL EKG: Primary | ICD-10-CM

## 2023-03-07 DIAGNOSIS — J43.2 CENTRILOBULAR EMPHYSEMA: ICD-10-CM

## 2023-03-07 DIAGNOSIS — R19.5 POSITIVE COLORECTAL CANCER SCREENING USING COLOGUARD TEST: ICD-10-CM

## 2023-03-07 DIAGNOSIS — Z87.891 FORMER SMOKER: ICD-10-CM

## 2023-03-07 DIAGNOSIS — R91.8 RIGHT LOWER LOBE LUNG MASS: ICD-10-CM

## 2023-03-07 PROCEDURE — 99213 OFFICE O/P EST LOW 20 MIN: CPT | Mod: 95,,, | Performed by: STUDENT IN AN ORGANIZED HEALTH CARE EDUCATION/TRAINING PROGRAM

## 2023-03-07 PROCEDURE — 99213 PR OFFICE/OUTPT VISIT, EST, LEVL III, 20-29 MIN: ICD-10-PCS | Mod: 95,,, | Performed by: STUDENT IN AN ORGANIZED HEALTH CARE EDUCATION/TRAINING PROGRAM

## 2023-03-07 NOTE — PROGRESS NOTES
The patient location is: [place]  The chief complaint leading to consultation is: EKG    Visit type: audiovisual      Karlie Carrion  1955        Subjective     Chief Complaint: F/U    History of Present Illness:  Ms. Karlie Carrion is a 67 y.o. female who presents for virtual visit for follow-up.     PET scan planned with Pulm for her nodule.     Planning on colonoscopy and EGD with GI for +cologuard and abdominal pain. MSG seems to trigger. Had chinese food. Wants to do with [facility].    Abnormal EKG in ED, node placement was off, bradycardia. Seeing cads 4/4/23 outside [facility].    Answers submitted by the patient for this visit:  Review of Systems Questionnaire (Submitted on 3/4/2023)  activity change: No  unexpected weight change: No  rhinorrhea: No  trouble swallowing: No  visual disturbance: No  chest tightness: No  polyuria: No  difficulty urinating: No  menstrual problem: No  joint swelling: No  arthralgias: No  confusion: No  dysphoric mood: No      Review of Systems   Constitutional:  Negative for chills and fever.   HENT:  Negative for hearing loss.    Eyes:  Negative for discharge.   Respiratory:  Negative for wheezing.    Cardiovascular:  Negative for chest pain and palpitations.   Gastrointestinal:  Negative for blood in stool, constipation, diarrhea and vomiting.   Genitourinary:  Negative for dysuria and hematuria.   Musculoskeletal:  Negative for neck pain.   Neurological:  Negative for weakness and headaches.   Endo/Heme/Allergies:  Negative for polydipsia.      PAST HISTORY:     Past Medical History:   Diagnosis Date    HLD (hyperlipidemia)     Osteoporosis        No past surgical history on file.    No family history on file.      MEDICATIONS & ALLERGIES:     Current Outpatient Medications on File Prior to Visit   Medication Sig    albuterol (VENTOLIN HFA) 90 mcg/actuation inhaler Inhale 2 puffs into the lungs every 6 (six) hours as needed for Wheezing. Rescue (Patient not taking: Reported on 3/3/2023)     atorvastatin (LIPITOR) 40 MG tablet TAKE 1 TABLET BY MOUTH EVERY DAY IN THE EVENING     No current facility-administered medications on file prior to visit.       Review of patient's allergies indicates:   Allergen Reactions    Hay fever and allergy relief        OBJECTIVE:     There is no height or weight on file to calculate BMI.     Physical Exam:  Physical Exam  Constitutional:       General: She is not in acute distress.     Appearance: Normal appearance. She is not ill-appearing, toxic-appearing or diaphoretic.      Comments: Limited 2/2 Virtual Exam   HENT:      Head: Normocephalic and atraumatic.   Eyes:      Conjunctiva/sclera: Conjunctivae normal.   Pulmonary:      Effort: Pulmonary effort is normal. No respiratory distress.   Neurological:      Mental Status: She is alert and oriented to person, place, and time. Mental status is at baseline.   Psychiatric:         Mood and Affect: Mood normal.         Behavior: Behavior normal.          Laboratory  Lab Results   Component Value Date    WBC 10.33 02/16/2023    HGB 13.7 02/16/2023    HCT 40.4 02/16/2023    MCV 90 02/16/2023     02/16/2023     Lab Results   Component Value Date     02/16/2023     02/16/2023    K 3.7 02/16/2023     02/16/2023    CO2 25 02/16/2023    BUN 13 02/16/2023    CREATININE 0.7 02/16/2023    CALCIUM 9.7 02/16/2023     No results found for: INR, PROTIME  Lab Results   Component Value Date    HGBA1C 5.3 12/06/2022             ASSESSMENT & PLAN:   Ms. Karlie Carrion is a 67 y.o. female who was seen today for abnormal EKG.     [facility] positive Cologuard and abdominal pain.  Patient declines EGD as originally advised by [facility].  Reports abdominal pain possibly triggered by msg.  This is happened before in the past.    Given her abnormal EKG, would need preop clearance from her cardiologist outside of [facility].  Offered [facility] but patient  declines.      1. Abnormal EKG    2. Positive colorectal cancer screening using Cologuard test    3. Right lower lobe lung mass    4. Former smoker    5. Centrilobular emphysema                 Kasia Acosta MD  Internal Medicine          Face to Face time with patient: 12  25 minutes of total time spent on the encounter, which includes face to face time and non-face to face time preparing to see the patient (eg, review of tests), Obtaining and/or reviewing separately obtained history, Documenting clinical information in the electronic or other health record, Independently interpreting results (not separately reported) and communicating results to the patient/family/caregiver, or Care coordination (not separately reported).         Each patient to whom he or she provides medical services by telemedicine is:  (1) informed of the relationship between the physician and patient and the respective role of any other health care provider with respect to management of the patient; and (2) notified that he or she may decline to receive medical services by telemedicine and may withdraw from such care at any time.

## 2023-03-08 ENCOUNTER — TELEPHONE (OUTPATIENT)
Dept: ENDOSCOPY | Facility: HOSPITAL | Age: 68
End: 2023-03-08
Payer: MEDICARE

## 2023-03-16 ENCOUNTER — PATIENT MESSAGE (OUTPATIENT)
Dept: PRIMARY CARE CLINIC | Facility: CLINIC | Age: 68
End: 2023-03-16
Payer: MEDICARE

## 2023-03-24 ENCOUNTER — PATIENT MESSAGE (OUTPATIENT)
Dept: PRIMARY CARE CLINIC | Facility: CLINIC | Age: 68
End: 2023-03-24
Payer: MEDICARE

## 2023-03-24 ENCOUNTER — HOSPITAL ENCOUNTER (OUTPATIENT)
Dept: RADIOLOGY | Facility: HOSPITAL | Age: 68
Discharge: HOME | End: 2023-03-24
Attending: INTERNAL MEDICINE
Payer: MEDICARE

## 2023-03-24 ENCOUNTER — PATIENT MESSAGE (OUTPATIENT)
Dept: CARDIOLOGY | Facility: CLINIC | Age: 68
End: 2023-03-24
Payer: MEDICARE

## 2023-03-24 VITALS — HEIGHT: 60 IN | BODY MASS INDEX: 27.09 KG/M2 | WEIGHT: 138 LBS

## 2023-03-24 LAB — GLUCOSE SERPL-MCNC: 94 MG/DL (ref 70–110)

## 2023-03-24 PROCEDURE — A9552 F18 FDG: HCPCS | Mod: PO

## 2023-03-24 PROCEDURE — 78815 PET IMAGE W/CT SKULL-THIGH: CPT | Mod: TC,PS,PO

## 2023-03-24 NOTE — PROGRESS NOTES
HISTORY:    66 yo F w a h/o hyperlipidemia, previous tob, IBS, and anxiety presenting for initial evaluation by me.    The patient denies any symptoms of chest pain, shortness of breath, or dyspnea on exertion.    She has recently increased her activity and is exercising in the pool and weight lifting without issue.    The patient denies any previous history of myocardial infarction, coronary artery disease, peripheral arterial disease, stroke, congestive heart failure, or cardiomyopathy.    Recently started on atorvastatin 40x1. Checks Bps at home and reports 120s/70s at home.       PHYSICAL EXAM:    Vitals:    03/27/23 0806   BP: (!) 158/75   Pulse: (!) 53       NAD, A+Ox3.  No jvd, no bruit.  RRR nml s1,s2. No murmurs.  CTA B no wheezes or crackles.  No edema.    LABS/STUDIES (imaging reviewed during clinic visit):    February 2023 CBC and CMP normal.  2022  and HDL 43.  Triglycerides 129.  TSH normal.  A1c normal.    ECG today NSR no Qs/Sts. February 2023 limb lead misplacement.  Sinus rhythm with no Q-waves or ST changes.  PET CT 2023 Aortic and iliac atherosclerosis.     ASSESSMENT & PLAN:    1. Nonspecific abnormal electrocardiogram (ECG) (EKG)    2. Pre-operative clearance    3. Hyperlipidemia, unspecified hyperlipidemia type    4. Elevated blood pressure reading    5. Hyperlipidemia, unspecified        Orders Placed This Encounter    IN OFFICE EKG 12-LEAD (to Muse)    atorvastatin (LIPITOR) 80 MG tablet      ECG normal. No CV hx or CV symptoms. + smoking history with COPD. Agree with RF modification. Elevated LDL. Tolerating atorvastatin 40x1, increase to 80x1.     Pt reports normal Bps at home and a component of white coat hypertension. Exercise capacity improving at this time and pt doing well at the gym. Focused on diet modifications and goal of 25 pound weight loss.     No CV contraindications to proceeding with colonoscopy.    Follow up in about 1 year (around 3/27/2024).      Katheryn Calderon  MD

## 2023-03-27 ENCOUNTER — TELEPHONE (OUTPATIENT)
Dept: CARDIOLOGY | Facility: CLINIC | Age: 68
End: 2023-03-27

## 2023-03-27 ENCOUNTER — PATIENT MESSAGE (OUTPATIENT)
Dept: PRIMARY CARE CLINIC | Facility: CLINIC | Age: 68
End: 2023-03-27
Payer: MEDICARE

## 2023-03-27 ENCOUNTER — OFFICE VISIT (OUTPATIENT)
Dept: CARDIOLOGY | Facility: CLINIC | Age: 68
End: 2023-03-27
Payer: MEDICARE

## 2023-03-27 VITALS
DIASTOLIC BLOOD PRESSURE: 75 MMHG | WEIGHT: 140.88 LBS | BODY MASS INDEX: 27.66 KG/M2 | HEIGHT: 60 IN | SYSTOLIC BLOOD PRESSURE: 158 MMHG | HEART RATE: 53 BPM

## 2023-03-27 DIAGNOSIS — E78.5 HYPERLIPIDEMIA, UNSPECIFIED: ICD-10-CM

## 2023-03-27 DIAGNOSIS — Z01.818 PRE-OPERATIVE CLEARANCE: ICD-10-CM

## 2023-03-27 DIAGNOSIS — R03.0 ELEVATED BLOOD PRESSURE READING: ICD-10-CM

## 2023-03-27 DIAGNOSIS — R94.31 NONSPECIFIC ABNORMAL ELECTROCARDIOGRAM (ECG) (EKG): Primary | ICD-10-CM

## 2023-03-27 DIAGNOSIS — E78.5 HYPERLIPIDEMIA, UNSPECIFIED HYPERLIPIDEMIA TYPE: ICD-10-CM

## 2023-03-27 PROCEDURE — 93005 ELECTROCARDIOGRAM TRACING: CPT | Mod: PBBFAC | Performed by: INTERNAL MEDICINE

## 2023-03-27 PROCEDURE — 99204 OFFICE O/P NEW MOD 45 MIN: CPT | Mod: 25,S$PBB,, | Performed by: INTERNAL MEDICINE

## 2023-03-27 PROCEDURE — 99999 PR PBB SHADOW E&M-EST. PATIENT-LVL III: CPT | Mod: PBBFAC,,, | Performed by: INTERNAL MEDICINE

## 2023-03-27 PROCEDURE — 99204 PR OFFICE/OUTPT VISIT, NEW, LEVL IV, 45-59 MIN: ICD-10-PCS | Mod: 25,S$PBB,, | Performed by: INTERNAL MEDICINE

## 2023-03-27 PROCEDURE — 99999 PR PBB SHADOW E&M-EST. PATIENT-LVL III: ICD-10-PCS | Mod: PBBFAC,,, | Performed by: INTERNAL MEDICINE

## 2023-03-27 PROCEDURE — 93010 ELECTROCARDIOGRAM REPORT: CPT | Mod: S$PBB,,, | Performed by: INTERNAL MEDICINE

## 2023-03-27 PROCEDURE — 93010 EKG 12-LEAD: ICD-10-PCS | Mod: S$PBB,,, | Performed by: INTERNAL MEDICINE

## 2023-03-27 PROCEDURE — 99213 OFFICE O/P EST LOW 20 MIN: CPT | Mod: PBBFAC | Performed by: INTERNAL MEDICINE

## 2023-03-27 RX ORDER — ATORVASTATIN CALCIUM 80 MG/1
80 TABLET, FILM COATED ORAL NIGHTLY
Qty: 90 TABLET | Refills: 3 | Status: SHIPPED | OUTPATIENT
Start: 2023-03-27 | End: 2023-04-11

## 2023-03-28 DIAGNOSIS — R91.1 NODULE OF LOWER LOBE OF RIGHT LUNG: Primary | ICD-10-CM

## 2023-03-28 PROBLEM — R91.8 RIGHT LOWER LOBE LUNG MASS: Status: RESOLVED | Noted: 2023-01-23 | Resolved: 2023-03-28

## 2023-03-29 ENCOUNTER — PATIENT MESSAGE (OUTPATIENT)
Dept: PRIMARY CARE CLINIC | Facility: CLINIC | Age: 68
End: 2023-03-29
Payer: MEDICARE

## 2023-04-11 ENCOUNTER — OFFICE VISIT (OUTPATIENT)
Dept: PULMONOLOGY | Facility: CLINIC | Age: 68
End: 2023-04-11
Payer: MEDICARE

## 2023-04-11 VITALS
BODY MASS INDEX: 26.51 KG/M2 | HEIGHT: 60 IN | WEIGHT: 135.06 LBS | HEART RATE: 51 BPM | DIASTOLIC BLOOD PRESSURE: 66 MMHG | OXYGEN SATURATION: 96 % | SYSTOLIC BLOOD PRESSURE: 156 MMHG

## 2023-04-11 DIAGNOSIS — Z87.891 FORMER SMOKER: ICD-10-CM

## 2023-04-11 DIAGNOSIS — R91.1 NODULE OF LOWER LOBE OF RIGHT LUNG: ICD-10-CM

## 2023-04-11 DIAGNOSIS — J43.2 CENTRILOBULAR EMPHYSEMA: ICD-10-CM

## 2023-04-11 DIAGNOSIS — Z86.19 HISTORY OF COCCIDIOIDOMYCOSIS: Primary | ICD-10-CM

## 2023-04-11 PROCEDURE — 99213 OFFICE O/P EST LOW 20 MIN: CPT | Mod: PBBFAC,PO | Performed by: INTERNAL MEDICINE

## 2023-04-11 PROCEDURE — 99214 OFFICE O/P EST MOD 30 MIN: CPT | Mod: S$PBB,,, | Performed by: INTERNAL MEDICINE

## 2023-04-11 PROCEDURE — 99999 PR PBB SHADOW E&M-EST. PATIENT-LVL III: ICD-10-PCS | Mod: PBBFAC,,, | Performed by: INTERNAL MEDICINE

## 2023-04-11 PROCEDURE — 99999 PR PBB SHADOW E&M-EST. PATIENT-LVL III: CPT | Mod: PBBFAC,,, | Performed by: INTERNAL MEDICINE

## 2023-04-11 PROCEDURE — 99214 PR OFFICE/OUTPT VISIT, EST, LEVL IV, 30-39 MIN: ICD-10-PCS | Mod: S$PBB,,, | Performed by: INTERNAL MEDICINE

## 2023-04-11 NOTE — PATIENT INSTRUCTIONS
CT chest in 9/2023- if anything concerning will let you know  Continue exercise  Lung function is good

## 2023-04-11 NOTE — PROGRESS NOTES
"4/11/2023    [facility]  Follow up    Chief Complaint   Patient presents with    Pulmonary Nodules       HPI:   04/11/2023- pt feeling fine. Coughs a little when smoking marijuana. She brought films (CXR PA and lat) from 2005- looks normal. States at the time had bronchitis/pna.   Her foot is improved and she is exercising- swims 3x/wk, lifts wts 2x/wk. Stationary bike    2/28/23-   PET scan recommended to further evaluate the lung nodule  Pulmonary function testing  Pt is a 68 yo female with HLD, osteoporosis presenting for new evaluation of imaging abnormality. Pt seen in ED on 2/16 for abd pain and on CT abd/p was found to have 1.5cm RLL nodule.  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 Was told she may have spots on her lungs due to this. Cannot remember what prior chest imaging she had.  Has a hard time maintaining high intensity exercise- gradual problem over time. Has had problem w/ her foot causing her to be inactive- had injury 2 yrs ago, going through PT now, has now noticed a bone poking through and has ortho appt later today to follow up on this.  She had covid 1/2023, fever once then exhaustion, sinus drainage and cough. ESPINOSA has been worse following COVID. Since covid has had loose cough- clear phlegm. Previously never had cough. Denies recurrent respiratory infections or wheezing.  She has albuterol inhaler which she rarely uses.  Smokes marijuana occasionally. Quit smoking cigarettes 8-9 yr ago. Smoked 1 ppd for a "long time." She had CT chest screening exam done in 12/2022 w/ incidental RLL nodule.     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  She has a degree in genetics.    The chief complaint problem is stable    PFSH:  Past Medical History:   Diagnosis Date    HLD (hyperlipidemia)     Osteoporosis          No past surgical history on file.  Social History     Tobacco Use    Smoking status: Former     Packs/day: " 1.50     Years: 25.00     Pack years: 37.50     Types: Cigarettes     Quit date:      Years since quittin.2    Smokeless tobacco: Never   Substance Use Topics    Alcohol use: Not Currently     No family history on file.  Review of patient's allergies indicates:   Allergen Reactions    Hay fever and allergy relief        Performance Status:The patient's activity level is functions out of house.      Review of Systems:  a review of eleven systems covering constitutional, Eye, HEENT, Psych, Respiratory, Cardiac, GI, , Musculoskeletal, Endocrine, Dermatologic was negative except for pertinent findings as listed ABOVE and below:  R foot pain     Exam:Comprehensive exam done. BP (!) 156/66 (BP Location: Left arm, Patient Position: Sitting, BP Method: Medium (Automatic))   Pulse (!) 51   Ht 5' (1.524 m)   Wt 61.3 kg (135 lb 0.5 oz)   SpO2 96%   BMI 26.37 kg/m²   Exam included Vitals as listed, and patient's appearance and affect and alertness and mood, oral exam for yeast and hygiene and pharynx lesions and Mallapatti (M) score, neck with inspection for jvd and masses and thyroid abnormalities and lymph nodes (supraclavicular and infraclavicular nodes and axillary also examined and noted if abn), chest exam included symmetry and effort and fremitus and percussion and auscultation, cardiac exam included rhythm and gallops and murmur and rubs and jvd and edema, abdominal exam for mass and hepatosplenomegaly and tenderness and hernias and bowel sounds, Musculoskeletal exam with muscle tone and posture and mobility/gait and  strength, and skin for rashes and cyanosis and pallor and turgor, extremity for clubbing.  Findings were normal except for pertinent findings listed below:  Oropharynx clear, M2  HR regular  Breath sounds clear bilat  No edema/clubbing    Radiographs (ct chest and cxr) reviewed: view by direct vision   PET CT 3/24/23-   IMPRESSION:  1. No abnormal focal FDG hypermetabolism to suggest  primary neoplasm or metastatic disease. Specifically the 17 mm right lower lobe pulmonary opacity is not hypermetabolic.  2. Additional observations as described.    CT abd/p 2/16/23-   Impression:  No acute abnormalities in the abdomen or pelvis.  1.5 cm right lower lobe pulmonary nodule abutting the diaphragm.  For a solid nodule >8 mm, Fleischner Society 2017 guidelines recommend considering CT, PET/CT or tissue sampling at 3 months.  Recommend follow-up with pulmonary medicine for further evaluation and treatment recommendations.    CT chest w/o contrast 12/13/22 DIS outside images reviewed and report reviewed-   RLL 1.6cm nodule, R hilar adenopathy? 1.4cm prevascular node on left, emphysematous changes    Labs reviewed        Latest Reference Range & Units 02/16/23 07:40   WBC 3.90 - 12.70 K/uL 10.33   RBC 4.00 - 5.40 M/uL 4.47   Hemoglobin 12.0 - 16.0 g/dL 13.7   Hematocrit 37.0 - 48.5 % 40.4   MCV 82 - 98 fL 90   MCH 27.0 - 31.0 pg 30.6   MCHC 32.0 - 36.0 g/dL 33.9   RDW 11.5 - 14.5 % 11.6   Platelets 150 - 450 K/uL 252      Latest Reference Range & Units 02/16/23 07:40   Sodium 136 - 145 mmol/L 143   Potassium 3.5 - 5.1 mmol/L 3.7   Chloride 95 - 110 mmol/L 108   CO2 23 - 29 mmol/L 25   Anion Gap 8 - 16 mmol/L 10   BUN 8 - 23 mg/dL 13   Creatinine 0.5 - 1.4 mg/dL 0.7   eGFR >60 mL/min/1.73 m^2 >60.0   Glucose 70 - 110 mg/dL 106   Calcium 8.7 - 10.5 mg/dL 9.7   Alkaline Phosphatase 55 - 135 U/L 84   PROTEIN TOTAL 6.0 - 8.4 g/dL 7.0   Albumin 3.5 - 5.2 g/dL 3.5   BILIRUBIN TOTAL 0.1 - 1.0 mg/dL 0.5   AST 10 - 40 U/L 17   ALT 10 - 44 U/L 18   Lipase 4 - 60 U/L 17       PFT  reviewed  3/5/23- normal spirometry, lung vol, moderately reduced dlco      Plan:  Clinical impression is reasonably certain and repeated evaluation prn +/- follow up will be needed as below. RLL nodule, non PET avid, suspect benign and may be residual from past valley fever. Mild emphysema, past smoker    Karlie was seen today for pulmonary  nodules.    Diagnoses and all orders for this visit:    History of coccidioidomycosis    Former smoker    Nodule of lower lobe of right lung    Centrilobular emphysema          Follow up in about 1 year (around 4/11/2024).    Discussed with patient above for education the following:      Patient Instructions   CT chest in 9/2023- if anything concerning will let you know  Continue exercise  Lung function is good

## 2023-04-25 LAB — CRC RECOMMENDATION EXT: NORMAL

## 2023-05-02 ENCOUNTER — PATIENT MESSAGE (OUTPATIENT)
Dept: PRIMARY CARE CLINIC | Facility: CLINIC | Age: 68
End: 2023-05-02
Payer: MEDICARE

## 2023-05-28 ENCOUNTER — PATIENT MESSAGE (OUTPATIENT)
Dept: PRIMARY CARE CLINIC | Facility: CLINIC | Age: 68
End: 2023-05-28
Payer: MEDICARE

## 2023-06-01 DIAGNOSIS — M25.561 CHRONIC PAIN OF BOTH KNEES: Primary | ICD-10-CM

## 2023-06-01 DIAGNOSIS — M25.562 CHRONIC PAIN OF BOTH KNEES: Primary | ICD-10-CM

## 2023-06-01 DIAGNOSIS — G89.29 CHRONIC PAIN OF BOTH KNEES: Primary | ICD-10-CM

## 2023-06-02 ENCOUNTER — PATIENT OUTREACH (OUTPATIENT)
Dept: ADMINISTRATIVE | Facility: HOSPITAL | Age: 68
End: 2023-06-02
Payer: MEDICARE

## 2023-06-02 NOTE — PROGRESS NOTES
Patient due for the following    Pneumococcal Vaccines (Age 65+) (1 - PCV)    TETANUS VACCINE     Shingles Vaccine (1 of 2)    COVID-19 Vaccine (6 - Moderna series)    Colorectal Cancer Screening       Immunizations: reviewed and updated  Care Everywhere: triggered  Care Teams: up to date  Outreach: none needed

## 2023-06-26 ENCOUNTER — HOSPITAL ENCOUNTER (OUTPATIENT)
Dept: RADIOLOGY | Facility: HOSPITAL | Age: 68
Discharge: HOME | End: 2023-06-26
Attending: ORTHOPAEDIC SURGERY
Payer: MEDICARE

## 2023-06-26 ENCOUNTER — OFFICE VISIT (OUTPATIENT)
Dept: SPORTS MEDICINE | Facility: CLINIC | Age: 68
End: 2023-06-26
Payer: MEDICARE

## 2023-06-26 VITALS
HEART RATE: 80 BPM | BODY MASS INDEX: 26.37 KG/M2 | SYSTOLIC BLOOD PRESSURE: 132 MMHG | HEIGHT: 60 IN | DIASTOLIC BLOOD PRESSURE: 74 MMHG

## 2023-06-26 DIAGNOSIS — G89.29 CHRONIC PAIN OF RIGHT KNEE: ICD-10-CM

## 2023-06-26 DIAGNOSIS — M17.11 PRIMARY OSTEOARTHRITIS OF RIGHT KNEE: Primary | ICD-10-CM

## 2023-06-26 DIAGNOSIS — M25.561 CHRONIC PAIN OF RIGHT KNEE: ICD-10-CM

## 2023-06-26 PROCEDURE — 73564 X-RAY EXAM KNEE 4 OR MORE: CPT | Mod: TC,RT

## 2023-06-26 PROCEDURE — 73564 XR KNEE ORTHO RIGHT WITH FLEXION: ICD-10-PCS | Mod: 26,RT,, | Performed by: RADIOLOGY

## 2023-06-26 PROCEDURE — 99204 OFFICE O/P NEW MOD 45 MIN: CPT | Mod: S$PBB,,, | Performed by: ORTHOPAEDIC SURGERY

## 2023-06-26 PROCEDURE — 73562 XR KNEE ORTHO RIGHT WITH FLEXION: ICD-10-PCS | Mod: 26,LT,, | Performed by: RADIOLOGY

## 2023-06-26 PROCEDURE — 99999 PR PBB SHADOW E&M-EST. PATIENT-LVL III: CPT | Mod: PBBFAC,,, | Performed by: ORTHOPAEDIC SURGERY

## 2023-06-26 PROCEDURE — 99999 PR PBB SHADOW E&M-EST. PATIENT-LVL III: ICD-10-PCS | Mod: PBBFAC,,, | Performed by: ORTHOPAEDIC SURGERY

## 2023-06-26 PROCEDURE — 99213 OFFICE O/P EST LOW 20 MIN: CPT | Mod: PBBFAC | Performed by: ORTHOPAEDIC SURGERY

## 2023-06-26 PROCEDURE — 73564 X-RAY EXAM KNEE 4 OR MORE: CPT | Mod: 26,RT,, | Performed by: RADIOLOGY

## 2023-06-26 PROCEDURE — 99204 PR OFFICE/OUTPT VISIT, NEW, LEVL IV, 45-59 MIN: ICD-10-PCS | Mod: S$PBB,,, | Performed by: ORTHOPAEDIC SURGERY

## 2023-06-26 PROCEDURE — 73562 X-RAY EXAM OF KNEE 3: CPT | Mod: 26,LT,, | Performed by: RADIOLOGY

## 2023-06-26 NOTE — PROGRESS NOTES
CC: Right knee pain    67 y.o. Female who presents as a new patient to me.  Accompanied by her .  Chief complaint of activity-related pain, stiffness and difficulty with gaining terminal range of motion both in extension and flexion.  Treatment to this point has included corticosteroid injection in May which provided limited relief, therapy, oral medication.  No prior viscosupplementation.  She does have history of previous arthroscopic debridement procedures in the .  She remains quite active for her age.  She denies any ipsilateral groin or hip pain.  No back or radicular symptoms.  She would like to discuss treatment options at this time and would like to avoid surgery if possible.    REVIEW OF SYSTEMS:   Constitution: Negative. Negative for chills, fever and night sweats.    Hematologic/Lymphatic: Negative for bleeding problem. Does not bruise/bleed easily.   Skin: Negative for dry skin, itching and rash.   Musculoskeletal: Negative for falls. Positive for right knee pain and muscle weakness.     All other review of symptoms were reviewed and found to be noncontributory.     PAST MEDICAL HISTORY:   Past Medical History:   Diagnosis Date    HLD (hyperlipidemia)     Osteoporosis      PAST SURGICAL HISTORY:   No past surgical history on file.    FAMILY HISTORY:   No family history on file.    SOCIAL HISTORY:   Social History     Socioeconomic History    Marital status:    Tobacco Use    Smoking status: Former     Packs/day: 1.50     Years: 25.00     Pack years: 37.50     Types: Cigarettes     Quit date:      Years since quittin.4    Smokeless tobacco: Never   Substance and Sexual Activity    Alcohol use: Not Currently     MEDICATIONS:     Current Outpatient Medications:     albuterol (VENTOLIN HFA) 90 mcg/actuation inhaler, Inhale 2 puffs into the lungs every 6 (six) hours as needed for Wheezing. Rescue (Patient not taking: Reported on 2023), Disp: 18 g, Rfl: 0    ALLERGIES:   Review  of patient's allergies indicates:   Allergen Reactions    Hay fever and allergy relief       PHYSICAL EXAMINATION:  /74   Pulse 80   Ht 5' (1.524 m)   BMI 26.37 kg/m²   General: Well-developed well-nourished 67 y.o. femalein no acute distress   Cardiovascular: Regular rhythm by palpation of distal pulse, normal color and temperature, no concerning varicosities on symptomatic side   Lungs: No labored breathing or wheezing appreciated   Neuro: Alert and oriented ×3   Psychiatric: well oriented to person, place and time, demonstrates normal mood and affect   Skin: No rashes, lesions or ulcers, normal temperature, turgor, and texture on involved extremity    Ortho/SPM Exam  Examination of the right knee shows well-healed arthroscopic incisions.  No effusion.  No significant tenderness to palpation.  Range of motion lacks 5° of terminal extension passively, active assisted flexion to 120° with crepitus.  Positive patellar crepitus and grind.  Ligamentously stable.    IMAGING:  X-rays including standing, weight bearing AP and flexion bilateral knees, BILATERALee lateral and sunrise views ordered and images reviewed by me show:    Advanced tricompartmental DJD, bone-on-bone articulation medially.  KL4    ASSESSMENT:      ICD-10-CM ICD-9-CM   1. Primary osteoarthritis of right knee  M17.11 715.16   2. Chronic pain of right knee  M25.561 719.46    G89.29 338.29       PLAN:     Findings discussed with the patient and her .  Most consistent with symptomatic DJD.  We discussed both operative and non operative treatment options to include total knee arthroplasty.  The patient would like to avoid surgery at this time if possible.  I do think she is a candidate for Euflexxa/viscosupplement injections.  We will go ahead and set her up with this.  No guarantees regarding response.  Otherwise continued low-impact cardio exercise and activity modulation as needed.  Follow-up with mid-level provider for injection.  She  will let us know if and when she wants to proceed with total knee arthroplasty.    Procedures

## 2023-07-02 ENCOUNTER — PATIENT MESSAGE (OUTPATIENT)
Dept: PRIMARY CARE CLINIC | Facility: CLINIC | Age: 68
End: 2023-07-02
Payer: MEDICARE

## 2023-07-11 ENCOUNTER — OFFICE VISIT (OUTPATIENT)
Dept: SPORTS MEDICINE | Facility: CLINIC | Age: 68
End: 2023-07-11
Payer: MEDICARE

## 2023-07-11 VITALS
DIASTOLIC BLOOD PRESSURE: 74 MMHG | WEIGHT: 135 LBS | HEIGHT: 60 IN | BODY MASS INDEX: 26.5 KG/M2 | HEART RATE: 63 BPM | SYSTOLIC BLOOD PRESSURE: 155 MMHG

## 2023-07-11 DIAGNOSIS — M17.11 PRIMARY OSTEOARTHRITIS OF RIGHT KNEE: Primary | ICD-10-CM

## 2023-07-11 PROCEDURE — 99499 UNLISTED E&M SERVICE: CPT | Mod: S$PBB,,, | Performed by: PHYSICIAN ASSISTANT

## 2023-07-11 PROCEDURE — 20610 DRAIN/INJ JOINT/BURSA W/O US: CPT | Mod: PBBFAC,RT | Performed by: PHYSICIAN ASSISTANT

## 2023-07-11 PROCEDURE — 99499 NO LOS: ICD-10-PCS | Mod: S$PBB,,, | Performed by: PHYSICIAN ASSISTANT

## 2023-07-11 PROCEDURE — 99213 OFFICE O/P EST LOW 20 MIN: CPT | Mod: PBBFAC,25 | Performed by: PHYSICIAN ASSISTANT

## 2023-07-11 PROCEDURE — 20610 PR DRAIN/INJECT LARGE JOINT/BURSA: ICD-10-PCS | Mod: S$PBB,RT,, | Performed by: PHYSICIAN ASSISTANT

## 2023-07-11 PROCEDURE — 20610 DRAIN/INJ JOINT/BURSA W/O US: CPT | Mod: S$PBB,RT,, | Performed by: PHYSICIAN ASSISTANT

## 2023-07-11 PROCEDURE — 99999 PR PBB SHADOW E&M-EST. PATIENT-LVL III: ICD-10-PCS | Mod: PBBFAC,,, | Performed by: PHYSICIAN ASSISTANT

## 2023-07-11 PROCEDURE — 99999 PR PBB SHADOW E&M-EST. PATIENT-LVL III: CPT | Mod: PBBFAC,,, | Performed by: PHYSICIAN ASSISTANT

## 2023-07-11 RX ADMIN — Medication 20 MG: at 01:07

## 2023-07-11 NOTE — PROGRESS NOTES
Patient is here for follow up of right knee arthritis. Pt is requesting Euflexxa injection #1.  [facility] Has failed other conservative modalities including NSAIDS, activity modification, weight loss.    The prior shot was tolerated well.    PHYSICAL EXAMINATION:     General: The patient is alert and oriented x 3. Mood is pleasant.   Observation of ears, eyes and nose reveals no gross abnormalities. No   labored breathing observed.     No signs of infection or adverse reaction to knee.    PROCEDURE NOTE:  Injection Procedure  A time out was performed, including verification of patient ID, procedure, site and side, availability of information and equipment, review of safety issues, and agreement with consent, the procedure site was marked.    After time out was performed, the patient was prepped aseptically with povidone-iodine swabsticks. A diagnostic and therapeutic injection of 2cc Euflexxa was given under sterile technique using a 22g x 1.5 needle from the Anterior  aspect of the right Knee Joint in the sitting position.      Karlie Carrion had no adverse reactions to the medication. Pain decreased. She was instructed to apply ice to the joint for 20 minutes and avoid strenuous activities for 24-36 hours following the injection. She was warned of possible blood sugar and/or blood pressure changes during that time. Following that time, she can resume regular activities.    She was reminded to call the clinic immediately for any adverse side effects as explained in clinic today.      RTC 1 week for 2nd injection.  All questions were answered, pt will contact us for questions or concerns in the interim.

## 2023-07-18 ENCOUNTER — OFFICE VISIT (OUTPATIENT)
Dept: SPORTS MEDICINE | Facility: CLINIC | Age: 68
End: 2023-07-18
Payer: MEDICARE

## 2023-07-18 VITALS
WEIGHT: 134.5 LBS | DIASTOLIC BLOOD PRESSURE: 73 MMHG | BODY MASS INDEX: 26.41 KG/M2 | HEART RATE: 59 BPM | SYSTOLIC BLOOD PRESSURE: 136 MMHG | HEIGHT: 60 IN

## 2023-07-18 DIAGNOSIS — M17.11 PRIMARY OSTEOARTHRITIS OF RIGHT KNEE: Primary | ICD-10-CM

## 2023-07-18 PROCEDURE — 20610 PR DRAIN/INJECT LARGE JOINT/BURSA: ICD-10-PCS | Mod: S$PBB,RT,, | Performed by: PHYSICIAN ASSISTANT

## 2023-07-18 PROCEDURE — 20610 DRAIN/INJ JOINT/BURSA W/O US: CPT | Mod: S$PBB,RT,, | Performed by: PHYSICIAN ASSISTANT

## 2023-07-18 PROCEDURE — 20610 DRAIN/INJ JOINT/BURSA W/O US: CPT | Mod: PBBFAC | Performed by: PHYSICIAN ASSISTANT

## 2023-07-18 PROCEDURE — 99499 NO LOS: ICD-10-PCS | Mod: S$PBB,,, | Performed by: PHYSICIAN ASSISTANT

## 2023-07-18 PROCEDURE — 99499 UNLISTED E&M SERVICE: CPT | Mod: S$PBB,,, | Performed by: PHYSICIAN ASSISTANT

## 2023-07-18 PROCEDURE — 99999 PR PBB SHADOW E&M-EST. PATIENT-LVL III: ICD-10-PCS | Mod: PBBFAC,,, | Performed by: PHYSICIAN ASSISTANT

## 2023-07-18 PROCEDURE — 99213 OFFICE O/P EST LOW 20 MIN: CPT | Mod: PBBFAC,25 | Performed by: PHYSICIAN ASSISTANT

## 2023-07-18 PROCEDURE — 99999 PR PBB SHADOW E&M-EST. PATIENT-LVL III: CPT | Mod: PBBFAC,,, | Performed by: PHYSICIAN ASSISTANT

## 2023-07-18 RX ADMIN — Medication 20 MG: at 01:07

## 2023-07-18 NOTE — PROGRESS NOTES
Patient is here for follow up of right knee arthritis. Pt is requesting Euflexxa injection #2.  [facility] reviewed per encounter record. Has failed other conservative modalities including NSAIDS, activity modification, weight loss.    The prior shot was tolerated well.    PHYSICAL EXAMINATION:     General: The patient is alert and oriented x 3. Mood is pleasant.   Observation of ears, eyes and nose reveals no gross abnormalities. No   labored breathing observed.     No signs of infection or adverse reaction to knee.    PROCEDURE NOTE:  Injection Procedure  A time out was performed, including verification of patient ID, procedure, site and side, availability of information and equipment, review of safety issues, and agreement with consent, the procedure site was marked.    After time out was performed, the patient was prepped aseptically with povidone-iodine swabsticks. A diagnostic and therapeutic injection of 2cc Euflexxa was given under sterile technique using a 22g x 1.5 needle from the Anterior  aspect of the right Knee Joint in the sitting position.      Karlie Carrion had no adverse reactions to the medication. Pain decreased. She was instructed to apply ice to the joint for 20 minutes and avoid strenuous activities for 24-36 hours following the injection. She was warned of possible blood sugar and/or blood pressure changes during that time. Following that time, she can resume regular activities.    She was reminded to call the clinic immediately for any adverse side effects as explained in clinic today.      RTC 1 week for 3rd injection.  All questions were answered, pt will contact us for questions or concerns in the interim.

## 2023-07-25 ENCOUNTER — OFFICE VISIT (OUTPATIENT)
Dept: SPORTS MEDICINE | Facility: CLINIC | Age: 68
End: 2023-07-25
Payer: MEDICARE

## 2023-07-25 VITALS
HEART RATE: 73 BPM | HEIGHT: 60 IN | SYSTOLIC BLOOD PRESSURE: 135 MMHG | WEIGHT: 134.5 LBS | BODY MASS INDEX: 26.41 KG/M2 | DIASTOLIC BLOOD PRESSURE: 65 MMHG

## 2023-07-25 DIAGNOSIS — M17.11 PRIMARY OSTEOARTHRITIS OF RIGHT KNEE: Primary | ICD-10-CM

## 2023-07-25 PROCEDURE — 99499 UNLISTED E&M SERVICE: CPT | Mod: S$PBB,,, | Performed by: PHYSICIAN ASSISTANT

## 2023-07-25 PROCEDURE — 99213 OFFICE O/P EST LOW 20 MIN: CPT | Mod: PBBFAC | Performed by: PHYSICIAN ASSISTANT

## 2023-07-25 PROCEDURE — 20610 DRAIN/INJ JOINT/BURSA W/O US: CPT | Mod: PBBFAC,RT | Performed by: PHYSICIAN ASSISTANT

## 2023-07-25 PROCEDURE — 20610 PR DRAIN/INJECT LARGE JOINT/BURSA: ICD-10-PCS | Mod: S$PBB,RT,, | Performed by: PHYSICIAN ASSISTANT

## 2023-07-25 PROCEDURE — 99999 PR PBB SHADOW E&M-EST. PATIENT-LVL III: ICD-10-PCS | Mod: PBBFAC,,, | Performed by: PHYSICIAN ASSISTANT

## 2023-07-25 PROCEDURE — 20610 DRAIN/INJ JOINT/BURSA W/O US: CPT | Mod: S$PBB,RT,, | Performed by: PHYSICIAN ASSISTANT

## 2023-07-25 PROCEDURE — 99499 NO LOS: ICD-10-PCS | Mod: S$PBB,,, | Performed by: PHYSICIAN ASSISTANT

## 2023-07-25 PROCEDURE — 99999PBSHW PR PBB SHADOW TECHNICAL ONLY FILED TO HB: Mod: JZ,PBBFAC,,

## 2023-07-25 PROCEDURE — 99999PBSHW PR PBB SHADOW TECHNICAL ONLY FILED TO HB: ICD-10-PCS | Mod: JZ,PBBFAC,,

## 2023-07-25 PROCEDURE — 99999 PR PBB SHADOW E&M-EST. PATIENT-LVL III: CPT | Mod: PBBFAC,,, | Performed by: PHYSICIAN ASSISTANT

## 2023-07-25 PROCEDURE — 20610 DRAIN/INJ JOINT/BURSA W/O US: CPT | Mod: PBBFAC | Performed by: PHYSICIAN ASSISTANT

## 2023-07-25 RX ADMIN — Medication 20 MG: at 02:07

## 2023-07-25 NOTE — PROGRESS NOTES
Patient is here for follow up of right knee arthritis. Pt is requesting Euflexxa injection #3.  [facility] reviewed per encounter record. Has failed other conservative modalities including NSAIDS, activity modification, weight loss.    The prior shot was tolerated well.    PHYSICAL EXAMINATION:     General: The patient is alert and oriented x 3. Mood is pleasant.   Observation of ears, eyes and nose reveals no gross abnormalities. No   labored breathing observed.     No signs of infection or adverse reaction to knee.    PROCEDURE NOTE:  Injection Procedure  A time out was performed, including verification of patient ID, procedure, site and side, availability of information and equipment, review of safety issues, and agreement with consent, the procedure site was marked.    After time out was performed, the patient was prepped aseptically with povidone-iodine swabsticks. A diagnostic and therapeutic injection of 2cc Euflexxa was given under sterile technique using a 22g x 1.5 needle from the Anterior  aspect of the right Knee Joint in the sitting position.      Karlie Carrion had no adverse reactions to the medication. Pain decreased. She was instructed to apply ice to the joint for 20 minutes and avoid strenuous activities for 24-36 hours following the injection. She was warned of possible blood sugar and/or blood pressure changes during that time. Following that time, she can resume regular activities.    She was reminded to call the clinic immediately for any adverse side effects as explained in clinic today.      RTC prn- can repeat visco in 6 months.  All questions were answered, pt will contact us for questions or concerns in the interim.

## 2023-08-01 ENCOUNTER — TELEPHONE (OUTPATIENT)
Dept: ENDOSCOPY | Facility: HOSPITAL | Age: 68
End: 2023-08-01
Payer: MEDICARE

## 2023-08-01 NOTE — TELEPHONE ENCOUNTER
Called to schedule EGD and Colonoscopy. Pt said to remove the order because she has another doctor.      Procedure: Ambulatory referral/consult to Endo Procedure  Status: Needs Scheduling (Sent to Patient)     Requested appt date: 3/4/2023 Authorizing: Ayana Mcneill MD in [facility]     Referral: [id number] (Authorized)         Expires: 9/3/2023 Priority: Routine     Assign to: BRENDA RIGGS       Diagnosis: Abdominal pain, unspecified abdominal location [R10.9]     Positive colorectal cancer screening using Cologuard test [R19.5]      Notes     main campus     Order Specific Questions     What procedure is to be performed?     EGD     Screening Colonoscopy        CPT Code:     ID EGD, FLEX, DIAGNOSTIC [50059]     COLON CA SCRN NOT HI RSK IND []

## 2023-08-09 ENCOUNTER — PATIENT MESSAGE (OUTPATIENT)
Dept: SPORTS MEDICINE | Facility: CLINIC | Age: 68
End: 2023-08-09
Payer: MEDICARE

## 2023-08-15 ENCOUNTER — PATIENT MESSAGE (OUTPATIENT)
Dept: ADMINISTRATIVE | Facility: HOSPITAL | Age: 68
End: 2023-08-15
Payer: MEDICARE

## 2023-08-29 ENCOUNTER — PATIENT OUTREACH (OUTPATIENT)
Dept: ADMINISTRATIVE | Facility: HOSPITAL | Age: 68
End: 2023-08-29
Payer: MEDICARE

## 2023-08-29 NOTE — LETTER
AUTHORIZATION FOR RELEASE OF   CONFIDENTIAL INFORMATION    TO: Didier CARREON,    We are seeing Karlie Carrion, date of birth 1955, in the clinic at [facility]. Kasia Acosta MD is the patient's PCP. Karlie Carrion has an outstanding lab/procedure at the time we reviewed her chart. In order to help keep her health information updated, she has authorized us to request the following medical record(s):        (  )  MAMMOGRAM                                      ( X )  COLONOSCOPY      (  )  PAP SMEAR                                          (  )  OUTSIDE LAB RESULTS     (  )  DEXA SCAN                                          (  )  EYE EXAM            (  )  FOOT EXAM                                          (  )  ENTIRE RECORD     (  )  OUTSIDE IMMUNIZATIONS                 (  )  _______________         Please fax records to [facility], Amjed, Saira, MD, [phone]     ATTN: YANNI          Patient Name: Karlie Carrion  : 1955  Patient Phone #: [phone]

## 2023-08-29 NOTE — PROGRESS NOTES
Patient due for the following    Pneumococcal Vaccines (Age 65+) (1 - PCV)    TETANUS VACCINE     Shingles Vaccine (1 of 2)    COVID-19 Vaccine (6 - Moderna series)    Colorectal Cancer Screening       [facility].    Immunizations: reviewed and updated  Care Everywhere: triggered  Care Teams: up to date  Outreach: none needed

## 2023-08-30 ENCOUNTER — PATIENT MESSAGE (OUTPATIENT)
Dept: SPORTS MEDICINE | Facility: CLINIC | Age: 68
End: 2023-08-30
Payer: MEDICARE

## 2023-08-31 ENCOUNTER — TELEPHONE (OUTPATIENT)
Dept: SPORTS MEDICINE | Facility: CLINIC | Age: 68
End: 2023-08-31
Payer: MEDICARE

## 2023-08-31 DIAGNOSIS — M17.11 PRIMARY OSTEOARTHRITIS OF RIGHT KNEE: Primary | ICD-10-CM

## 2023-08-31 NOTE — TELEPHONE ENCOUNTER
Spoke to patient about starting PT at [facility] for her knee. Patient also requested referral to foot and ankle specialist. Gave her Dr. Ian Strickland's information and reached out to his staff. Patient requested that a provider look over her DEXA scan of her hip and let her know what her treatment options are.     Nichole Miranda [facility], OT  Sports Medicine Assistant - Dr. Marc Hooker   [facility] [place]

## 2023-09-05 ENCOUNTER — PATIENT OUTREACH (OUTPATIENT)
Dept: ADMINISTRATIVE | Facility: HOSPITAL | Age: 68
End: 2023-09-05
Payer: MEDICARE

## 2023-09-05 NOTE — PROGRESS NOTES
Patient due for the following    Pneumococcal Vaccines (Age 65+) (1 - PCV)    TETANUS VACCINE     Shingles Vaccine (1 of 2)    COVID-19 Vaccine (6 - Moderna series)    Influenza Vaccine (1)      Received c-scope records.  Scanned to media.  updated    Immunizations: reviewed and updated  Care Everywhere: triggered  Care Teams: up to date  Outreach: none needed

## 2023-09-11 ENCOUNTER — PATIENT MESSAGE (OUTPATIENT)
Dept: SPORTS MEDICINE | Facility: CLINIC | Age: 68
End: 2023-09-11
Payer: MEDICARE

## 2023-09-12 ENCOUNTER — PATIENT MESSAGE (OUTPATIENT)
Dept: SPORTS MEDICINE | Facility: CLINIC | Age: 68
End: 2023-09-12
Payer: MEDICARE

## 2023-09-27 ENCOUNTER — PATIENT MESSAGE (OUTPATIENT)
Dept: SPORTS MEDICINE | Facility: CLINIC | Age: 68
End: 2023-09-27
Payer: MEDICARE

## 2023-09-27 ENCOUNTER — PATIENT MESSAGE (OUTPATIENT)
Dept: PRIMARY CARE CLINIC | Facility: CLINIC | Age: 68
End: 2023-09-27
Payer: MEDICARE

## 2023-09-27 DIAGNOSIS — M25.552 BILATERAL HIP PAIN: ICD-10-CM

## 2023-09-27 DIAGNOSIS — M25.551 BILATERAL HIP PAIN: ICD-10-CM

## 2023-09-27 DIAGNOSIS — M84.371S STRESS FRACTURE OF RIGHT ANKLE, SEQUELA: Primary | ICD-10-CM

## 2023-09-27 DIAGNOSIS — Z12.31 ENCOUNTER FOR SCREENING MAMMOGRAM FOR MALIGNANT NEOPLASM OF BREAST: ICD-10-CM

## 2023-09-27 NOTE — TELEPHONE ENCOUNTER
Referrals signed.  Thank you for pending them.  I put an order in for her mammogram order since it is going to be due soon, any time after December 13th.  Looks like she is due for her pneumonia vaccine, shingles vaccine.  Flu shot if she would like.   Also looks like her low-dose lung CT scan has been ordered.

## 2023-09-28 ENCOUNTER — OFFICE VISIT (OUTPATIENT)
Dept: PRIMARY CARE CLINIC | Facility: CLINIC | Age: 68
End: 2023-09-28
Payer: MEDICARE

## 2023-09-28 ENCOUNTER — LAB VISIT (OUTPATIENT)
Dept: LAB | Facility: HOSPITAL | Age: 68
End: 2023-09-28
Attending: STUDENT IN AN ORGANIZED HEALTH CARE EDUCATION/TRAINING PROGRAM
Payer: MEDICARE

## 2023-09-28 VITALS
OXYGEN SATURATION: 97 % | HEIGHT: 60 IN | WEIGHT: 133.81 LBS | SYSTOLIC BLOOD PRESSURE: 120 MMHG | BODY MASS INDEX: 26.27 KG/M2 | DIASTOLIC BLOOD PRESSURE: 74 MMHG | HEART RATE: 55 BPM

## 2023-09-28 DIAGNOSIS — M81.0 OSTEOPOROSIS, UNSPECIFIED OSTEOPOROSIS TYPE, UNSPECIFIED PATHOLOGICAL FRACTURE PRESENCE: ICD-10-CM

## 2023-09-28 DIAGNOSIS — M84.371S STRESS FRACTURE OF RIGHT ANKLE, SEQUELA: ICD-10-CM

## 2023-09-28 DIAGNOSIS — Z87.891 FORMER SMOKER: ICD-10-CM

## 2023-09-28 DIAGNOSIS — E78.5 HYPERLIPIDEMIA, UNSPECIFIED HYPERLIPIDEMIA TYPE: ICD-10-CM

## 2023-09-28 DIAGNOSIS — R59.1 LAD (LYMPHADENOPATHY): ICD-10-CM

## 2023-09-28 DIAGNOSIS — K13.79 OTHER LESIONS OF ORAL MUCOSA: Primary | ICD-10-CM

## 2023-09-28 LAB
25(OH)D3+25(OH)D2 SERPL-MCNC: 40 NG/ML (ref 30–96)
ANION GAP SERPL CALC-SCNC: 7 MMOL/L (ref 8–16)
BUN SERPL-MCNC: 16 MG/DL (ref 8–23)
CALCIUM SERPL-MCNC: 9.5 MG/DL (ref 8.7–10.5)
CHLORIDE SERPL-SCNC: 106 MMOL/L (ref 95–110)
CHOLEST SERPL-MCNC: 255 MG/DL (ref 120–199)
CHOLEST/HDLC SERPL: 5.7 {RATIO} (ref 2–5)
CO2 SERPL-SCNC: 27 MMOL/L (ref 23–29)
CREAT SERPL-MCNC: 0.8 MG/DL (ref 0.5–1.4)
EST. GFR  (NO RACE VARIABLE): >60 ML/MIN/1.73 M^2
GLUCOSE SERPL-MCNC: 85 MG/DL (ref 70–110)
HDLC SERPL-MCNC: 45 MG/DL (ref 40–75)
HDLC SERPL: 17.6 % (ref 20–50)
LDLC SERPL CALC-MCNC: 175.6 MG/DL (ref 63–159)
NONHDLC SERPL-MCNC: 210 MG/DL
POTASSIUM SERPL-SCNC: 4 MMOL/L (ref 3.5–5.1)
SODIUM SERPL-SCNC: 140 MMOL/L (ref 136–145)
TRIGL SERPL-MCNC: 172 MG/DL (ref 30–150)

## 2023-09-28 PROCEDURE — 80061 LIPID PANEL: CPT | Performed by: STUDENT IN AN ORGANIZED HEALTH CARE EDUCATION/TRAINING PROGRAM

## 2023-09-28 PROCEDURE — 36415 COLL VENOUS BLD VENIPUNCTURE: CPT | Mod: PN | Performed by: STUDENT IN AN ORGANIZED HEALTH CARE EDUCATION/TRAINING PROGRAM

## 2023-09-28 PROCEDURE — 99999 PR PBB SHADOW E&M-EST. PATIENT-LVL V: CPT | Mod: PBBFAC,,, | Performed by: STUDENT IN AN ORGANIZED HEALTH CARE EDUCATION/TRAINING PROGRAM

## 2023-09-28 PROCEDURE — 99214 OFFICE O/P EST MOD 30 MIN: CPT | Mod: S$PBB,,, | Performed by: STUDENT IN AN ORGANIZED HEALTH CARE EDUCATION/TRAINING PROGRAM

## 2023-09-28 PROCEDURE — 99999 PR PBB SHADOW E&M-EST. PATIENT-LVL V: ICD-10-PCS | Mod: PBBFAC,,, | Performed by: STUDENT IN AN ORGANIZED HEALTH CARE EDUCATION/TRAINING PROGRAM

## 2023-09-28 PROCEDURE — 82306 VITAMIN D 25 HYDROXY: CPT | Performed by: STUDENT IN AN ORGANIZED HEALTH CARE EDUCATION/TRAINING PROGRAM

## 2023-09-28 PROCEDURE — 80048 BASIC METABOLIC PNL TOTAL CA: CPT | Performed by: STUDENT IN AN ORGANIZED HEALTH CARE EDUCATION/TRAINING PROGRAM

## 2023-09-28 PROCEDURE — 99215 OFFICE O/P EST HI 40 MIN: CPT | Mod: PBBFAC,PN | Performed by: STUDENT IN AN ORGANIZED HEALTH CARE EDUCATION/TRAINING PROGRAM

## 2023-09-28 PROCEDURE — 99214 PR OFFICE/OUTPT VISIT, EST, LEVL IV, 30-39 MIN: ICD-10-PCS | Mod: S$PBB,,, | Performed by: STUDENT IN AN ORGANIZED HEALTH CARE EDUCATION/TRAINING PROGRAM

## 2023-09-28 NOTE — PROGRESS NOTES
Karlie Carrion  1955        Subjective     Chief Complaint: F/u    History of Present Illness:  Ms. Karlie Carrion is a 68 y.o. female who presents to clinic for f/u.    Mouth lesions-Has spot left side posterior under her tongue. Feels it from the inside. Another one is in the inner cheek on the left. Noticed them 2-3 weeks ago. Both sort of started around the same time. Similar pain/feeling to a canker sore. Has had those in the past. Wears dentures. Has had dentist adjust them before, not recently. Also some mild sore throat. Also may have felt some swollen glands under that area.     No URI symptoms. No fever. No new fatigue. No vomiting.     Has not smoked cigarettes 10-12 years. No dip or pouches.     Stress fracture pain still present. Also at ball of foot. Seeing specialist, scheduled.     Osteoporosis- has been trying to exercise. Bike. Pool broken but likes swimming.    Review of Systems   Constitutional:  Negative for fever and malaise/fatigue.   HENT:  Positive for sore throat. Negative for congestion.    Eyes:  Negative for blurred vision and double vision.   Respiratory:  Negative for cough and wheezing.    Gastrointestinal:  Negative for nausea and vomiting.   Musculoskeletal:  Positive for joint pain.   Neurological:  Negative for sensory change and speech change.   Psychiatric/Behavioral:  The patient is not nervous/anxious.         PAST HISTORY:     Past Medical History:   Diagnosis Date    HLD (hyperlipidemia)     Osteoporosis        History reviewed. No pertinent surgical history.    Family History   Problem Relation Age of Onset    Stroke Mother        Social History     Socioeconomic History    Marital status:    Tobacco Use    Smoking status: Former     Current packs/day: 0.00     Average packs/day: 1.5 packs/day for 25.0 years (37.5 ttl pk-yrs)     Types: Cigarettes     Start date:      Quit date:      Years since quittin.7    Smokeless tobacco: Never   Substance and Sexual  Activity    Alcohol use: Not Currently       MEDICATIONS & ALLERGIES:     Current Outpatient Medications on File Prior to Visit   Medication Sig    albuterol (VENTOLIN HFA) 90 mcg/actuation inhaler Inhale 2 puffs into the lungs every 6 (six) hours as needed for Wheezing. Rescue     No current facility-administered medications on file prior to visit.       Review of patient's allergies indicates:   Allergen Reactions    Hay fever and allergy relief        OBJECTIVE:     Vital Signs:  Vitals:    09/28/23 1011   BP: 120/74   BP Location: Left arm   Patient Position: Sitting   BP Method: Medium (Manual)   Pulse: (!) 55   SpO2: 97%   Weight: 60.7 kg (133 lb 13.1 oz)   Height: 5' (1.524 m)       Body mass index is 26.13 kg/m².     Physical Exam:  Physical Exam  Vitals and nursing note reviewed.   Constitutional:       General: She is not in acute distress.     Appearance: Normal appearance. She is not ill-appearing, toxic-appearing or diaphoretic.   HENT:      Head: Normocephalic and atraumatic.      Mouth/Throat:      Mouth: Mucous membranes are moist. Oral lesions present. No lacerations or angioedema.      Dentition: Has dentures (Removed).      Tongue: No lesions.      Pharynx: No posterior oropharyngeal erythema.      Tonsils: No tonsillar exudate.     Eyes:      General: No scleral icterus.        Right eye: No discharge.         Left eye: No discharge.      Conjunctiva/sclera: Conjunctivae normal.   Neck:      Vascular: No carotid bruit.     Pulmonary:      Effort: Pulmonary effort is normal. No respiratory distress.   Musculoskeletal:      Cervical back: Normal range of motion and neck supple. Tenderness present. No erythema or rigidity. Normal range of motion.   Lymphadenopathy:      Cervical: Cervical adenopathy present.   Neurological:      Mental Status: She is alert and oriented to person, place, and time. Mental status is at baseline.   Psychiatric:         Mood and Affect: Mood normal.         Behavior:  "Behavior normal.            Laboratory  Lab Results   Component Value Date    WBC 10.33 02/16/2023    HGB 13.7 02/16/2023    HCT 40.4 02/16/2023    MCV 90 02/16/2023     02/16/2023     Lab Results   Component Value Date     02/16/2023     02/16/2023    K 3.7 02/16/2023     02/16/2023    CO2 25 02/16/2023    BUN 13 02/16/2023    CREATININE 0.7 02/16/2023    CALCIUM 9.7 02/16/2023     No results found for: "INR", "PROTIME"  Lab Results   Component Value Date    HGBA1C 5.3 12/06/2022           Health Maintenance         Date Due Completion Date    Pneumococcal Vaccines (Age 65+) (1 - PCV) Never done ---    TETANUS VACCINE Never done ---    Shingles Vaccine (1 of 2) Never done ---    COVID-19 Vaccine (6 - Moderna series) 12/06/2022 10/11/2022    Influenza Vaccine (1) 09/01/2023 10/11/2022    Mammogram 12/13/2023 12/13/2022    Hemoglobin A1c (Prediabetes) 12/06/2023 12/6/2022    LDCT Lung Screen 01/23/2024 1/23/2023 (Done)    Override on 1/23/2023: Done    DEXA Scan 12/13/2024 12/13/2022    Lipid Panel 12/06/2027 12/6/2022    Colorectal Cancer Screening 04/25/2033 4/25/2023                ASSESSMENT & PLAN:   Ms. Karlie Carrion is a 68 y.o. female who was seen today in clinic for oral lesions x2 weeks. Some tender LN on that side. Will get US H/N. Refer to ENT urgently given tobacco hx.      1. Other lesions of oral mucosa  -     Ambulatory referral/consult to ENT; Future; Expected date: 10/05/2023  -     Cancel: US Soft Tissue Head Neck Thyroid; Future; Expected date: 09/28/2023  -     US Soft Tissue Head Neck Thyroid; Future; Expected date: 09/28/2023  -     US Soft Tissue Head Neck Thyroid; Future; Expected date: 09/28/2023    2. Former smoker  -     Ambulatory referral/consult to ENT; Future; Expected date: 10/05/2023  -     Cancel: US Soft Tissue Head Neck Thyroid; Future; Expected date: 09/28/2023  -     US Soft Tissue Head Neck Thyroid; Future; Expected date: 09/28/2023    3. LAD " (lymphadenopathy)  -     Cancel: US Soft Tissue Head Neck Thyroid; Future; Expected date: 09/28/2023  -     US Soft Tissue Head Neck Thyroid; Future; Expected date: 09/28/2023  -     US Soft Tissue Head Neck Thyroid; Future; Expected date: 09/28/2023    4. Hyperlipidemia, unspecified hyperlipidemia type  -     LIPID PANEL; Future; Expected date: 09/28/2023    5. Osteoporosis, unspecified osteoporosis type, unspecified pathological fracture presence  -     Vitamin D; Future; Expected date: 09/28/2023  -     BASIC METABOLIC PANEL; Future; Expected date: 09/28/2023    6. Stress fracture of right ankle, wyattela             Kasia Acosta MD  Internal Medicine         Portions of this note may have been generated using voice recognition software.  Please excuse any spelling/grammatical errors. Occasional wrong-word or sound-a-like substitutions may have also occurred due to the inherent limitations of voice recognition software. Please read the chart carefully and recognize, using context, where substitutions have occurred.

## 2023-09-29 ENCOUNTER — PATIENT MESSAGE (OUTPATIENT)
Dept: PRIMARY CARE CLINIC | Facility: CLINIC | Age: 68
End: 2023-09-29
Payer: MEDICARE

## 2023-09-29 ENCOUNTER — OFFICE VISIT (OUTPATIENT)
Dept: OTOLARYNGOLOGY | Facility: CLINIC | Age: 68
End: 2023-09-29
Payer: MEDICARE

## 2023-09-29 VITALS
WEIGHT: 130.94 LBS | DIASTOLIC BLOOD PRESSURE: 68 MMHG | SYSTOLIC BLOOD PRESSURE: 146 MMHG | BODY MASS INDEX: 25.71 KG/M2 | HEIGHT: 60 IN | HEART RATE: 62 BPM

## 2023-09-29 DIAGNOSIS — Z87.891 FORMER SMOKER: ICD-10-CM

## 2023-09-29 DIAGNOSIS — K13.79 OTHER LESIONS OF ORAL MUCOSA: ICD-10-CM

## 2023-09-29 DIAGNOSIS — R59.0 LOCALIZED ENLARGED LYMPH NODES: Primary | ICD-10-CM

## 2023-09-29 PROCEDURE — 99999 PR PBB SHADOW E&M-EST. PATIENT-LVL III: ICD-10-PCS | Mod: PBBFAC,,, | Performed by: STUDENT IN AN ORGANIZED HEALTH CARE EDUCATION/TRAINING PROGRAM

## 2023-09-29 PROCEDURE — 99204 OFFICE O/P NEW MOD 45 MIN: CPT | Mod: 25,S$PBB,, | Performed by: STUDENT IN AN ORGANIZED HEALTH CARE EDUCATION/TRAINING PROGRAM

## 2023-09-29 PROCEDURE — 31575 DIAGNOSTIC LARYNGOSCOPY: CPT | Mod: PBBFAC | Performed by: STUDENT IN AN ORGANIZED HEALTH CARE EDUCATION/TRAINING PROGRAM

## 2023-09-29 PROCEDURE — 99204 PR OFFICE/OUTPT VISIT, NEW, LEVL IV, 45-59 MIN: ICD-10-PCS | Mod: 25,S$PBB,, | Performed by: STUDENT IN AN ORGANIZED HEALTH CARE EDUCATION/TRAINING PROGRAM

## 2023-09-29 PROCEDURE — 31575 DIAGNOSTIC LARYNGOSCOPY: CPT | Mod: S$PBB,,, | Performed by: STUDENT IN AN ORGANIZED HEALTH CARE EDUCATION/TRAINING PROGRAM

## 2023-09-29 PROCEDURE — 31575 PR LARYNGOSCOPY, FLEXIBLE; DIAGNOSTIC: ICD-10-PCS | Mod: S$PBB,,, | Performed by: STUDENT IN AN ORGANIZED HEALTH CARE EDUCATION/TRAINING PROGRAM

## 2023-09-29 PROCEDURE — 99213 OFFICE O/P EST LOW 20 MIN: CPT | Mod: PBBFAC | Performed by: STUDENT IN AN ORGANIZED HEALTH CARE EDUCATION/TRAINING PROGRAM

## 2023-09-29 PROCEDURE — 99999 PR PBB SHADOW E&M-EST. PATIENT-LVL III: CPT | Mod: PBBFAC,,, | Performed by: STUDENT IN AN ORGANIZED HEALTH CARE EDUCATION/TRAINING PROGRAM

## 2023-09-29 NOTE — PROGRESS NOTES
Note to patients: In accordance with the  Century Cures Act, patients are now granted immediate electronic access to their medical records. This note is primarily intended for communication among medical professionals. As a result, it may incorporate medical terminology, abbreviations, or language that could appear blunt or unfamiliar. If you have questions about this document, we encourage you to discuss it with your physician.      [facility]  [facility]    Patient: Karlie Carrion    : 1955    MRN: [id number]  Primary Care Provider: Kasia Acosta MD  Referring Provider: Kasia Acosta MD   Date of Encounter: 2023    DIAGNOSIS:     CC:   Chief Complaint   Patient presents with    Mass       HPI:   Karlie Carrion is a 68 y.o. female (former smoker, quit 10 years ago) presenting for left lymphadenopathy and new oral sores.    She reports that she first noticed some fullness in her left upper neck about 3 weeks ago. Starting about 1 week ago, she noticed oral soreness, especially below her tongue and in the back of her jaw. She has also had a sore throat.    She was seen by her PCP who noticed some oral lesions in her left mouth as well as the lymphadenopathy.    Patient currently smokes marijuana, quit tobacco many years ago.    Patient denies pain, fever, chills, night sweats, unintentional weight loss, enlarged lymph nodes outside of the head or neck, odynophagia, dysphagia, globus sensation, voice changes, cough, hemoptysis, shortness of breath, or new or concerning skin lesions. Patient denies personal or family history of head and neck cancer. Patient denies history of radiation exposure.    Patient is retired, formerly . Lives in [facility].    TREATMENT HISTORY:  None    SUBSTANCE USE:  Smoking: Former  Alcohol: No  Denies hookah, chewing tobacco, betel nut, illicit drug, heavy cigar, vape use. +marijuana    ALLERGIES:  Review of patient's  allergies indicates:   Allergen Reactions    Hay fever and allergy relief          MEDICATIONS:    Current Outpatient Medications:     albuterol (VENTOLIN HFA) 90 mcg/actuation inhaler, Inhale 2 puffs into the lungs every 6 (six) hours as needed for Wheezing. Rescue, Disp: 18 g, Rfl: 0    PAST MEDICAL HISTORY:  Past Medical History:   Diagnosis Date    HLD (hyperlipidemia)     Osteoporosis         PAST SURGICAL HISTORY:  No past surgical history on file.     FAMILY HISTORY:  Family History   Problem Relation Age of Onset    Stroke Mother        SOCIAL HISTORY:  Social History     Socioeconomic History    Marital status:    Tobacco Use    Smoking status: Former     Current packs/day: 0.00     Average packs/day: 1.5 packs/day for 25.0 years (37.5 ttl pk-yrs)     Types: Cigarettes     Start date:      Quit date:      Years since quittin.7    Smokeless tobacco: Never   Substance and Sexual Activity    Alcohol use: Not Currently     See above substance history    REVIEW OF SYSTEMS:   Comprehensive review of systems was discussed with the patient.    Answers submitted by the patient for this visit:  Review of Symptoms Questionnaire  (Submitted on 2023)  None of these: Yes  mouth sores: Yes  sore throat: Yes  None of these : Yes  None of these: Yes  None of these : Yes  None of these: Yes  None of these: Yes  None of these: Yes  None of these : Yes  Seasonal Allergies?: Yes  None of these : Yes  None of these: Yes  None of these: Yes  None of these: Yes    It is positive only for the above complaints.    PHYSICAL EXAMINATION:  Blood pressure (!) 146/68, pulse 62, height 5' (1.524 m), weight 59.4 kg (130 lb 15.3 oz).  Constitutional: Non-toxic appearing.   Psychiatric: Appropriate mood and affect. Cooperative.  Voice: Non-dysphonic, speaking in full sentences.   Neurologic: Cranial nerves grossly intact, no focal deficits.  Head and face: Salivary glands are not enlarged. Face is symmetric. CN VII  strength and sensation intact.  Skin: No concerning skin lesions.   Eyes: Vision grossly intact, bilateral extraocular movements intact  Ears: Bilateral pinna, mastoid, external ear canal normal. Hearing intact.   Nose: External nose appears normal.   Lips: No ulcers or lesions  Oral cavity/OP: Mucosa is pink and moist. Some erythema in left retromolar trigone, small lesion on soft palate on left.   Base of tongue with soft tonsillar tissue bilaterally.   Posterior pharyngeal wall normal. Tonsils are normal/postsurgically absent. No lesions. Left sublingual area tender on palpation, no masses or lesions, clear saliva from duct  Neck: Soft and flat, left tender normal sized lymph nodes in left level 2 with infraauricular fullness. No palpable thyroid enlargement or nodules.  Respiratory: Chest expansion symmetric, no audible stridor or stertor. Breathing is unlabored. No active cough.    PROCEDURES:    FLEXIBLE LARYNGOSCOPY  Provider: Heidi Lyon MD  Indication: Sore throat  The procedure was explained to the patient and verbal consent was obtained.   Anesthesia: topical lidocaine and neosynephrine applied within one or both nares with an atomizer    The scope was introduced in the usual fashion.    Findings:  Mucosa: normal  Inferior turbinates: no polypoid changes or hypertrophy  Septum: no lesion or ulcer  Middle meatus: no purulence or polypoid change  Nasopharynx:  Adenoids: normal  Fossa of Rosenmuller: normal  Eustachian tubes: normal  Superior and posterior pharyngeal walls: normal   Epiglottis, vallecula, and base of tongue: normal, bilateral large lingual tonsils  Pyriform sinuses: no pooling of secretions or saliva in pyriform sinuses, mucosa normal  False vocal cords, arytenoids, aryepiglottic folds: normal  True vocal cords are symmetrically mobile on phonation and inspiration.   Laryngeal sensation appears intact. There are no masses or ulcerations.     The scope was withdrawn. The patient tolerated  the procedure well with no complications.      ASSESSMENT AND PLAN:  1. Localized enlarged lymph nodes    2. Other lesions of oral mucosa    3. Former smoker         Karlie Carrion is a 68 y.o. female with new oral tenderness, left LAD. Erythema and small oral lesions but no particularly concerning findings.  - Acute/subacute onset more consistent with infectious etiology, but would err on the side of imaging given the persistence of LAD >3 weeks and patient's heavy smoking history  Orders Placed This Encounter   Procedures    CT Soft Tissue Neck With Contrast        Patient encouraged to call with any questions, concerns, or new or worsening symptoms.     Follow up 2 weeks for repeat exam after CT

## 2023-10-02 ENCOUNTER — OFFICE VISIT (OUTPATIENT)
Dept: ORTHOPEDICS | Facility: CLINIC | Age: 68
End: 2023-10-02
Payer: MEDICARE

## 2023-10-02 ENCOUNTER — HOSPITAL ENCOUNTER (OUTPATIENT)
Dept: RADIOLOGY | Facility: HOSPITAL | Age: 68
Discharge: HOME | End: 2023-10-02
Attending: ORTHOPAEDIC SURGERY
Payer: MEDICARE

## 2023-10-02 DIAGNOSIS — M79.671 BILATERAL FOOT PAIN: ICD-10-CM

## 2023-10-02 DIAGNOSIS — M79.672 BILATERAL FOOT PAIN: ICD-10-CM

## 2023-10-02 DIAGNOSIS — M19.072 ARTHRITIS OF FIRST METATARSOPHALANGEAL (MTP) JOINTS OF BOTH FEET: Primary | ICD-10-CM

## 2023-10-02 DIAGNOSIS — M19.071 ARTHRITIS OF FIRST METATARSOPHALANGEAL (MTP) JOINTS OF BOTH FEET: Primary | ICD-10-CM

## 2023-10-02 PROCEDURE — 99999 PR PBB SHADOW E&M-EST. PATIENT-LVL II: CPT | Mod: PBBFAC,,, | Performed by: ORTHOPAEDIC SURGERY

## 2023-10-02 PROCEDURE — 73630 X-RAY EXAM OF FOOT: CPT | Mod: TC,50

## 2023-10-02 PROCEDURE — 73630 X-RAY EXAM OF FOOT: CPT | Mod: 26,50,, | Performed by: RADIOLOGY

## 2023-10-02 PROCEDURE — 99203 PR OFFICE/OUTPT VISIT, NEW, LEVL III, 30-44 MIN: ICD-10-PCS | Mod: S$PBB,,, | Performed by: ORTHOPAEDIC SURGERY

## 2023-10-02 PROCEDURE — 73630 XR FOOT COMPLETE 3 VIEW BILATERAL: ICD-10-PCS | Mod: 26,50,, | Performed by: RADIOLOGY

## 2023-10-02 PROCEDURE — 99203 OFFICE O/P NEW LOW 30 MIN: CPT | Mod: S$PBB,,, | Performed by: ORTHOPAEDIC SURGERY

## 2023-10-02 PROCEDURE — 99212 OFFICE O/P EST SF 10 MIN: CPT | Mod: PBBFAC | Performed by: ORTHOPAEDIC SURGERY

## 2023-10-02 PROCEDURE — 99999 PR PBB SHADOW E&M-EST. PATIENT-LVL II: ICD-10-PCS | Mod: PBBFAC,,, | Performed by: ORTHOPAEDIC SURGERY

## 2023-10-02 NOTE — PROGRESS NOTES
DATE: 10/2/2023  PATIENT: Karlie Carrion    CHIEF COMPLAINT: bilateral toe pain    HISTORY:  Karlie Carrion is a 68 y.o. female here for initial evaluation of bilateral great toe pain.  The patient reports years of bilateral 1st MTP pain, right worse than left.  She does report dropping a heavy stool in the right foot 1 year ago, but her symptoms preceded that injury.  The pain is located in the bilateral MTP joints and does not radiate.  She does endorse a separate burning sensation over the tip of the right distal phalanx.  The pain in the right is exacerbated with tight shoe wear as it causes significant pain over the dorsal aspect of the foot.  The pain in the left is exacerbated with activity, but is much milder pain and does not bother her as often.  She has had multiple corticosteroid injections in these toes.  The 1st round of injections provided good relief for many months, but the 2nd injections resulted in a reaction a few days after the injection which caused significant pain.  The patient remains active and does both weightlifting and conditioning exercises.    PAST MEDICAL/SURGICAL HISTORY:  Past Medical History:   Diagnosis Date    HLD (hyperlipidemia)     Osteoporosis      History reviewed. No pertinent surgical history.    Current Medications:   Current Outpatient Medications:     albuterol (VENTOLIN HFA) 90 mcg/actuation inhaler, Inhale 2 puffs into the lungs every 6 (six) hours as needed for Wheezing. Rescue, Disp: 18 g, Rfl: 0    Social History:   Social History     Socioeconomic History    Marital status:    Tobacco Use    Smoking status: Former     Current packs/day: 0.00     Average packs/day: 1.5 packs/day for 25.0 years (37.5 ttl pk-yrs)     Types: Cigarettes     Start date:      Quit date: 2014     Years since quittin.7    Smokeless tobacco: Never   Substance and Sexual Activity    Alcohol use: Not Currently       REVIEW OF SYSTEMS:  Constitutional: Denies fever/chills    Neurological: Denies numbness/tingling (any exceptions noted in orthopaedic exam)    Psychiatric/Behavioral: Denies change in normal mood   Eyes: Denies change in vision   Cardiovascular: Denies chest pain   Respiratory: Denies shortness of breath   Hematologic/Lymphatic: Denies easy bleeding/bruising    Skin: Denies new rash or skin lesions    Gastrointestinal: Denies nausea/vomitting/diarrhea, change in bowel habits, abdominal pain    Allergic/Immunologic: Denies adverse reactions to current medications   Musculoskeletal: see HPI     PHYSICAL EXAMINATION:  General: The patient is a 68 y.o. female in no apparent distress.  Psych: Normal mood and affect  HEENT:  NCAT, sclera nonicteric  Lungs:  Respirations are equal and unlabored.    Bilateral Foot and Ankle Exam    INSPECTION:        Gait:    Normal    Scars:   None   Swelling:  Mild over the dorsal right MTP joint; none on the left   Color:   Normal     ALIGNMENT:    Forefoot: Normal    TENDERNESS:  FOOT:    MT / MT heads:  Positive over the dorsal aspect of the right 1st MTP joint; none on the left    Web space:   None        RANGE OF MOTION:  RIGHT/ LEFT       First MTP DF/PF: 60*/25 70/25              (* = pain)                STRENGTH: (affected)  EHL:   5/5  FHL:   5/5     NEUROLOGIC TESTING:  All dermatomes foot, ankle and leg have normal sensation light touch    VASCULAR:    2+ pulses PT/DT with brisk capillary refill toes.    IMAGING:   Radiographs of the bilateral feet were ordered and personally reviewed with the patient today.  They demonstrate bilateral 1st MTP arthritis, right worse than left.  There are also prominent osteophytes of the dorsal aspect of the right 1st metatarsal.  X-rays otherwise without significant degenerative changes or acute osseous abnormalities.    ASSESSMENT/PLAN:  Diagnoses and all orders for this visit:    Arthritis of first metatarsophalangeal (MTP) joints of both feet  -     X-Ray Foot Complete Bilateral;  Future    Karlie Carrion is a 68 y.o. female with bilateral 1st MTP arthritis, right worse than left.  The majority of her pain seems to be located in the right foot and is exacerbated with direct pressure over the osteophytes.  She does not have significant pain with range of motion of the bilateral MTP joints and has relatively well-preserved motion.  Nonoperative and operative treatments were discussed with the patient.  Nonoperative treatments include activity modifications, shoe wear modifications, Gooden's extension orthoses, anti-inflammatory medications, ice, and corticosteroid injections.  Operative interventions such as cheilectomy and fusion were discussed, but I think she is a candidate for cheilectomy as the pain is largely from osteophyte and she does not have significant pain with joint range of motion.  After discussion with the patient, plan for a trial of nonoperative treatment.  She will get some over-the-counter Gooden's extension orthoses and wear shoes that do not place a lot of direct pressure over the MTP joints.  She can continue to take over-the-counter medications and use ice as needed for pain control as well.  She will follow up on an as-needed basis.    I have personally taken the history and examined this patient and agree with the residents note as stated above.

## 2023-10-03 ENCOUNTER — HOSPITAL ENCOUNTER (OUTPATIENT)
Dept: RADIOLOGY | Facility: HOSPITAL | Age: 68
Discharge: HOME | End: 2023-10-03
Attending: STUDENT IN AN ORGANIZED HEALTH CARE EDUCATION/TRAINING PROGRAM
Payer: MEDICARE

## 2023-10-03 DIAGNOSIS — R59.0 LOCALIZED ENLARGED LYMPH NODES: ICD-10-CM

## 2023-10-03 PROCEDURE — 70491 CT SOFT TISSUE NECK W/DYE: CPT | Mod: TC

## 2023-10-03 PROCEDURE — 70491 CT SOFT TISSUE NECK W/DYE: CPT | Mod: 26,,, | Performed by: RADIOLOGY

## 2023-10-03 PROCEDURE — 70491 CT SOFT TISSUE NECK WITH CONTRAST: ICD-10-PCS | Mod: 26,,, | Performed by: RADIOLOGY

## 2023-10-03 PROCEDURE — 25500020 PHARM REV CODE 255: Performed by: STUDENT IN AN ORGANIZED HEALTH CARE EDUCATION/TRAINING PROGRAM

## 2023-10-03 RX ADMIN — IOHEXOL 75 ML: 350 INJECTION, SOLUTION INTRAVENOUS at 10:10

## 2023-10-17 ENCOUNTER — OFFICE VISIT (OUTPATIENT)
Dept: OTOLARYNGOLOGY | Facility: CLINIC | Age: 68
End: 2023-10-17
Payer: MEDICARE

## 2023-10-17 VITALS
HEIGHT: 60 IN | HEART RATE: 75 BPM | BODY MASS INDEX: 24.68 KG/M2 | SYSTOLIC BLOOD PRESSURE: 137 MMHG | WEIGHT: 125.69 LBS | DIASTOLIC BLOOD PRESSURE: 70 MMHG

## 2023-10-17 DIAGNOSIS — K13.79 OTHER LESIONS OF ORAL MUCOSA: Primary | ICD-10-CM

## 2023-10-17 PROCEDURE — 31575 PR LARYNGOSCOPY, FLEXIBLE; DIAGNOSTIC: ICD-10-PCS | Mod: 51,S$PBB,, | Performed by: STUDENT IN AN ORGANIZED HEALTH CARE EDUCATION/TRAINING PROGRAM

## 2023-10-17 PROCEDURE — 88305 TISSUE EXAM BY PATHOLOGIST: CPT | Performed by: STUDENT IN AN ORGANIZED HEALTH CARE EDUCATION/TRAINING PROGRAM

## 2023-10-17 PROCEDURE — 99214 OFFICE O/P EST MOD 30 MIN: CPT | Mod: 25,S$PBB,, | Performed by: STUDENT IN AN ORGANIZED HEALTH CARE EDUCATION/TRAINING PROGRAM

## 2023-10-17 PROCEDURE — 42800 BIOPSY OF THROAT: CPT | Mod: S$PBB,,, | Performed by: STUDENT IN AN ORGANIZED HEALTH CARE EDUCATION/TRAINING PROGRAM

## 2023-10-17 PROCEDURE — 42800 BIOPSY OF THROAT: CPT | Mod: PBBFAC | Performed by: STUDENT IN AN ORGANIZED HEALTH CARE EDUCATION/TRAINING PROGRAM

## 2023-10-17 PROCEDURE — 99999 PR PBB SHADOW E&M-EST. PATIENT-LVL III: ICD-10-PCS | Mod: PBBFAC,,, | Performed by: STUDENT IN AN ORGANIZED HEALTH CARE EDUCATION/TRAINING PROGRAM

## 2023-10-17 PROCEDURE — 88305 TISSUE EXAM BY PATHOLOGIST: ICD-10-PCS | Mod: 26,,, | Performed by: STUDENT IN AN ORGANIZED HEALTH CARE EDUCATION/TRAINING PROGRAM

## 2023-10-17 PROCEDURE — 99214 PR OFFICE/OUTPT VISIT, EST, LEVL IV, 30-39 MIN: ICD-10-PCS | Mod: 25,S$PBB,, | Performed by: STUDENT IN AN ORGANIZED HEALTH CARE EDUCATION/TRAINING PROGRAM

## 2023-10-17 PROCEDURE — 99999 PR PBB SHADOW E&M-EST. PATIENT-LVL III: CPT | Mod: PBBFAC,,, | Performed by: STUDENT IN AN ORGANIZED HEALTH CARE EDUCATION/TRAINING PROGRAM

## 2023-10-17 PROCEDURE — 31575 DIAGNOSTIC LARYNGOSCOPY: CPT | Mod: 51,S$PBB,, | Performed by: STUDENT IN AN ORGANIZED HEALTH CARE EDUCATION/TRAINING PROGRAM

## 2023-10-17 PROCEDURE — 88305 TISSUE EXAM BY PATHOLOGIST: CPT | Mod: 26,,, | Performed by: STUDENT IN AN ORGANIZED HEALTH CARE EDUCATION/TRAINING PROGRAM

## 2023-10-17 PROCEDURE — 42800 PR BIOPSY OROPHARYNX: ICD-10-PCS | Mod: S$PBB,,, | Performed by: STUDENT IN AN ORGANIZED HEALTH CARE EDUCATION/TRAINING PROGRAM

## 2023-10-17 PROCEDURE — 99213 OFFICE O/P EST LOW 20 MIN: CPT | Mod: 25,PBBFAC | Performed by: STUDENT IN AN ORGANIZED HEALTH CARE EDUCATION/TRAINING PROGRAM

## 2023-10-17 NOTE — PROGRESS NOTES
Note to patients: In accordance with the  Cent[employer] Cures Act, patients are now granted immediate electronic access to their medical records. This note is primarily intended for communication among medical professionals. As a result, it may incorporate medical terminology, abbreviations, or language that could appear blunt or unfamiliar. If you have questions about this document, we encourage you to discuss it with your physician.      [facility]    Patient: Karlie Carrion    : 1955    MRN: [id number]  Primary Care Provider/Referring: Kasia Acosta MD  Date of Encounter: 10/17/2023    DIAGNOSIS:     CC:   Chief Complaint   Patient presents with    Follow-up       HPI 23:   Karlie Carrion is a 68 y.o. female (former smoker, quit 10 years ago) presenting for left lymphadenopathy and new oral sores.    She reports that she first noticed some fullness in her left upper neck about 3 weeks ago. Starting about 1 week ago, she noticed oral soreness, especially below her tongue and in the back of her jaw. She has also had a sore throat.    She was seen by her PCP who noticed some oral lesions in her left mouth as well as the lymphadenopathy.    Patient currently smokes marijuana, quit tobacco many years ago.    Patient denies pain, fever, chills, night sweats, unintentional weight loss, enlarged lymph nodes outside of the head or neck, odynophagia, dysphagia, globus sensation, voice changes, cough, hemoptysis, shortness of breath, or new or concerning skin lesions. Patient denies personal or family history of head and neck cancer. Patient denies history of radiation exposure.    Patient is retired, formerly . Lives in [place].    Interval 10/17/23:  Patient returns today after CT for repeat exam. Reports her pain has much improved, although still some left oral tongue tenderness on the area that is rubbing against her teeth.    TREATMENT HISTORY:  None    SUBSTANCE USE:  Smoking:  Former  Alcohol: No  Denies hookah, chewing tobacco, betel nut, illicit drug, heavy cigar, vape use. +marijuana    ALLERGIES:  Review of patient's allergies indicates:   Allergen Reactions    Hay fever and allergy relief          MEDICATIONS:    Current Outpatient Medications:     albuterol (VENTOLIN HFA) 90 mcg/actuation inhaler, Inhale 2 puffs into the lungs every 6 (six) hours as needed for Wheezing. Rescue, Disp: 18 g, Rfl: 0    PAST MEDICAL HISTORY:  Past Medical History:   Diagnosis Date    HLD (hyperlipidemia)     Osteoporosis         PAST SURGICAL HISTORY:  No past surgical history on file.     FAMILY HISTORY:  Family History   Problem Relation Age of Onset    Stroke Mother        SOCIAL HISTORY:  Social History     Socioeconomic History    Marital status:    Tobacco Use    Smoking status: Former     Current packs/day: 0.00     Average packs/day: 1.5 packs/day for 25.0 years (37.5 ttl pk-yrs)     Types: Cigarettes     Start date:      Quit date:      Years since quittin.7    Smokeless tobacco: Never   Substance and Sexual Activity    Alcohol use: Not Currently     See above substance history    REVIEW OF SYSTEMS:   Comprehensive review of systems was discussed with the patient.    Answers submitted by the patient for this visit:  Review of Symptoms Questionnaire  (Submitted on 10/10/2023)  None of these: Yes  mouth sores: Yes  sore throat: Yes  None of these : Yes  None of these: Yes  None of these : Yes  None of these: Yes  None of these: Yes  None of these: Yes  None of these : Yes  Seasonal Allergies?: Yes  None of these : Yes  None of these: Yes  None of these: Yes  None of these: Yes    It is positive only for the above complaints.    PHYSICAL EXAMINATION:  Blood pressure 137/70, pulse 75, height 5' (1.524 m), weight 57 kg (125 lb 10.6 oz).  Constitutional: Non-toxic appearing.   Psychiatric: Appropriate mood and affect. Cooperative.  Voice: Non-dysphonic, speaking in full sentences.    Neurologic: Cranial nerves grossly intact, no focal deficits.  Head and face: Salivary glands are not enlarged. Face is symmetric. CN VII strength and sensation intact.  Skin: No concerning skin lesions.   Eyes: Vision grossly intact, bilateral extraocular movements intact  Ears: Bilateral pinna, mastoid, external ear canal normal. Hearing intact.   Nose: External nose appears normal.   Lips: No ulcers or lesions  Oral cavity/OP: Mucosa is pink and moist. Some erythema in left retromolar trigone, small lesion (?papilloma) on soft palate/tonsillar pillar on left.   Base of tongue with soft tonsillar tissue bilaterally.   Posterior pharyngeal wall normal. Tonsils are normal/postsurgically absent. No lesions. Left sublingual area tender on palpation, no masses or lesions, clear saliva from duct  Neck: Soft and flat, left tender normal sized lymph nodes in left level 2 with infraauricular fullness. No palpable thyroid enlargement or nodules.  Respiratory: Chest expansion symmetric, no audible stridor or stertor. Breathing is unlabored. No active cough.          PROCEDURES:    FLEXIBLE LARYNGOSCOPY (9/29/23)  Provider: Heidi Lyon MD  Indication: Sore throat  The procedure was explained to the patient and verbal consent was obtained.   Anesthesia: topical lidocaine and neosynephrine applied within one or both nares with an atomizer    The scope was introduced in the usual fashion.    Findings:  Mucosa: normal  Inferior turbinates: no polypoid changes or hypertrophy  Septum: no lesion or ulcer  Middle meatus: no purulence or polypoid change  Nasopharynx:  Adenoids: normal  Fossa of Rosenmuller: normal  Eustachian tubes: normal  Superior and posterior pharyngeal walls: normal   Epiglottis, vallecula, and base of tongue: normal, bilateral large lingual tonsils  Pyriform sinuses: no pooling of secretions or saliva in pyriform sinuses, mucosa normal  False vocal cords, arytenoids, aryepiglottic folds: normal  True vocal  cords are symmetrically mobile on phonation and inspiration.   Laryngeal sensation appears intact. There are no masses or ulcerations.     The scope was withdrawn. The patient tolerated the procedure well with no complications.      BIOPSY OF LEFT OROPHARYNX  Provider: Heidi Lyon MD  Indication: OP lesion  After verbal and written consent was obtained, the area was anesthetized with 1% lidocaine and 1:100,000 epinephrine solution.    Thereafter, a cupped forceps wwas used to harvest a representative specimen of mucosa, which was sent in formalin for routine evaluation.    Hemostasis was achieved with silver nitrate and pressure.    The patient tolerated the procedure well with no complications.    IMAGING:  CT neck 10/3/23 -  No lymphadenopathy throughout the neck.  Multiple subcentimeter thyroid hypodensities.  Stable 4 mm solid nodule in the right upper lobe.    RADIOLOGY REVIEWED:  I have independently viewed and agree with the CT images and reports which demonstrate no concerning areas for malignancy.    ASSESSMENT AND PLAN:  1. Other lesions of oral mucosa      Karlie Carrion is a 68 y.o. female with new oral tenderness, left LAD. Improved on exam today with negative CT scan, still with small left oropharyngeal lesion with appearance c/w papilloma.  - Biopsied left OP today - will call with results  - If result is benign, okay to follow-up 6-12 months as needed/desired for repeat exam    Patient encouraged to call with any questions, concerns, or new or worsening symptoms.     Follow up as needed 6-12mo, sooner if new symptoms or concerns

## 2023-10-21 ENCOUNTER — PATIENT MESSAGE (OUTPATIENT)
Dept: ORTHOPEDICS | Facility: CLINIC | Age: 68
End: 2023-10-21
Payer: MEDICARE

## 2023-10-21 DIAGNOSIS — M25.551 BILATERAL HIP PAIN: Primary | ICD-10-CM

## 2023-10-21 DIAGNOSIS — M25.552 BILATERAL HIP PAIN: Primary | ICD-10-CM

## 2023-10-24 LAB
FINAL PATHOLOGIC DIAGNOSIS: NORMAL
GROSS: NORMAL
Lab: NORMAL

## 2023-11-02 ENCOUNTER — PATIENT MESSAGE (OUTPATIENT)
Dept: ORTHOPEDICS | Facility: CLINIC | Age: 68
End: 2023-11-02
Payer: MEDICARE

## 2023-11-14 ENCOUNTER — PATIENT MESSAGE (OUTPATIENT)
Dept: PRIMARY CARE CLINIC | Facility: CLINIC | Age: 68
End: 2023-11-14
Payer: MEDICARE

## 2023-11-14 ENCOUNTER — HOSPITAL ENCOUNTER (OUTPATIENT)
Dept: RADIOLOGY | Facility: HOSPITAL | Age: 68
Discharge: HOME | End: 2023-11-14
Attending: ORTHOPAEDIC SURGERY
Payer: MEDICARE

## 2023-11-14 ENCOUNTER — OFFICE VISIT (OUTPATIENT)
Dept: ORTHOPEDICS | Facility: CLINIC | Age: 68
End: 2023-11-14
Payer: MEDICARE

## 2023-11-14 VITALS — BODY MASS INDEX: 26.02 KG/M2 | HEIGHT: 61 IN | WEIGHT: 137.81 LBS

## 2023-11-14 DIAGNOSIS — M25.551 BILATERAL HIP PAIN: ICD-10-CM

## 2023-11-14 DIAGNOSIS — M25.552 BILATERAL HIP PAIN: ICD-10-CM

## 2023-11-14 DIAGNOSIS — M70.62 GREATER TROCHANTERIC BURSITIS OF LEFT HIP: Primary | ICD-10-CM

## 2023-11-14 DIAGNOSIS — M70.61 GREATER TROCHANTERIC BURSITIS OF RIGHT HIP: ICD-10-CM

## 2023-11-14 PROCEDURE — 73521 X-RAY EXAM HIPS BI 2 VIEWS: CPT | Mod: 26,,, | Performed by: RADIOLOGY

## 2023-11-14 PROCEDURE — 73521 XR HIPS BILATERAL 2 VIEW INCL AP PELVIS: ICD-10-PCS | Mod: 26,,, | Performed by: RADIOLOGY

## 2023-11-14 PROCEDURE — 73521 X-RAY EXAM HIPS BI 2 VIEWS: CPT | Mod: TC

## 2023-11-14 PROCEDURE — 99999 PR PBB SHADOW E&M-EST. PATIENT-LVL III: CPT | Mod: PBBFAC,,, | Performed by: ORTHOPAEDIC SURGERY

## 2023-11-14 PROCEDURE — 99214 OFFICE O/P EST MOD 30 MIN: CPT | Mod: S$PBB,,, | Performed by: ORTHOPAEDIC SURGERY

## 2023-11-14 PROCEDURE — 99213 OFFICE O/P EST LOW 20 MIN: CPT | Mod: PBBFAC | Performed by: ORTHOPAEDIC SURGERY

## 2023-11-14 NOTE — PROGRESS NOTES
Subjective:     HPI:   Karlie Carrion is a 68 y.o. female who presents for eval R>L hip pain, self ref    Previously seen by Dr. Hooker 2023 for bilateral knee arthritis and received a right knee Euflexxa injection    She he is concerned about her DEXA scan results, she had a DEXA scan 2022 showing osteoporosis of her femoral necks bilaterally with T-scores of -2.5 and -2.9.  She says she talked about it with the primary care doctor decided against medications and to pursue weight-bearing exercise    She says when she walks a mile she would some posterior lateral left hip pain mostly on the left occasionally on the right she pauses and it goes away she denies any groin anterior thigh radicular pain or paresthesias    Medications: Aleve (200mg, PRN 1-2x a month)    Injections: None. R knee euflexxa    Physical Therapy: None.     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     Social support: The patient stated that they live at home with their partner . The patient stated that their partner  would be able to help take care of them if they were to have surgery.        ROS:  The updated medical history is in the chart and has been reviewed. A review of systems is updated and there is no reported vision changes, ear/nose/mouth/throat complaints,  chest pain, shortness of breath, abdominal pain, urological complaints, fevers or chills, psychiatric complaints. Musculoskeletal and neurologcial symptoms are as documented. All other systems are negative.      Objective:   Exam:  There were no vitals filed for this visit.  Body mass index is 26.03 kg/m².    Physical examination included assessment of the patient's general appearance with particular attention to development, nutrition, body habitus, attention to  grooming, and any evidence of distress.  Constitutional: The patient is a well-developed, well-nourished patient in no acute distress.   Cardiovascular: Vascular examination included warmth and capillary refill as well inspection for edema and assessment of pedal pulses. Pulses are palpable and regular.  Musculoskeletal: Gait was assessed as to whether it was steady, non-antalgic, and/or required the use of an assist device. The patient was also asked to walk independently and get onto the examination table.  Skin: The skin was examined for any obvious rashes or lesions in the extremity.  Neurologic: Sensation is intact to light touch in the extremity. The patient has good coordination without hyperreflexia and is alert and oriented to person, place and time and has normal mood and affect.     All of the above were examined and found to be within normal limits except for the following pertinent clinical findings:    No limp nonantalgic gait negative Trendelenburg.  She is tender over both greater trochanters left more than right no groin pain with active straight leg raise 0-120 hip flexion 40 abduction 30 adduction 40 external 30 internal rotation with no pain no significant limb length discrepancy supine      Imaging:    Hip x-rays show mild very low-grade degenerative changes in both hips but significant lumbosacral spine degenerative changes    Previous x-rays showed grade 4 varus right knee arthritis      Assessment:       ICD-10-CM ICD-9-CM   1. Greater trochanteric bursitis of left hip  M70.62 726.5   2. Bilateral hip pain  M25.551 719.45    M25.552    3. Greater trochanteric bursitis of right hip  M70.61 726.5      Osteoporosis and history of ankle stress fracture vitamin-D is 40  Emphysema  Pre diabetes     Plan:       At this point I feel the patient has a diagnosis of trochanteric bursitis.  We discussed all the treatment options including but not limited to, the use of non-steroidal anti-inflammatory  drugs (NSAIDs), physical therapy visits for stretching/strengthening and modalities treatment, as well as a consistent stretching program at home.  We further discussed the role of corticosteroid injections into the trochanteric bursa.  I explained that this is a short-term solution, however it does often allow for return to physical therapy and stretching and the ability to alleviate the acute pain.      The above findings were discussed with patient length. At this point I do believe that the patients discomfort is mostly related to a lower back pain exacerbation.  The treatment of lower back pain was discussed with the patient and it includes a course of anti-inflammatory medications, rest, and physical therapy for lower back stretching and strengthening.  All of the patients questions were answered.    I explained the potentially adverse gastric, cardiac, and renal effects of NSAIDs and explained that if the patient wishes to take it for longer that the patient should discuss this with their primary care physician to determine if it is safe to do so.    I think her hip pain is some combination of trochanteric bursitis and low back pain.  I do not think she has symptomatic hip arthritis.    We will start with anti-inflammatories like Aleve and Tylenol, hip home exercise program, bursitis info, physical therapy for her back and bursitis.      I recommend she see Raheel Purcell to discuss osteoporosis medications    Rec f/u sports medicine for non op bursitis management in 6 weeks    Orders Placed This Encounter   Procedures    Ambulatory referral/consult to Physical/Occupational Therapy     Standing Status:   Future     Standing Expiration Date:   12/14/2024     Referral Priority:   Routine     Referral Type:   Physical Medicine     Referral Reason:   Specialty Services Required     Requested Specialty:   Physical Therapy     Number of Visits Requested:   1    Ambulatory referral/consult to Sports Medicine      Standing Status:   Future     Number of Occurrences:   1     Standing Expiration Date:   12/14/2024     Referral Priority:   Routine     Referral Type:   Consultation     Referral Reason:   Specialty Services Required     Requested Specialty:   Sports Medicine     Number of Visits Requested:   1             Past Medical History:   Diagnosis Date    HLD (hyperlipidemia)     Osteoporosis        Past Surgical History:   Procedure Laterality Date    CHEILECTOMY Right 12/20/2023    Procedure: CHEILECTOMY;  Surgeon: Ian Strickland MD;  Location: [facility];  Service: Orthopedics;  Laterality: Right;    KNEE ARTHROSCOPY Bilateral        Family History   Problem Relation Age of Onset    Stroke Mother     Ovarian cancer Other         cousin-paternal       Social History     Socioeconomic History    Marital status:    Tobacco Use    Smoking status: Former     Current packs/day: 0.00     Average packs/day: 1.5 packs/day for 25.0 years (37.5 ttl pk-yrs)     Types: Cigarettes     Start date: 1989     Quit date: 2014     Years since quitting: 10.1    Smokeless tobacco: Never   Substance and Sexual Activity    Alcohol use: Not Currently    Drug use: Yes     Types: Marijuana     Comment: daily use, yesterday last use     Social Determinants of Health     Financial Resource Strain: Patient Declined (1/5/2024)    Overall Financial Resource Strain (CARDIA)     Difficulty of Paying Living Expenses: Patient declined   Food Insecurity: No Food Insecurity (1/5/2024)    Hunger Vital Sign     Worried About Running Out of Food in the Last Year: Never true     Ran Out of Food in the Last Year: Never true   Transportation Needs: No Transportation Needs (1/5/2024)    PRAPARE - Transportation     Lack of Transportation (Medical): No     Lack of Transportation (Non-Medical): No   Physical Activity: Sufficiently Active (1/5/2024)    Exercise Vital Sign     Days of Exercise per Week: 3 days     Minutes of Exercise per Session: 60 min    Stress: No Stress Concern Present (1/5/2024)    [employer] - Occupational Stress Questionnaire     Feeling of Stress : Not at all   Social Connections: Unknown (1/5/2024)    Social Connection and Isolation Panel [NHANES]     Frequency of Communication with Friends and Family: More than three times a week     Frequency of Social Gatherings with Friends and Family: Patient declined     Active Member of Clubs or Organizations: Yes     Attends Club or Organization Meetings: More than 4 times per year     Marital Status:    Housing Stability: Low Risk  (1/5/2024)    Housing Stability Vital Sign     Unable to Pay for Housing in the Last Year: No     Number of Places Lived in the Last Year: 1     Unstable Housing in the Last Year: No

## 2023-11-16 ENCOUNTER — PATIENT MESSAGE (OUTPATIENT)
Dept: PRIMARY CARE CLINIC | Facility: CLINIC | Age: 68
End: 2023-11-16
Payer: MEDICARE

## 2023-11-27 DIAGNOSIS — M20.21 HALLUX RIGIDUS OF RIGHT FOOT: ICD-10-CM

## 2023-11-27 DIAGNOSIS — M19.071 ARTHRITIS OF FIRST METATARSOPHALANGEAL (MTP) JOINT OF RIGHT FOOT: Primary | ICD-10-CM

## 2023-11-27 RX ORDER — CEFAZOLIN SODIUM 2 G/50ML
2 SOLUTION INTRAVENOUS
Status: CANCELLED | OUTPATIENT
Start: 2023-11-27

## 2023-11-30 ENCOUNTER — OFFICE VISIT (OUTPATIENT)
Dept: ORTHOPEDICS | Facility: CLINIC | Age: 68
End: 2023-11-30
Payer: MEDICARE

## 2023-11-30 DIAGNOSIS — M81.6 LOCALIZED OSTEOPOROSIS OF LEQUESNE: Primary | ICD-10-CM

## 2023-11-30 DIAGNOSIS — M81.0 OSTEOPOROSIS, UNSPECIFIED OSTEOPOROSIS TYPE, UNSPECIFIED PATHOLOGICAL FRACTURE PRESENCE: ICD-10-CM

## 2023-11-30 PROCEDURE — 99211 OFF/OP EST MAY X REQ PHY/QHP: CPT | Mod: PBBFAC

## 2023-11-30 PROCEDURE — 99214 PR OFFICE/OUTPT VISIT, EST, LEVL IV, 30-39 MIN: ICD-10-PCS | Mod: S$PBB,,, | Performed by: PHYSICIAN ASSISTANT

## 2023-11-30 PROCEDURE — 99214 OFFICE O/P EST MOD 30 MIN: CPT | Mod: S$PBB,,, | Performed by: PHYSICIAN ASSISTANT

## 2023-11-30 PROCEDURE — 99999 PR PBB SHADOW E&M-EST. PATIENT-LVL I: ICD-10-PCS | Mod: PBBFAC,,, | Performed by: PHYSICIAN ASSISTANT

## 2023-11-30 PROCEDURE — 99999 PR PBB SHADOW E&M-EST. PATIENT-LVL I: CPT | Mod: PBBFAC,,, | Performed by: PHYSICIAN ASSISTANT

## 2023-11-30 NOTE — PROGRESS NOTES
SUBJECTIVE:     Chief Complaint & History of Present Illness:  Karlie Carrion is a new patient 68 y.o. female who is seen here today for a bone health evaluation for osteoporosis, fracture prevention, and risk evaluation for future fractures.        she was appropriately identified and referred by ELVIN Sánchez MD due to concerns for compromised bone quality, and risk of future fractures.  This visit is medically necessary to identify risk, investigate and initiative treatment as appropriate to improve bone quality and strength for the reduction of secondary fractures.     her medical history, medications and fracture history will be reviewed and summarized      Review of patient's allergies indicates:   Allergen Reactions    Hay fever and allergy relief          Current Outpatient Medications   Medication Sig Dispense Refill    albuterol (VENTOLIN HFA) 90 mcg/actuation inhaler Inhale 2 puffs into the lungs every 6 (six) hours as needed for Wheezing. Rescue 18 g 0     No current facility-administered medications for this visit.       Past Medical History:   Diagnosis Date    HLD (hyperlipidemia)     Osteoporosis        History reviewed. No pertinent surgical history.    Lab Results   Component Value Date     09/28/2023    K 4.0 09/28/2023     09/28/2023    CO2 27 09/28/2023    GLU 85 09/28/2023    BUN 16 09/28/2023    CREATININE 0.8 09/28/2023    CALCIUM 9.5 09/28/2023    PROT 7.0 02/16/2023    ALBUMIN 3.5 02/16/2023    BILITOT 0.5 02/16/2023    ALKPHOS 84 02/16/2023    AST 17 02/16/2023    ALT 18 02/16/2023    ANIONGAP 7 (L) 09/28/2023      Lab Results   Component Value Date    WBC 10.33 02/16/2023    RBC 4.47 02/16/2023    HGB 13.7 02/16/2023    HCT 40.4 02/16/2023    MCV 90 02/16/2023    MCH 30.6 02/16/2023    MCHC 33.9 02/16/2023    RDW 11.6 02/16/2023     02/16/2023    MPV 11.0 02/16/2023    GRAN 6.5 02/16/2023    GRAN 63.1 02/16/2023    LYMPH 2.8 02/16/2023    LYMPH 27.4 02/16/2023    MONO 0.8  "02/16/2023    MONO 7.5 02/16/2023    EOS 0.1 02/16/2023    BASO 0.06 02/16/2023    EOSINOPHIL 1.2 02/16/2023    BASOPHIL 0.6 02/16/2023    DIFFMETHOD Automated 02/16/2023      No results found for: "MG"   No results found for: "PHOS"   Lab Results   Component Value Date    PHZWRZBX18UI 40 09/28/2023      No results found for: "PTH"   Lab Results   Component Value Date    TSH 1.679 12/06/2022      No results found for: "FREET4"   Lab Results   Component Value Date    HGBA1C 5.3 12/06/2022    ESTIMATEDAVG 105 12/06/2022      No results found for: "FREETESTOSTE"         Vital Signs (Most Recent)  There were no vitals filed for this visit.      Today's Visit and Bone Health History     Have you had any loss of height or gotten shorter since your 20's? 1   Are you postmenopausal? Yes   Please indicate how menopause occured. Naturally   Please indicate at what age you became postmenopausal. 52   Have you ever taken any type of hormone replacement therapy? Yes   If you have had hormone replacement therapy please indicate which hormone therapy was used and for how long. premann   Do you currently smoke? No   Have you ever smoked in the past? Yes   If yes, how many years? n/a   How many alcoholic beverages do you drink per day? 0   How many caffeinated beverages do you drink per day? 1 to 3   Have you had 2 or more falls in the past 12 months? No   How active have you been in the past 12 months? Very active ( walk more than 1 mile per day )   Did either of your parents have a hip fracture after the age of 50? No   Have you ever been diagnosed with any of the following? GERD   Do you currently have a fracture? No   Have you broken any other bones since you turned 52 or older? Yes   Please list all bones broken since you turned 52 or older. foot/ toes   Have you ever had a bone density test or DXA scan? Yes   Are you currently taking or have you ever taken any of the following medications? None   Do you take Calcium? Yes   If " you take Calcium what dose? 500 /100 a day   Do you take Vitamin D? Yes   If you take Vitamin D what dose? 25mg /// 1000 iu   Have you ever been diagnosed with cancer? No   Have you ever been treated for cancer with high beam radiation or had radioactive implants? No          she has medical history and medication use known to be associated with bone loss and osteoporosis to include previous diagnosis of osteoporosis by DEXA scan, multiple low energy fractures of the foot and ankle,.     The last DXA was performed in 10/2022.          T-Score Hip: -2.6     T-Score Spine: -2.4          Review of Systems:  ROS:  Constitutional: no fever or chills  Eyes: no visual changes  ENT: no nasal congestion or sore throat  Respiratory: no respiratory symptoms and sensory lobular emphysema  Cardiovascular: no chest pain or palpitations, varicose veins bilateral lower extremities  Gastrointestinal: no nausea or vomiting, tolerating diet  Genitourinary: no hematuria or dysuria  Integument/Breast: no rash or pruritis  Hematologic/Lymphatic: no easy bruising or lymphadenopathy  Musculoskeletal: no arthralgias or myalgias, stress fracture of the right ankle  Neurological: no seizures or tremors  Behavioral/Psych: no auditory or visual hallucinations  Endocrine: no heat or cold intolerance, positive for osteoporosis      OBJECTIVE:     PHYSICAL EXAM:     ,                  General: no acute distress and well developed/well nourished  Behavioral/Psych: normal judgment and insight and normal mood/affect  Skin: no rash  Head/Neck: atraumatic, normocephalic, without obvious abnormality  Respiratory: normal respiratory effort  Cardiac: regular rate and rhythm  Vascular: pulses present  Abdomen: soft, non-tender  Musculoskeletal: no joint tenderness, deformity or swelling               ASSESSMENT/PLAN:     Assessment:    Osteoporosis, at high risk for future fractures, history of stress fracture of the ankle fracture.    Plan:   30-45  minutes spent in face-to-face consultation with patient and her family members today, discussing the disease management of osteoporosis, for the reduction of future fractures.  I have explained that bone strength is equal to bone quality. A bone density / DEXA scan is important to complement her care since her fracture was by definition a fragility fracture/traumatic fracture.  Over half of the encounter was spent (50%) counseling the patient on the disease of osteoporosis evidence-based and best practice treatment options available as well as recommendations for improvement of bone quality, the importance of nutritional supplements to include:  Calcium 3697-8275 mg daily in divided doses   Vitamin D3  8184-9773 units daily in divided doses.   Fall prevention and personal safety for the reduction of future fractures.    Clinicians Guidelines for the treatment of Osteoporosis 2023 as part of the National Osteoporosis Foundation were utilized as the evidence-based information provided.    Discussed pharmaceutical treatment options to include Biphosphatases, Denosumab, Abaloparatide and Teriparatide. Information to include indications for therapy, risks and benefits to treatment, and important safety information related to these treatments was provided and discussed.  Handouts were given to the patient for her review on osteoporosis and other pharmacological treatment information related to other available treatment options.    The importance of diet, impact exercise, and core strengthening with gait and balance exercise, through  both formal physical therapy programs and home exercise programs was discussed.     At this point the patient does not wish to pursue aggressive treatment options she will continue with calcium and vitamin-D as prescribed levels follow-up with a repeat DEXA scan approximately 1 year

## 2023-12-03 ENCOUNTER — PATIENT MESSAGE (OUTPATIENT)
Dept: ORTHOPEDICS | Facility: CLINIC | Age: 68
End: 2023-12-03
Payer: MEDICARE

## 2023-12-13 ENCOUNTER — HOSPITAL ENCOUNTER (OUTPATIENT)
Dept: RADIOLOGY | Facility: HOSPITAL | Age: 68
Discharge: HOME | End: 2023-12-13
Attending: STUDENT IN AN ORGANIZED HEALTH CARE EDUCATION/TRAINING PROGRAM
Payer: MEDICARE

## 2023-12-13 VITALS — HEIGHT: 61 IN | BODY MASS INDEX: 26.03 KG/M2

## 2023-12-13 DIAGNOSIS — Z12.31 ENCOUNTER FOR SCREENING MAMMOGRAM FOR MALIGNANT NEOPLASM OF BREAST: ICD-10-CM

## 2023-12-13 PROCEDURE — 77067 SCR MAMMO BI INCL CAD: CPT | Mod: 26,,, | Performed by: RADIOLOGY

## 2023-12-13 PROCEDURE — 77063 BREAST TOMOSYNTHESIS BI: CPT | Mod: 26,,, | Performed by: RADIOLOGY

## 2023-12-13 PROCEDURE — 77067 SCR MAMMO BI INCL CAD: CPT | Mod: TC

## 2023-12-13 PROCEDURE — 77067 MAMMO DIGITAL SCREENING BILAT WITH TOMO: ICD-10-PCS | Mod: 26,,, | Performed by: RADIOLOGY

## 2023-12-13 PROCEDURE — 77063 MAMMO DIGITAL SCREENING BILAT WITH TOMO: ICD-10-PCS | Mod: 26,,, | Performed by: RADIOLOGY

## 2023-12-15 ENCOUNTER — PATIENT MESSAGE (OUTPATIENT)
Dept: ORTHOPEDICS | Facility: CLINIC | Age: 68
End: 2023-12-15
Payer: MEDICARE

## 2023-12-18 ENCOUNTER — PATIENT MESSAGE (OUTPATIENT)
Dept: ORTHOPEDICS | Facility: CLINIC | Age: 68
End: 2023-12-18

## 2023-12-18 ENCOUNTER — OFFICE VISIT (OUTPATIENT)
Dept: ORTHOPEDICS | Facility: CLINIC | Age: 68
End: 2023-12-18
Payer: MEDICARE

## 2023-12-18 ENCOUNTER — TELEPHONE (OUTPATIENT)
Dept: ORTHOPEDICS | Facility: CLINIC | Age: 68
End: 2023-12-18

## 2023-12-18 ENCOUNTER — PATIENT MESSAGE (OUTPATIENT)
Dept: PREADMISSION TESTING | Facility: HOSPITAL | Age: 68
End: 2023-12-18
Payer: MEDICARE

## 2023-12-18 ENCOUNTER — ANESTHESIA EVENT (OUTPATIENT)
Dept: SURGERY | Facility: HOSPITAL | Age: 68
End: 2023-12-18
Payer: MEDICARE

## 2023-12-18 VITALS — WEIGHT: 136 LBS | HEIGHT: 61 IN | BODY MASS INDEX: 25.68 KG/M2

## 2023-12-18 DIAGNOSIS — M20.21 HALLUX RIGIDUS OF RIGHT FOOT: ICD-10-CM

## 2023-12-18 DIAGNOSIS — M19.071 ARTHRITIS OF FIRST METATARSOPHALANGEAL (MTP) JOINT OF RIGHT FOOT: Primary | ICD-10-CM

## 2023-12-18 PROCEDURE — 99999 PR PBB SHADOW E&M-EST. PATIENT-LVL III: CPT | Mod: PBBFAC,,, | Performed by: PHYSICIAN ASSISTANT

## 2023-12-18 PROCEDURE — 99213 OFFICE O/P EST LOW 20 MIN: CPT | Mod: PBBFAC | Performed by: PHYSICIAN ASSISTANT

## 2023-12-18 PROCEDURE — 99499 NO LOS: ICD-10-PCS | Mod: S$PBB,,, | Performed by: PHYSICIAN ASSISTANT

## 2023-12-18 PROCEDURE — 99999 PR PBB SHADOW E&M-EST. PATIENT-LVL III: ICD-10-PCS | Mod: PBBFAC,,, | Performed by: PHYSICIAN ASSISTANT

## 2023-12-18 PROCEDURE — 99499 UNLISTED E&M SERVICE: CPT | Mod: S$PBB,,, | Performed by: PHYSICIAN ASSISTANT

## 2023-12-18 RX ORDER — ACETAMINOPHEN 500 MG
1000 TABLET ORAL 3 TIMES DAILY
Qty: 42 TABLET | Refills: 0 | Status: SHIPPED | OUTPATIENT
Start: 2023-12-18 | End: 2023-12-27

## 2023-12-18 RX ORDER — NAPROXEN 500 MG/1
500 TABLET ORAL 2 TIMES DAILY WITH MEALS
Qty: 28 TABLET | Refills: 0 | Status: SHIPPED | OUTPATIENT
Start: 2023-12-18 | End: 2024-01-03

## 2023-12-18 RX ORDER — GABAPENTIN 300 MG/1
300 CAPSULE ORAL NIGHTLY
Qty: 42 CAPSULE | Refills: 0 | Status: SHIPPED | OUTPATIENT
Start: 2023-12-18 | End: 2024-01-03

## 2023-12-18 RX ORDER — OXYCODONE HYDROCHLORIDE 5 MG/1
5 TABLET ORAL EVERY 6 HOURS PRN
Qty: 15 TABLET | Refills: 0 | Status: SHIPPED | OUTPATIENT
Start: 2023-12-18

## 2023-12-18 NOTE — ANESTHESIA PAT ROS NOTE
12/18/2023  Karlie Carrion is a 68 y.o., female.      Pre-op Assessment    I have reviewed the Patient Summary Reports.       I have reviewed the Medications.     Review of Systems  Anesthesia Hx:  No previous Anesthesia   Neg history of prior surgery.             Social:  Former Smoker, No Alcohol Use Quit smoking cigarettes in 2014, Formerly smoked 1.5 ppd for 25 years, 37.5 total pack-years,         Hematology/Oncology:    Oncology Normal    -- Denies Anemia:               Hematology Comments: H/O Vitamin D deficiency, now WNL                    EENT/Dental:   Allergies          Cardiovascular:  Exercise tolerance: good       Denies CAD.       Denies Angina.     hyperlipidemia  Denies ESPINOSA.  ECG has been reviewed. Varicose veins of both lower extremities, elevated B/P reading                         Pulmonary:   COPD    Denies Shortness of breath.  Denies Recent URI.  Centrilobular emphysema,  Nodule of lower lobe of right lung,  Has not used Albuterol inhaler in over 1 year                 Renal/:  Renal/ Normal  Denies Chronic Renal Disease.                Hepatic/GI:  Hepatic/GI Normal     Denies GERD. Denies Liver Disease.            Musculoskeletal:  Arthritis   Arthritis of first metatarsophalangeal (MTP) joint of right foot,  Hallux rigidus of right foot,  Osteoporosis,  Stress fracture of right ankle, sequela,   Greater trochanteric bursitis of left & right hip,   Bilateral hip pain,  lumbar spondylosis with grade 1 anterolisthesis of L4 on L5.         Spine Disorders: cervical and lumbar Degenerative disease and Disc disease           Neurological:  Neurology Normal   Denies CVA.    Denies Headaches. Denies Seizures.                                Endocrine:   Denies Hypothyroidism.  Prediabetes, HA1C = 5.3 on 12/6/2022        Dermatological:  H/O Oral lesions for 3 weeks with LAD  (lymphadenopathy), seen by ENT/ Otolaryngology   Psych:  Psychiatric Normal                   Past Medical History:   Diagnosis Date    HLD (hyperlipidemia)     Osteoporosis      No past surgical history on file.       Anesthesia Assessment: Preoperative EQUATION    Planned Procedure: Procedure(s) (LRB):  CHEILECTOMY (Right)  Requested Anesthesia Type:Choice  Surgeon: Ian Strickland MD  Service: Orthopedics  Known or anticipated Date of Surgery:12/20/2023    Surgeon notes: reviewed    Electronic QUestionnaire Assessment completed via nurse interview with patient.        Triage considerations:     The patient has no apparent active cardiac condition (No unstable coronary Syndrome such as severe unstable angina or recent [<1 month] myocardial infarction, decompensated CHF, severe valvular   disease or significant arrhythmia)    Previous anesthesia records:None    [facility]   Subspecialty notes: ENT, Ortho    Other important co-morbidities: COPD and HLD       EKG 3/27/2023:  Vent. Rate : 053 BPM     Atrial Rate : 053 BPM      P-R Int : 160 ms          QRS Dur : 086 ms       QT Int : 450 ms       P-R-T Axes : 065 044 036 degrees      QTc Int : 422 ms   Sinus bradycardia with marked sinus arrythmia   Otherwise normal ECG   Confirmed by Katheryn Calderon MD ([id number]) on 3/28/2023 3:26:26 PM                Instructions given. (See in Nurse's note)      Ht: 5'1  Wt: 136 lb  BMI: 25.70

## 2023-12-18 NOTE — TELEPHONE ENCOUNTER
I spoke with pt,confirmed surgery arrival time of 5am.[place] yris A.nothing to eat or drink after midnight.    Patient verbalized understanding.

## 2023-12-18 NOTE — H&P
"Subjective:      Patient ID: Karlie Carrion is a 68 y.o. female.    Chief Complaint: Pain of the Right Foot    Karlie is a 68 y.o. female here today for a pre-operative visit in preparation for a Right foot Cheilectomy to be performed by Dr. Strickland on 23.  She was last seen and treated in the clinic on 23.  There has been no significant change in their past medical or orthopedic status since the previous visit.  No fever, chills, malaise or unexplained weight change.     Past Medical History:   Diagnosis Date    HLD (hyperlipidemia)     Osteoporosis      No past surgical history on file.  Social History     Occupational History    Not on file   Tobacco Use    Smoking status: Former     Current packs/day: 0.00     Average packs/day: 1.5 packs/day for 25.0 years (37.5 ttl pk-yrs)     Types: Cigarettes     Start date:      Quit date:      Years since quittin.9    Smokeless tobacco: Never   Substance and Sexual Activity    Alcohol use: Not Currently    Drug use: Not on file    Sexual activity: Not on file      Review of Systems   Constitutional: Negative for chills and fever.   HENT:  Negative for congestion.    Eyes:  Negative for redness.   Respiratory:  Negative for shortness of breath.    Skin:  Negative for rash.   Gastrointestinal:  Negative for abdominal pain, nausea and vomiting.   Neurological:  Negative for dizziness and seizures.   Psychiatric/Behavioral:  Negative for altered mental status.      Current Outpatient Medications on File Prior to Visit   Medication Sig Dispense Refill    albuterol (VENTOLIN HFA) 90 mcg/actuation inhaler Inhale 2 puffs into the lungs every 6 (six) hours as needed for Wheezing. Rescue 18 g 0     No current facility-administered medications on file prior to visit.     Review of patient's allergies indicates:   Allergen Reactions    Hay fever and allergy relief          Objective:      Vitals:    23 0958   Weight: 61.7 kg (136 lb)   Height: 5' 1" (1.549 " m)     Ortho Exam     Gen: WD, WN in NAD   HEENT: NC/AT   Heart: RR   Resp: unlabored breathing   Skin: intact, no lesions pertinent to the surgical site   Assessment:       1. Arthritis of first metatarsophalangeal (MTP) joint of right foot    2. Hallux rigidus of right foot          Plan:       Surgery per Dr. Strickland

## 2023-12-18 NOTE — PROGRESS NOTES
Karlie is a 68 y.o. female here today for a pre-operative visit in preparation for a Right foot Cheilectomy to be performed by Dr. Strickland on 12/20/23.  She was last seen and treated in the clinic on 11/30/23.  There has been no significant change in their past medical or orthopedic status since the previous visit.  No fever, chills, malaise or unexplained weight change.     Allergies, medications, past medical history and past surgical history were reviewed with the patient.     Physical examination was performed.     Current care, treatment plan, precautions, activity level/ modifications, limitations, rehabilitation exercises and proposed future treatment were discussed with the patient. We discussed the need to monitor for changes in symptoms and condition and report them to the physician.    Consents were signed.   Patient has crutches and will bring with them the day of surgery. They have been counseled on proper use of the assistive device.   Post operative pain medications sent to [facility]    -Discussed COVID19 with the patient, they are aware of our current policies and procedures, were given the option of delaying surgery, and they elect to proceed.        Pre, joan, and post operative procedure and expectations were discussed.  Questions were answered. The patient has been educated and is ready to proceed with surgery.  Approximately 30 minutes was spent discussing surgical outcomes, plans, procedures, pre, joan, and post operative expectations and care. The risks, benefits and alternatives to surgery were discussed with the patient at great length.  These include bleeding, infection, vessel/nerve damage, pain, numbness, tingling, complex regional pain syndrome, hardware/surgical failure, need for further surgery, malunion, nonunion, DVT, PE, arthritis and death. He also understands that the risks of surgery may be greater for some patients due to health or lifestyle issues, such as a current condition or a  history of heart disease, obesity, clotting disorders, recurrent infections, smoking, sedentary lifestyle, or noncompliance with medications, therapy, or followup. The degree of the increased risk is hard to estimate with any degree of precision.  Patient states an understanding and wishes to proceed with surgery.   All questions were answered.  No guarantees were implied or stated.  Informed consent was obtained  The patient will contact us if the have any questions, concerns, and changes in their medical condition prior to surgery.

## 2023-12-20 ENCOUNTER — PATIENT MESSAGE (OUTPATIENT)
Dept: SURGERY | Facility: HOSPITAL | Age: 68
End: 2023-12-20
Payer: MEDICARE

## 2023-12-20 ENCOUNTER — TELEPHONE (OUTPATIENT)
Dept: ORTHOPEDICS | Facility: CLINIC | Age: 68
End: 2023-12-20
Payer: MEDICARE

## 2023-12-20 ENCOUNTER — HOSPITAL ENCOUNTER (OUTPATIENT)
Facility: HOSPITAL | Age: 68
Discharge: HOME | End: 2023-12-20
Attending: ORTHOPAEDIC SURGERY | Admitting: ORTHOPAEDIC SURGERY
Payer: MEDICARE

## 2023-12-20 ENCOUNTER — ANESTHESIA (OUTPATIENT)
Dept: SURGERY | Facility: HOSPITAL | Age: 68
End: 2023-12-20
Payer: MEDICARE

## 2023-12-20 VITALS
RESPIRATION RATE: 30 BRPM | SYSTOLIC BLOOD PRESSURE: 147 MMHG | TEMPERATURE: 98 F | OXYGEN SATURATION: 97 % | DIASTOLIC BLOOD PRESSURE: 70 MMHG | HEIGHT: 61 IN | BODY MASS INDEX: 25.68 KG/M2 | WEIGHT: 136 LBS | HEART RATE: 54 BPM

## 2023-12-20 DIAGNOSIS — M20.21 HALLUX RIGIDUS OF RIGHT FOOT: ICD-10-CM

## 2023-12-20 DIAGNOSIS — M19.071 ARTHRITIS OF FIRST METATARSOPHALANGEAL (MTP) JOINT OF RIGHT FOOT: ICD-10-CM

## 2023-12-20 PROCEDURE — 25000003 PHARM REV CODE 250: Performed by: NURSE ANESTHETIST, CERTIFIED REGISTERED

## 2023-12-20 PROCEDURE — 27201423 OPTIME MED/SURG SUP & DEVICES STERILE SUPPLY: Performed by: ORTHOPAEDIC SURGERY

## 2023-12-20 PROCEDURE — D9220A PRA ANESTHESIA: ICD-10-PCS | Mod: ANES,,, | Performed by: STUDENT IN AN ORGANIZED HEALTH CARE EDUCATION/TRAINING PROGRAM

## 2023-12-20 PROCEDURE — D9220A PRA ANESTHESIA: Mod: ANES,,, | Performed by: STUDENT IN AN ORGANIZED HEALTH CARE EDUCATION/TRAINING PROGRAM

## 2023-12-20 PROCEDURE — D9220A PRA ANESTHESIA: Mod: CRNA,,, | Performed by: NURSE ANESTHETIST, CERTIFIED REGISTERED

## 2023-12-20 PROCEDURE — 28289 CORRJ HALUX RIGDUS W/O IMPLT: CPT | Mod: RT,GC,, | Performed by: ORTHOPAEDIC SURGERY

## 2023-12-20 PROCEDURE — 64447 NJX AA&/STRD FEMORAL NRV IMG: CPT | Mod: 59,RT | Performed by: STUDENT IN AN ORGANIZED HEALTH CARE EDUCATION/TRAINING PROGRAM

## 2023-12-20 PROCEDURE — D9220A PRA ANESTHESIA: ICD-10-PCS | Mod: CRNA,,, | Performed by: NURSE ANESTHETIST, CERTIFIED REGISTERED

## 2023-12-20 PROCEDURE — 63600175 PHARM REV CODE 636 W HCPCS: Performed by: NURSE ANESTHETIST, CERTIFIED REGISTERED

## 2023-12-20 PROCEDURE — 99900035 HC TECH TIME PER 15 MIN (STAT)

## 2023-12-20 PROCEDURE — 94761 N-INVAS EAR/PLS OXIMETRY MLT: CPT

## 2023-12-20 PROCEDURE — 64450 POPLITEAL SINGLE SHOT: ICD-10-PCS | Mod: 59,RT,, | Performed by: STUDENT IN AN ORGANIZED HEALTH CARE EDUCATION/TRAINING PROGRAM

## 2023-12-20 PROCEDURE — 71000015 HC POSTOP RECOV 1ST HR: Performed by: ORTHOPAEDIC SURGERY

## 2023-12-20 PROCEDURE — 28289 PR REPAIR HALLUX RIGIDUS; W/O IMPLANT: ICD-10-PCS | Mod: RT,GC,, | Performed by: ORTHOPAEDIC SURGERY

## 2023-12-20 PROCEDURE — 37000009 HC ANESTHESIA EA ADD 15 MINS: Performed by: ORTHOPAEDIC SURGERY

## 2023-12-20 PROCEDURE — 37000008 HC ANESTHESIA 1ST 15 MINUTES: Performed by: ORTHOPAEDIC SURGERY

## 2023-12-20 PROCEDURE — 63600175 PHARM REV CODE 636 W HCPCS: Performed by: STUDENT IN AN ORGANIZED HEALTH CARE EDUCATION/TRAINING PROGRAM

## 2023-12-20 PROCEDURE — 64450 NJX AA&/STRD OTHER PN/BRANCH: CPT | Mod: 59,RT,, | Performed by: STUDENT IN AN ORGANIZED HEALTH CARE EDUCATION/TRAINING PROGRAM

## 2023-12-20 PROCEDURE — 71000033 HC RECOVERY, INTIAL HOUR: Performed by: ORTHOPAEDIC SURGERY

## 2023-12-20 PROCEDURE — 64447 ADDUCTOR CANAL SINGLE SHOT: ICD-10-PCS | Mod: 59,RT,, | Performed by: STUDENT IN AN ORGANIZED HEALTH CARE EDUCATION/TRAINING PROGRAM

## 2023-12-20 PROCEDURE — 36000707: Performed by: ORTHOPAEDIC SURGERY

## 2023-12-20 PROCEDURE — 25000003 PHARM REV CODE 250: Performed by: PHYSICIAN ASSISTANT

## 2023-12-20 PROCEDURE — 63600175 PHARM REV CODE 636 W HCPCS: Performed by: PHYSICIAN ASSISTANT

## 2023-12-20 PROCEDURE — 64445 NJX AA&/STRD SCIATIC NRV IMG: CPT | Mod: 59,RT | Performed by: STUDENT IN AN ORGANIZED HEALTH CARE EDUCATION/TRAINING PROGRAM

## 2023-12-20 PROCEDURE — 36000706: Performed by: ORTHOPAEDIC SURGERY

## 2023-12-20 RX ORDER — FENTANYL CITRATE 50 UG/ML
100 INJECTION, SOLUTION INTRAMUSCULAR; INTRAVENOUS
Status: DISCONTINUED | OUTPATIENT
Start: 2023-12-20 | End: 2023-12-20 | Stop reason: HOSPADM

## 2023-12-20 RX ORDER — HALOPERIDOL 5 MG/ML
0.5 INJECTION INTRAMUSCULAR EVERY 10 MIN PRN
Status: DISCONTINUED | OUTPATIENT
Start: 2023-12-20 | End: 2023-12-20 | Stop reason: HOSPADM

## 2023-12-20 RX ORDER — IBUPROFEN 200 MG
200 TABLET ORAL
COMMUNITY

## 2023-12-20 RX ORDER — OXYCODONE HYDROCHLORIDE 5 MG/1
5 TABLET ORAL
Status: DISCONTINUED | OUTPATIENT
Start: 2023-12-20 | End: 2023-12-20 | Stop reason: HOSPADM

## 2023-12-20 RX ORDER — PROPOFOL 10 MG/ML
VIAL (ML) INTRAVENOUS CONTINUOUS PRN
Status: DISCONTINUED | OUTPATIENT
Start: 2023-12-20 | End: 2023-12-20

## 2023-12-20 RX ORDER — ONDANSETRON 2 MG/ML
4 INJECTION INTRAMUSCULAR; INTRAVENOUS DAILY PRN
Status: DISCONTINUED | OUTPATIENT
Start: 2023-12-20 | End: 2023-12-20 | Stop reason: HOSPADM

## 2023-12-20 RX ORDER — ROPIVACAINE HYDROCHLORIDE 5 MG/ML
INJECTION, SOLUTION EPIDURAL; INFILTRATION; PERINEURAL
Status: COMPLETED | OUTPATIENT
Start: 2023-12-20 | End: 2023-12-20

## 2023-12-20 RX ORDER — HYDROMORPHONE HYDROCHLORIDE 1 MG/ML
0.2 INJECTION, SOLUTION INTRAMUSCULAR; INTRAVENOUS; SUBCUTANEOUS EVERY 5 MIN PRN
Status: DISCONTINUED | OUTPATIENT
Start: 2023-12-20 | End: 2023-12-20 | Stop reason: HOSPADM

## 2023-12-20 RX ORDER — FENTANYL CITRATE 50 UG/ML
25 INJECTION, SOLUTION INTRAMUSCULAR; INTRAVENOUS EVERY 5 MIN PRN
Status: DISCONTINUED | OUTPATIENT
Start: 2023-12-20 | End: 2023-12-20 | Stop reason: HOSPADM

## 2023-12-20 RX ORDER — LIDOCAINE HYDROCHLORIDE 10 MG/ML
INJECTION, SOLUTION INTRAVENOUS
Status: DISCONTINUED | OUTPATIENT
Start: 2023-12-20 | End: 2023-12-20

## 2023-12-20 RX ORDER — SODIUM CHLORIDE 0.9 % (FLUSH) 0.9 %
10 SYRINGE (ML) INJECTION
Status: DISCONTINUED | OUTPATIENT
Start: 2023-12-20 | End: 2023-12-20 | Stop reason: HOSPADM

## 2023-12-20 RX ORDER — CEFAZOLIN SODIUM 1 G/3ML
INJECTION, POWDER, FOR SOLUTION INTRAMUSCULAR; INTRAVENOUS
Status: DISCONTINUED | OUTPATIENT
Start: 2023-12-20 | End: 2023-12-20

## 2023-12-20 RX ORDER — MUPIROCIN 20 MG/G
OINTMENT TOPICAL 2 TIMES DAILY
Status: DISCONTINUED | OUTPATIENT
Start: 2023-12-20 | End: 2023-12-20 | Stop reason: HOSPADM

## 2023-12-20 RX ORDER — ONDANSETRON 2 MG/ML
INJECTION INTRAMUSCULAR; INTRAVENOUS
Status: DISCONTINUED | OUTPATIENT
Start: 2023-12-20 | End: 2023-12-20

## 2023-12-20 RX ORDER — MIDAZOLAM HYDROCHLORIDE 1 MG/ML
INJECTION INTRAMUSCULAR; INTRAVENOUS
Status: DISCONTINUED | OUTPATIENT
Start: 2023-12-20 | End: 2023-12-20

## 2023-12-20 RX ORDER — DEXAMETHASONE SODIUM PHOSPHATE 4 MG/ML
INJECTION, SOLUTION INTRA-ARTICULAR; INTRALESIONAL; INTRAMUSCULAR; INTRAVENOUS; SOFT TISSUE
Status: DISCONTINUED | OUTPATIENT
Start: 2023-12-20 | End: 2023-12-20

## 2023-12-20 RX ORDER — ACETAMINOPHEN 500 MG
1000 TABLET ORAL
Status: COMPLETED | OUTPATIENT
Start: 2023-12-20 | End: 2023-12-20

## 2023-12-20 RX ORDER — DEXMEDETOMIDINE HYDROCHLORIDE 100 UG/ML
INJECTION, SOLUTION INTRAVENOUS
Status: DISCONTINUED | OUTPATIENT
Start: 2023-12-20 | End: 2023-12-20

## 2023-12-20 RX ORDER — KETAMINE HCL IN 0.9 % NACL 50 MG/5 ML
SYRINGE (ML) INTRAVENOUS
Status: DISCONTINUED | OUTPATIENT
Start: 2023-12-20 | End: 2023-12-20

## 2023-12-20 RX ORDER — CELECOXIB 200 MG/1
400 CAPSULE ORAL
Status: COMPLETED | OUTPATIENT
Start: 2023-12-20 | End: 2023-12-20

## 2023-12-20 RX ORDER — MIDAZOLAM HYDROCHLORIDE 1 MG/ML
1 INJECTION INTRAMUSCULAR; INTRAVENOUS
Status: DISCONTINUED | OUTPATIENT
Start: 2023-12-20 | End: 2023-12-20 | Stop reason: HOSPADM

## 2023-12-20 RX ADMIN — Medication 30 MG: at 07:12

## 2023-12-20 RX ADMIN — ONDANSETRON 4 MG: 2 INJECTION INTRAMUSCULAR; INTRAVENOUS at 07:12

## 2023-12-20 RX ADMIN — ACETAMINOPHEN 1000 MG: 500 TABLET ORAL at 06:12

## 2023-12-20 RX ADMIN — ROPIVACAINE HYDROCHLORIDE 30 ML: 5 INJECTION EPIDURAL; INFILTRATION; PERINEURAL at 06:12

## 2023-12-20 RX ADMIN — MIDAZOLAM HYDROCHLORIDE 2 MG: 1 INJECTION, SOLUTION INTRAMUSCULAR; INTRAVENOUS at 06:12

## 2023-12-20 RX ADMIN — DEXMEDETOMIDINE 12 MCG: 100 INJECTION, SOLUTION, CONCENTRATE INTRAVENOUS at 07:12

## 2023-12-20 RX ADMIN — SODIUM CHLORIDE: 9 INJECTION, SOLUTION INTRAVENOUS at 06:12

## 2023-12-20 RX ADMIN — CELECOXIB 400 MG: 200 CAPSULE ORAL at 06:12

## 2023-12-20 RX ADMIN — FENTANYL CITRATE 50 MCG: 50 INJECTION INTRAMUSCULAR; INTRAVENOUS at 06:12

## 2023-12-20 RX ADMIN — PROPOFOL 125 MCG/KG/MIN: 10 INJECTION, EMULSION INTRAVENOUS at 07:12

## 2023-12-20 RX ADMIN — CEFAZOLIN 2 G: 330 INJECTION, POWDER, FOR SOLUTION INTRAMUSCULAR; INTRAVENOUS at 07:12

## 2023-12-20 RX ADMIN — LIDOCAINE HYDROCHLORIDE 50 MG: 10 INJECTION, SOLUTION INTRAVENOUS at 07:12

## 2023-12-20 RX ADMIN — DEXAMETHASONE SODIUM PHOSPHATE 4 MG: 4 INJECTION, SOLUTION INTRAMUSCULAR; INTRAVENOUS at 07:12

## 2023-12-20 RX ADMIN — ROPIVACAINE HYDROCHLORIDE 7.5 ML: 5 INJECTION, SOLUTION EPIDURAL; INFILTRATION; PERINEURAL at 06:12

## 2023-12-20 NOTE — ANESTHESIA PROCEDURE NOTES
Popliteal single shot    Patient location during procedure: pre-op   Block not for primary anesthetic.  Reason for block: at surgeon's request and post-op pain management   Post-op Pain Location: right foot   Start time: 12/20/2023 6:41 AM  Timeout: 12/20/2023 6:40 AM   End time: 12/20/2023 6:45 AM    Staffing  Authorizing Provider: Fadumo Guerrero MD  Performing Provider: Fadumo Guerrero MD    Staffing  Performed by: Fadumo Guerrero MD  Authorized by: Fadumo Guerrero MD    Preanesthetic Checklist  Completed: patient identified, IV checked, site marked, risks and benefits discussed, surgical consent, monitors and equipment checked, pre-op evaluation and timeout performed  Peripheral Block  Patient position: supine  Prep: ChloraPrep  Patient monitoring: heart rate, cardiac monitor, continuous pulse ox, continuous capnometry and frequent blood pressure checks  Block type: popliteal  Laterality: right  Injection technique: single shot  Needle  Needle type: Stimuplex   Needle gauge: 21 G  Needle length: 4 in  Needle localization: anatomical landmarks and ultrasound guidance   -ultrasound image captured on disc.  Assessment  Injection assessment: negative aspiration, negative parasthesia and local visualized surrounding nerve  Paresthesia pain: none  Heart rate change: no  Slow fractionated injection: yes  Pain Tolerance: comfortable throughout block and no complaints  Medications:    Medications: ropivacaine (NAROPIN) injection 0.5% - Perineural   30 mL - 12/20/2023 6:42:00 AM    Additional Notes  VSS.  [facility] RN monitoring vitals throughout procedure.  Patient tolerated procedure well.

## 2023-12-20 NOTE — ANESTHESIA POSTPROCEDURE EVALUATION
Anesthesia Post Evaluation    Patient: Karlie Carrion    Procedure(s) Performed: Procedure(s) (LRB):  CHEILECTOMY (Right)    Final Anesthesia Type: general      Patient location during evaluation: PACU  Patient participation: Yes- Able to Participate  Level of consciousness: awake and alert  Post-procedure vital signs: reviewed and stable  Pain management: adequate  Airway patency: patent  JEFERSON mitigation strategies: Multimodal analgesia  PONV status at discharge: No PONV  Anesthetic complications: no      Cardiovascular status: blood pressure returned to baseline and hemodynamically stable  Respiratory status: unassisted  Hydration status: euvolemic  Follow-up not needed.              Vitals Value Taken Time   /70 12/20/23 0832   Temp 36.5 °C (97.7 °F) 12/20/23 0755   Pulse 49 12/20/23 0838   Resp 33 12/20/23 0838   SpO2 97 % 12/20/23 0838   Vitals shown include unvalidated device data.      No case tracking events are documented in the log.      Pain/Michele Score: Pain Rating Prior to Med Admin: 0 (12/20/2023  6:45 AM)  Michele Score: 10 (12/20/2023  8:15 AM)

## 2023-12-20 NOTE — TRANSFER OF CARE
"Anesthesia Transfer of Care Note    Patient: Karlie Carrion    Procedure(s) Performed: Procedure(s) (LRB):  CHEILECTOMY (Right)    Patient location: [facility]    Anesthesia Type: general and regional    Transport from OR: Transported from OR on room air with adequate spontaneous ventilation. Transported from OR on 6-10 L/min O2 by face mask with adequate spontaneous ventilation    Post pain: adequate analgesia    Post assessment: no apparent anesthetic complications and tolerated procedure well    Post vital signs: stable    Level of consciousness: sedated    Nausea/Vomiting: no nausea/vomiting    Complications: none    Transfer of care protocol was followed      Last vitals: Visit Vitals  BP (!) 152/83 (BP Location: Right arm, Patient Position: Lying)   Pulse (!) 57   Temp 36.4 °C (97.6 °F) (Tympanic)   Resp 20   Ht 5' 1" (1.549 m)   Wt 61.7 kg (136 lb)   SpO2 99%   Breastfeeding No   BMI 25.70 kg/m²     "

## 2023-12-20 NOTE — ANESTHESIA PROCEDURE NOTES
Adductor canal single shot    Patient location during procedure: pre-op   Block not for primary anesthetic.  Reason for block: at surgeon's request and post-op pain management   Post-op Pain Location: right foot   Start time: 12/20/2023 6:41 AM  Timeout: 12/20/2023 6:40 AM   End time: 12/20/2023 6:45 AM    Staffing  Authorizing Provider: Fadumo Guerrero MD  Performing Provider: Fadumo Guerrero MD    Staffing  Performed by: Fadumo Guerrero MD  Authorized by: Fadumo Guerrero MD    Preanesthetic Checklist  Completed: patient identified, IV checked, site marked, risks and benefits discussed, surgical consent, monitors and equipment checked, pre-op evaluation and timeout performed  Peripheral Block  Patient position: supine  Prep: ChloraPrep  Patient monitoring: heart rate, cardiac monitor, continuous pulse ox, continuous capnometry and frequent blood pressure checks  Block type: adductor canal  Laterality: right  Injection technique: single shot  Needle  Needle type: Stimuplex   Needle gauge: 21 G  Needle length: 4 in  Needle localization: anatomical landmarks and ultrasound guidance   -ultrasound image captured on disc.  Assessment  Injection assessment: negative aspiration, negative parasthesia and local visualized surrounding nerve  Paresthesia pain: none  Heart rate change: no  Slow fractionated injection: yes  Pain Tolerance: comfortable throughout block and no complaints  Medications:    Medications: ropivacaine (NAROPIN) injection 0.5% - Perineural   7.5 mL - 12/20/2023 6:44:00 AM    Additional Notes  VSS.  [facility] RN monitoring vitals throughout procedure.  Patient tolerated procedure well.

## 2023-12-20 NOTE — BRIEF OP NOTE
[facility] - Surgery ([facility])  Brief Operative Note    Surgery Date: 12/20/2023     Surgeon(s) and Role:     * Ian Strickland MD - Primary    Assisting Surgeon: None    Pre-op Diagnosis:  Arthritis of first metatarsophalangeal (MTP) joint of right foot [M19.071]  Hallux rigidus of right foot [M20.21]    Post-op Diagnosis:  Post-Op Diagnosis Codes:     * Arthritis of first metatarsophalangeal (MTP) joint of right foot [M19.071]     * Hallux rigidus of right foot [M20.21]    Procedure(s) (LRB):  CHEILECTOMY (Right)    Anesthesia: Choice    Operative Findings: see op note    Estimated Blood Loss: * No values recorded between 12/20/2023  7:16 AM and 12/20/2023  7:53 AM *         Specimens:   Specimen (24h ago, onward)      None              Discharge Note    OUTCOME: Patient tolerated treatment/procedure well without complication and is now ready for discharge.    DISPOSITION: Home or Self Care    FINAL DIAGNOSIS:  <principal problem not specified>    FOLLOWUP: In clinic    DISCHARGE INSTRUCTIONS:  No discharge procedures on file.

## 2023-12-20 NOTE — TELEPHONE ENCOUNTER
I spoke with pt,notified the patient that she will need to visit the nearest Emergency Room because  and his PA Anu Mora is gone for the day. Pt,verbalized understanding.           ----- Message from Whitney Newman sent at 12/20/2023  2:35 PM CST -----  Regarding: Surgery Concern  Contact: Pt  [phone]  Pt is calling to state she had surgery this morning and the hand where IV was has a lump the size of a egg staes lump is solid please call

## 2023-12-20 NOTE — OP NOTE
Operative report  Date of surgery:  12/20/2023     Preop diagnosis:  Right hallux rigidus    Postop diagnosis:  Same     Procedure:  Cheilectomy right big toe     Anesthesia: Regional block     Surgeon:  Dr. Strickland  Assistant:  Dr. Dia    Complications:  None   Estimated blood loss:  None  Specimens: None    Procedure in detail:   After regional anesthesia was administered in the preop holding area, the patient was brought to the operating placed on the operating table in a supine position.  The patient's right leg was prepped and draped in a sterile fashion.  The right leg was exsanguinated with an Esmarch bandage and this was left around the ankle as a tourniquet.  A dorsal longitudinal incision was made centered over the right big toe MTP joint.  The incision was carried through skin and subcutaneous tissue.  The extensor hallucis longus tendon was retracted laterally in the incision was carried sharply down to the bone of the 1st metatarsal extending distally through the joint capsule across the 1st MTP joint and onto the dorsum of the proximal phalanx.  The 1st MTP joint was exposed.  As evident on x-ray, there was very little cartilage coverage of the 1st metatarsal head in the articular surface of the proximal phalanx.  With Hohmann retractors in place, a microsagittal saw was used to remove the dorsal medial and lateral osteophytes on the 1st metatarsal head.  The dorsal osteophyte formation of the base of the proximal phalanx was removed with a rongeur.  A reciprocating rasp was used to smooth down all of the rough surfaces.  The joint was then well irrigated.  The joint capsule was closed with 3-0 Vicryl suture.  The skin incision was closed with 3-0 Vicryl suture.  The skin incision was closed with 3-0 nylon.  A sterile compressive dressing was placed and the patient was returned to the postop holding area in stable condition.

## 2023-12-20 NOTE — ADDENDUM NOTE
Addendum  created 12/20/23 0937 by Angela James CRNA    Intraprocedure Event edited, Intraprocedure Staff edited

## 2023-12-20 NOTE — PLAN OF CARE
Nursing pre-op complete. Pt calm, will continue to monitor. Call light within reach. Pt's  Malachi visiting at bedside, states will keep pt's belongings. Crutches and cane in car. Anesthesia consent and site norma pending.

## 2023-12-20 NOTE — ANESTHESIA PREPROCEDURE EVALUATION
"                                                                                                             2023  Pre-operative evaluation for Procedure(s) (LRB):  CHEILECTOMY (Right)    Karlie Carrion is a 68 y.o. female     Patient Active Problem List   Diagnosis    Former smoker    Varicose veins of both lower extremities    Prediabetes    Stress fracture of right ankle    Photoaged skin    Positive colorectal cancer screening using Cologuard test    Osteoporosis    Hyperlipidemia    History of coccidioidomycosis    Centrilobular emphysema    Elevated blood pressure reading    Nodule of lower lobe of right lung    Other lesions of oral mucosa       Review of patient's allergies indicates:  No Active Allergies    No current facility-administered medications on file prior to encounter.     Current Outpatient Medications on File Prior to Encounter   Medication Sig Dispense Refill    albuterol (VENTOLIN HFA) 90 mcg/actuation inhaler Inhale 2 puffs into the lungs every 6 (six) hours as needed for Wheezing. Rescue 18 g 0    ibuprofen (ADVIL,MOTRIN) 200 MG tablet Take 200 mg by mouth as needed for Pain.         Past Surgical History:   Procedure Laterality Date    KNEE ARTHROSCOPY Bilateral        Social History     Socioeconomic History    Marital status:    Tobacco Use    Smoking status: Former     Current packs/day: 0.00     Average packs/day: 1.5 packs/day for 25.0 years (37.5 ttl pk-yrs)     Types: Cigarettes     Start date:      Quit date:      Years since quittin.9    Smokeless tobacco: Never   Substance and Sexual Activity    Alcohol use: Not Currently    Drug use: Yes     Types: Marijuana     Comment: daily use, yesterday last use         CBC: No results for input(s): "WBC", "RBC", "HGB", "HCT", "PLT", "MCV", "MCH", "MCHC" in the last 72 hours.    CMP: No results for input(s): "NA", "K", "CL", "CO2", "BUN", "CREATININE", "GLU", "MG", "PHOS", "CALCIUM", "ALBUMIN", "PROT", "ALKPHOS", "ALT", " ""AST", "BILITOT" in the last 72 hours.    INR  No results for input(s): "PT", "INR", "PROTIME", "APTT" in the last 72 hours.        Diagnostic Studies:      EKD Echo:  No results found for this or any previous visit.       Pre-op Assessment    I have reviewed the Patient Summary Reports.     I have reviewed the Nursing Notes. I have reviewed the NPO Status.   I have reviewed the Medications.     Review of Systems      Physical Exam  General: Well nourished and Cooperative    Airway:  Mallampati: II   Mouth Opening: Normal  TM Distance: Normal  Tongue: Normal  Neck ROM: Normal ROM    Chest/Lungs:  Clear to auscultation, Normal Respiratory Rate    Heart:  Rate: Normal  Rhythm: Regular Rhythm  Sounds: Normal        Anesthesia Plan  Type of Anesthesia, risks & benefits discussed:    Anesthesia Type: Gen ETT, Gen Natural Airway, Gen Supraglottic Airway  Intra-op Monitoring Plan: Standard ASA Monitors  Post Op Pain Control Plan: multimodal analgesia and IV/PO Opioids PRN  Induction:  IV  Airway Plan: Direct and Video, Post-Induction  Informed Consent: Informed consent signed with the Patient and all parties understand the risks and agree with anesthesia plan.  All questions answered.   ASA Score: 2    Ready For Surgery From Anesthesia Perspective.     .      "

## 2023-12-20 NOTE — PLAN OF CARE
VSS. Patient able to tolerate oral liquids. Patient denies c/o pain. Patient/family received home medication per bedside delivery. Dressing intact. No distress noted. Patient states she is ready for discharge. Discharge instructions reviewed with patient and family, verbalized understanding. IV discontinued with catheter tip intact. Family at bedside to help patient dress. Patient wheeled to lobby via staff.

## 2023-12-21 ENCOUNTER — PATIENT MESSAGE (OUTPATIENT)
Dept: ORTHOPEDICS | Facility: CLINIC | Age: 68
End: 2023-12-21
Payer: MEDICARE

## 2023-12-21 ENCOUNTER — PATIENT MESSAGE (OUTPATIENT)
Dept: PRIMARY CARE CLINIC | Facility: CLINIC | Age: 68
End: 2023-12-21
Payer: MEDICARE

## 2023-12-26 ENCOUNTER — PATIENT MESSAGE (OUTPATIENT)
Dept: ORTHOPEDICS | Facility: CLINIC | Age: 68
End: 2023-12-26
Payer: MEDICARE

## 2024-01-05 ENCOUNTER — OFFICE VISIT (OUTPATIENT)
Dept: SPORTS MEDICINE | Facility: CLINIC | Age: 69
End: 2024-01-05
Payer: MEDICARE

## 2024-01-05 VITALS
SYSTOLIC BLOOD PRESSURE: 138 MMHG | WEIGHT: 135 LBS | HEIGHT: 61 IN | HEART RATE: 65 BPM | DIASTOLIC BLOOD PRESSURE: 78 MMHG | BODY MASS INDEX: 25.49 KG/M2

## 2024-01-05 DIAGNOSIS — M99.05 SOMATIC DYSFUNCTION OF PELVIC REGION: ICD-10-CM

## 2024-01-05 DIAGNOSIS — M99.04 SACRAL REGION SOMATIC DYSFUNCTION: ICD-10-CM

## 2024-01-05 DIAGNOSIS — M99.08 SOMATIC DYSFUNCTION OF RIB CAGE REGION: ICD-10-CM

## 2024-01-05 DIAGNOSIS — M25.551 POSTERIOR PAIN OF RIGHT HIP: Primary | ICD-10-CM

## 2024-01-05 DIAGNOSIS — M79.10 MYALGIA: ICD-10-CM

## 2024-01-05 DIAGNOSIS — M25.552 POSTERIOR PAIN OF HIP, LEFT: ICD-10-CM

## 2024-01-05 DIAGNOSIS — M99.06 SOMATIC DYSFUNCTION OF LOWER EXTREMITY: ICD-10-CM

## 2024-01-05 DIAGNOSIS — M99.02 SOMATIC DYSFUNCTION OF THORACIC REGION: ICD-10-CM

## 2024-01-05 DIAGNOSIS — M47.816 LUMBAR SPONDYLOSIS: ICD-10-CM

## 2024-01-05 DIAGNOSIS — M99.03 SOMATIC DYSFUNCTION OF LUMBAR REGION: ICD-10-CM

## 2024-01-05 PROCEDURE — 99214 OFFICE O/P EST MOD 30 MIN: CPT | Mod: 25,S$PBB,, | Performed by: NEUROMUSCULOSKELETAL MEDICINE & OMM

## 2024-01-05 PROCEDURE — 98927 OSTEOPATH MANJ 5-6 REGIONS: CPT | Mod: PBBFAC,27 | Performed by: NEUROMUSCULOSKELETAL MEDICINE & OMM

## 2024-01-05 PROCEDURE — 99999 PR PBB SHADOW E&M-EST. PATIENT-LVL III: CPT | Mod: PBBFAC,,, | Performed by: NEUROMUSCULOSKELETAL MEDICINE & OMM

## 2024-01-05 PROCEDURE — 99213 OFFICE O/P EST LOW 20 MIN: CPT | Mod: PBBFAC,25 | Performed by: NEUROMUSCULOSKELETAL MEDICINE & OMM

## 2024-01-05 PROCEDURE — 98927 OSTEOPATH MANJ 5-6 REGIONS: CPT | Mod: S$PBB,,, | Performed by: NEUROMUSCULOSKELETAL MEDICINE & OMM

## 2024-01-05 PROCEDURE — 97110 THERAPEUTIC EXERCISES: CPT | Mod: GP,,, | Performed by: NEUROMUSCULOSKELETAL MEDICINE & OMM

## 2024-01-05 RX ORDER — SPIRONOLACTONE 25 MG
TABLET ORAL ONCE
COMMUNITY

## 2024-01-05 NOTE — PROGRESS NOTES
"Subjective:     Karlie Carrion     Chief Complaint   Patient presents with    Left Hip - Pain    Right Hip - Pain     HPI    Consuelo is a 68 y.o. female coming in today for bilateral hip pain that began "a few" year(s) ago, referred by Dr. Sánchez. Pt. describes the pain as a 0/10 currently, 5/10 at worst achy pain that does not radiate. Pt localizes the pain to her posterior hips. There was not a fall/injury/ or trauma associated with the onset of symptoms. The pain is better with rest, occasional NSAID use and worse with walking more than 1-2 miles. Pt has been attending [facility] for her hips and right knee with good relief. Notes they are working on gait retraining related to her knee and foot problems. Pt. Denies any other musculoskeletal complaints at this time. Of note, pt recently had right foot chielectomy with Dr. Strickland and is in a boot.     Joint instability? no  Mechanical locking/clicking? no  Affecting ADL's? yes  Affecting sleep? no    Occupation: retired    Review of Systems   Constitutional:  Negative for chills and fever.   Musculoskeletal:  Positive for joint pain and myalgias. Negative for back pain, falls and neck pain.   Neurological:  Negative for dizziness, tingling, focal weakness, weakness and headaches.     PAST MEDICAL HISTORY:   Past Medical History:   Diagnosis Date    HLD (hyperlipidemia)     Osteoporosis      PAST SURGICAL HISTORY:   Past Surgical History:   Procedure Laterality Date    CHEILECTOMY Right 12/20/2023    Procedure: CHEILECTOMY;  Surgeon: Ian Strickland MD;  Location: [facility];  Service: Orthopedics;  Laterality: Right;    KNEE ARTHROSCOPY Bilateral      FAMILY HISTORY:   Family History   Problem Relation Age of Onset    Stroke Mother     Ovarian cancer Other         cousin-paternal     SOCIAL HISTORY:   Social History     Socioeconomic History    Marital status:    Tobacco Use    Smoking status: Former     Current packs/day: 0.00     Average packs/day: 1.5 packs/day " for 25.0 years (37.5 ttl pk-yrs)     Types: Cigarettes     Start date: 1989     Quit date: 2014     Years since quitting: 10.0    Smokeless tobacco: Never   Substance and Sexual Activity    Alcohol use: Not Currently    Drug use: Yes     Types: Marijuana     Comment: daily use, yesterday last use     Social Determinants of Health     Financial Resource Strain: Patient Declined (1/5/2024)    Overall Financial Resource Strain (CARDIA)     Difficulty of Paying Living Expenses: Patient declined   Food Insecurity: No Food Insecurity (1/5/2024)    Hunger Vital Sign     Worried About Running Out of Food in the Last Year: Never true     Ran Out of Food in the Last Year: Never true   Transportation Needs: No Transportation Needs (1/5/2024)    PRAPARE - Transportation     Lack of Transportation (Medical): No     Lack of Transportation (Non-Medical): No   Physical Activity: Sufficiently Active (1/5/2024)    Exercise Vital Sign     Days of Exercise per Week: 3 days     Minutes of Exercise per Session: 60 min   Stress: No Stress Concern Present (1/5/2024)    [place] [employer] - Occupational Stress Questionnaire     Feeling of Stress : Not at all   Social Connections: Unknown (1/5/2024)    Social Connection and Isolation Panel [NHANES]     Frequency of Communication with Friends and Family: More than three times a week     Frequency of Social Gatherings with Friends and Family: Patient declined     Active Member of Clubs or Organizations: Yes     Attends Club or Organization Meetings: More than 4 times per year     Marital Status:    Housing Stability: Low Risk  (1/5/2024)    Housing Stability Vital Sign     Unable to Pay for Housing in the Last Year: No     Number of Places Lived in the Last Year: 1     Unstable Housing in the Last Year: No     MEDICATIONS:   Current Outpatient Medications:     albuterol (VENTOLIN HFA) 90 mcg/actuation inhaler, Inhale 2 puffs into the lungs every 6 (six) hours as  "needed for Wheezing. Rescue, Disp: 18 g, Rfl: 0    calcium carbonate-vitamin D3 (CALCIUM 600 WITH VITAMIN D3) 600 mg-10 mcg (400 unit) Chew, Take by mouth once., Disp: , Rfl:     gabapentin (NEURONTIN) 300 MG capsule, Take 1 capsule (300 mg total) by mouth every evening. Take one tablet every evening.  Increase to 3 times per day as needed for 14 days, Disp: 42 capsule, Rfl: 0    ibuprofen (ADVIL,MOTRIN) 200 MG tablet, Take 200 mg by mouth as needed for Pain., Disp: , Rfl:     oxyCODONE (ROXICODONE) 5 MG immediate release tablet, Take 1 tablet (5 mg total) by mouth every 6 (six) hours as needed for Pain. (Patient not taking: Reported on 2024), Disp: 15 tablet, Rfl: 0  ALLERGIES: Review of patient's allergies indicates:  No Known Allergies    Reviewed office visit on 23 with Dr. Sánchez:    IMAGIN. X-ray ordered due to bilateral hip pain. (AP pelvis and frogleg lateral right views) taken 23.   2. X-ray images were reviewed personally by me and then directly with patient.  3. FINDINGS: Bones of the hips and pelvis are intact with mild degenerative changes on bilateral hips.  No bony destruction. Partially visualized lower lumbar spine spondylosis.  4. IMPRESSION: see above    Objective:     VITAL SIGNS: /78   Pulse 65   Ht 5' 1" (1.549 m)   Wt 61.2 kg (135 lb)   BMI 25.51 kg/m²    General    Nursing note and vitals reviewed.  Constitutional: She is oriented to person, place, and time. She appears well-developed and well-nourished.   HENT:   Head: Normocephalic and atraumatic.   no nasal discharge, no external ear redness or discharge   Eyes:   EOM is full and smooth  No eye redness or discharge   Neck: Neck supple. No tracheal deviation present.   Cardiovascular:  Normal rate.            2+ Radial pulse bilaterally  2+ Dorsalis Pedis pulse bilaterally  No LE edema appreciated   Pulmonary/Chest: Effort normal. No respiratory distress.   Abdominal: She exhibits no distension.   No rigidity "   Neurological: She is alert and oriented to person, place, and time. She exhibits normal muscle tone. Coordination normal.   See details below   Psychiatric: She has a normal mood and affect. Her behavior is normal.           MUSCULOSKELETAL EXAM  HIP: bilateral HIP  The affected hip is compared to the contralateral hip.    Observation:    There is no edema, erythema, or ecchymosis in the lumbosacral region.   There is no Trendelenburg sign on either side  No obvious pelvic obliquity while standing.    No thoracolumbar scoliosis observed.    No midline skin abnormalities.  No atrophy noted in the lower limbs.  Gait: Non-antalgic (in right walking boot)     ROM (* = with pain):  Passive hip flexion to 120° on left and 120° on right  Passive hip internal rotation to 45° on left and 45° on right  Passive hip external rotation to 45° on left and 45° on right   Passive hip abduction to 45° on left and 45° on right  All motions above are without pain.    Tenderness To Palpation:  No tenderness at the ASIS, AIIS, PSIS, PIIS, iliac crest, pubic bones, ischial tuberosity.  No tenderness throughout the lumbar spine, iliolumbar region, or posterior pelvis.  No tenderness throughout the sacrum   + bilateral deep hip external rotator tenderness   No tenderness over the greater trochanteric bursa or greater/lesser trochanters.  No tenderness at the glut attachments on the greater trochanter  No tenderness over proximal IT band or hip flexor musculature.    Strength Testing (* = with pain):  Hip flexion - 5/5 on left and 5/5 on right  Hip extension - 5/5 on left and 5/5 on right  Hip adduction - 5/5 on left and 5/5 on right  Hip abduction - 5/5 on left and 5/5 on right  Knee flexion - 5/5 on left and 5/5 on right  Knee extension - 5/5 on left and 5/5 on right  Glutaeus medius - 5-/5 on left and 5-/5 on right    Special Tests:  Standing Trendelenburg test - negative    Seated straight leg raise - negative  Supine straight leg  raise - negative  Hamstring flexibility symmetric    Log roll - negative  CARLITOS - negative  FADIR - negative  Scour test - negative  No pain with posterior hip capsule compression    ASIS compression test - negative  SI drawer test - negative   Thigh thrust test - negative     Piriformis test (Bonnet's) - negative  Ely's test - negative  Quadriceps flexibility symmetric.  Herson test - negative  Jean compression test - negative    Fulcrum test - negative    Leg lengths symmetric following OMT to the pelvis.     Neurovascular Exam:  2+ femoral, DP, and PT pulses BL.  No skin changes, no abnormal hair distribution.  Sensation intact to light touch throughout the obturator and medial/lateral/posterior femoral cutaneous nerves.    TART (Tissue texture abnormality, Asymmetry,  Restriction of motion and/or Tenderness) changes:     Thoracic Spine   T1 Neutral   T2 Neutral   T3 Neutral   T4 Neutral   T5 Neutral   T6 Neutral   T7 Neutral   T8 Neutral   T9 Neutral   T10 Neutral   T11 Neutral   T12 TTA RIGHT     Rib cage: R11-12 TTA on right     Lumbar Spine   L1 TTA RIGHT   L2 TTA RIGHT   L3 Neutral   L4 FRS RIGHT   L5 FRS RIGHT     Pelvis:  Innominate:Right anterior rotation  Pubic bone:Right inferior pubic shear    Sacrum:Right on Left sacral torsion    Lower extremity: Bilateral posterior hip bullseye Herniated trigger point (HTP) fascial distortions    Key   F= Flexed   E = Extended   R = Rotated   S = Sidebent   TTA = tissue texture abnormality     Assessment:      Encounter Diagnoses   Name Primary?    Posterior pain of right hip Yes    Posterior pain of hip, left     Myalgia     Lumbar spondylosis     Somatic dysfunction of lumbar region     Sacral region somatic dysfunction     Somatic dysfunction of pelvic region     Somatic dysfunction of thoracic region     Somatic dysfunction of rib cage region     Somatic dysfunction of lower extremity         Plan:   1. Bilateral posterior hip pain likely secondary to  biomechanical restrictions of the lower kinetic chain with possible contributing factors from underlying lumbar spondylosis partially visualized on recent bilateral hip x-rays.  No significant intra-articular hip symptoms on exam today or any clear lumbar radicular symptoms.  Recommend dedicated lumbar spine x-rays for further evaluation if symptoms persist.  - OMT performed today to address associated biomechanical restrictions  and HEP started.   - Recommend over-the-counter NSAIDs or Tylenol as needed for pain control  - Continue formal PT as scheduled at [facility], continuing home exercise program at home upon completion  -  X-ray images of bilateral hip taken 11/14/23 (AP pelvis and frogleg lateral bilateral views) showed mild bilateral hip DJD changes with partially visualize lower lumbar spine spondylosis. Images were personally reviewed with patient.    2. OMT 5-6 regions. Oral consent obtained.  Reviewed benefits and potential side effects,including bruising at site of treatment and increased soreness for the next 24-48 hours. Pt. Instructed to increased water intake by 1 L today and tomorrow to help with any residual soreness.   - OMT indicated today due to signs and symptoms as well as local and remote somatic dysfunction findings and their related neurokinetic, lymphatic, fascial and/or arteriovenous body connections.   - OMT techniques used: Myofascial Release, Muscle Energy, and Fascial Distortion Model   - Treatment was tolerated well. Improvement noted in segmental mobility post-treatment in dysfunctional regions. There were no adverse events and no complications immediately following treatment.     3. Pt. Given the following HEP:  A)  Pelvic clock exercises given to do from the 6-12 o'clock positions:10-15 reps, twice daily. Hand out of exercise also given.   B) Lower abdominal muscle isotonic exercises: Rotate pelvis into the 6 o'clock position and holding it to engage lower abdominal muscles.  Then lift up leg, one at a time, alternating for 10-15 reps. Repeat exercises 1-2 times daily. Handout given.   C) Quadratus lumborum self-stretch on all fours: hold stretch for 30 seconds, repeating 2-3 times on each side. Do stretch twice daily. Hand-out also given.   D) Continue Clam shell exercises bilaterally: hold leg in abducted and externally rotated position for 5-10 seconds, repeat 5-10 times    [id number] HOME EXERCISE PROGRAM (HEP):  The patient was taught a homegoing physical therapy regimen as described above. The patient demonstrated understanding of the exercises and proper technique of their execution. This interaction took 15 minutes.     4. Follow-up in 4 weeks for reevaluation    5. Patient agreeable to today's plan and all questions were answered    This note is dictated using the M*Modal Fluency Direct word recognition program. There are word recognition mistakes that are occasionally missed on review.

## 2024-01-08 ENCOUNTER — OFFICE VISIT (OUTPATIENT)
Dept: ORTHOPEDICS | Facility: CLINIC | Age: 69
End: 2024-01-08
Payer: MEDICARE

## 2024-01-08 VITALS — WEIGHT: 135 LBS | BODY MASS INDEX: 25.51 KG/M2

## 2024-01-08 DIAGNOSIS — M19.071 ARTHRITIS OF FIRST METATARSOPHALANGEAL (MTP) JOINT OF RIGHT FOOT: Primary | ICD-10-CM

## 2024-01-08 DIAGNOSIS — M20.21 HALLUX RIGIDUS OF RIGHT FOOT: ICD-10-CM

## 2024-01-08 PROCEDURE — 99999 PR PBB SHADOW E&M-EST. PATIENT-LVL III: CPT | Mod: PBBFAC,,, | Performed by: PHYSICIAN ASSISTANT

## 2024-01-08 PROCEDURE — 99213 OFFICE O/P EST LOW 20 MIN: CPT | Mod: PBBFAC | Performed by: PHYSICIAN ASSISTANT

## 2024-01-08 PROCEDURE — 99024 POSTOP FOLLOW-UP VISIT: CPT | Mod: POP,,, | Performed by: PHYSICIAN ASSISTANT

## 2024-01-08 NOTE — PROGRESS NOTES
Postoperative Visit  DOS 12/20/23    History of Present Illness:   Karlie Carrion is s/p right big toe cheilectomy  who comes to the office for 2 weeks post op evaluation. Her pain is well controlled and the patient is not taking narcotic pain medications. The patient is wearing a short boot.  She has been able to bear weight.   She denies fevers or chills.   Still reports some swelling along plantar aspect of forefoot.      Physical Examination:  She is alert, awake and oriented to time, place and person. She does not appear to be in any distress, and denies any constitutional symptoms. Examination of the right big toe demonstrates healing incision with no erythema or drainage.  There is minimal swelling.    Assessment/Plan:  2 weeks s/p right big toe cheilectomy  Sutures were removed today  FWBAT  Advance Shoe wear as pain and swelling allow  Follow up in 4 weeks with Dr. Strickland    All questions were answered in detail. The patient is in full agreement with the treatment plan and will proceed accordingly.

## 2024-02-05 ENCOUNTER — OFFICE VISIT (OUTPATIENT)
Dept: ORTHOPEDICS | Facility: CLINIC | Age: 69
End: 2024-02-05
Payer: MEDICARE

## 2024-02-05 VITALS — WEIGHT: 141.13 LBS | HEIGHT: 61 IN | BODY MASS INDEX: 26.65 KG/M2

## 2024-02-05 DIAGNOSIS — M19.071 ARTHRITIS OF FIRST METATARSOPHALANGEAL (MTP) JOINT OF RIGHT FOOT: Primary | ICD-10-CM

## 2024-02-05 DIAGNOSIS — M77.41 METATARSALGIA OF RIGHT FOOT: ICD-10-CM

## 2024-02-05 DIAGNOSIS — M79.2 NEUROGENIC PAIN OF RIGHT FOOT: ICD-10-CM

## 2024-02-05 PROCEDURE — 99024 POSTOP FOLLOW-UP VISIT: CPT | Mod: POP,,, | Performed by: ORTHOPAEDIC SURGERY

## 2024-02-05 PROCEDURE — 99999 PR PBB SHADOW E&M-EST. PATIENT-LVL III: CPT | Mod: PBBFAC,,, | Performed by: ORTHOPAEDIC SURGERY

## 2024-02-05 PROCEDURE — 99213 OFFICE O/P EST LOW 20 MIN: CPT | Mod: PBBFAC | Performed by: ORTHOPAEDIC SURGERY

## 2024-02-05 NOTE — PROGRESS NOTES
Karlie Carrion  Returns today for follow-up.  She is just over six weeks postop from her right big toe cheilectomy.  She reports that she notices her overall condition is improved since the surgery however she still has some soreness and swelling of the big toe and she has continued forefoot pain and metatarsalgia which was present before the surgery on a more intermittent basis but now is more constant.  She has had a history of a previous right 3rd metatarsal fracture as well as an injury many years ago when a heavy object fell on the dorsum of her foot.  She describes the forefoot pain as burning and numbing in nature which seems to be more neurogenic.    Examination: The right big toe reveals minimal swelling this morning.  Her incision is well healed.  She has improved but still decreased motion of the big toe MTP joint with no significant pain.  She is some mild diffuse tenderness across the forefoot.    Impression:  1. Arthritis of first metatarsophalangeal (MTP) joint of right foot        2. Metatarsalgia of right foot        3. Neurogenic pain of right foot          Recommendation:  I reassured her that she is where she should be from the cheilectomy surgery.  We had some discussion about her other symptoms.  She does not want to necessarily take any medications and we discussed Voltaren gel, Lidoderm patches, shoe modifications and orthotics.  She is going to try to manage her symptoms on her own.  I reminded her that I usually give six months before making a determination on the outcome of the surgery.      Follow-up in three months

## 2024-02-27 DIAGNOSIS — Z00.00 ENCOUNTER FOR MEDICARE ANNUAL WELLNESS EXAM: ICD-10-CM

## 2024-03-08 ENCOUNTER — PATIENT MESSAGE (OUTPATIENT)
Dept: PRIMARY CARE CLINIC | Facility: CLINIC | Age: 69
End: 2024-03-08
Payer: MEDICARE

## 2024-04-24 ENCOUNTER — PATIENT MESSAGE (OUTPATIENT)
Dept: SPORTS MEDICINE | Facility: CLINIC | Age: 69
End: 2024-04-24
Payer: MEDICARE

## 2024-04-24 DIAGNOSIS — M17.11 PRIMARY OSTEOARTHRITIS OF RIGHT KNEE: Primary | ICD-10-CM

## 2024-04-26 ENCOUNTER — PATIENT MESSAGE (OUTPATIENT)
Dept: SPORTS MEDICINE | Facility: CLINIC | Age: 69
End: 2024-04-26
Payer: MEDICARE

## 2024-05-08 ENCOUNTER — PATIENT MESSAGE (OUTPATIENT)
Dept: SPORTS MEDICINE | Facility: CLINIC | Age: 69
End: 2024-05-08
Payer: MEDICARE

## 2024-05-09 NOTE — PROGRESS NOTES
"  Karlie Carrion presented for an initial Medicare AWV today. The following components were reviewed and updated:    Medical history  Family History  Social history  Allergies and Current Medications  Health Risk Assessment  Health Maintenance  Care Team    **See Completed Assessments for Annual Wellness visit with in the encounter summary    The following assessments were completed:  Depression Screening  Cognitive function Screening  Timed Get Up Test  Whisper Test      Opioid documentation:      Patient does not have a current opioid prescription.          Vitals:    05/14/24 1016   BP: 139/74   BP Location: Right arm   Patient Position: Sitting   BP Method: Medium (Manual)   Pulse: 66   Temp: 97.6 °F (36.4 °C)   SpO2: 95%   Weight: 64.2 kg (141 lb 8.6 oz)   Height: 5' 1" (1.549 m)     Body mass index is 26.74 kg/m².       Physical Exam  Vitals reviewed.   Constitutional:       Appearance: Normal appearance. She is normal weight.   HENT:      Head: Normocephalic and atraumatic.      Nose: Nose normal.      Mouth/Throat:      Mouth: Mucous membranes are moist.      Pharynx: Oropharynx is clear.   Eyes:      Extraocular Movements: Extraocular movements intact.      Conjunctiva/sclera: Conjunctivae normal.      Pupils: Pupils are equal, round, and reactive to light.   Cardiovascular:      Rate and Rhythm: Normal rate and regular rhythm.      Pulses: Normal pulses.      Heart sounds: Normal heart sounds.   Pulmonary:      Effort: Pulmonary effort is normal.      Breath sounds: Normal breath sounds.   Musculoskeletal:         General: Normal range of motion.      Cervical back: Normal range of motion and neck supple.   Skin:     General: Skin is warm and dry.      Capillary Refill: Capillary refill takes less than 2 seconds.   Neurological:      General: No focal deficit present.      Mental Status: She is alert and oriented to person, place, and time. Mental status is at baseline.   Psychiatric:         Mood and Affect: " Mood normal.         Behavior: Behavior normal.         Thought Content: Thought content normal.         Judgment: Judgment normal.         Diagnoses and health risks identified today and associated recommendations/orders:  1. Encounter for annual wellness visit (AWV) in Medicare patient      2. Encounter for Medicare annual wellness exam    - Ambulatory Referral/Consult to Enhanced Annual Wellness Visit (eAWV)    3. Encounter for screening for malignant neoplasm of breast, unspecified screening modality  - Last breast cancer screen done with mammogram on 12/13/23. Next due 12/13/24.    4. Colon cancer screening  - Last colon cancer screen done with colonoscopy on 04/25/23 revealing internal hemorrhoids and normal colonoscopy exam. Next due 04/23/33.  ========================================================================================      Provided Karlie with a 5-10 year written screening schedule and personal prevention plan. Recommendations were developed using the USPSTF age appropriate recommendations. Education, counseling, and referrals were provided as needed.  After Visit Summary printed and given to patient which includes a list of additional screenings\tests needed.    No follow-ups on file.          Thuy Espinoza NP

## 2024-05-14 ENCOUNTER — OFFICE VISIT (OUTPATIENT)
Dept: PRIMARY CARE CLINIC | Facility: CLINIC | Age: 69
End: 2024-05-14
Payer: MEDICARE

## 2024-05-14 VITALS
BODY MASS INDEX: 26.73 KG/M2 | OXYGEN SATURATION: 95 % | WEIGHT: 141.56 LBS | HEIGHT: 61 IN | HEART RATE: 66 BPM | TEMPERATURE: 98 F | SYSTOLIC BLOOD PRESSURE: 139 MMHG | DIASTOLIC BLOOD PRESSURE: 74 MMHG

## 2024-05-14 DIAGNOSIS — Z12.39 ENCOUNTER FOR SCREENING FOR MALIGNANT NEOPLASM OF BREAST, UNSPECIFIED SCREENING MODALITY: ICD-10-CM

## 2024-05-14 DIAGNOSIS — Z00.00 ENCOUNTER FOR MEDICARE ANNUAL WELLNESS EXAM: ICD-10-CM

## 2024-05-14 DIAGNOSIS — Z00.00 ENCOUNTER FOR ANNUAL WELLNESS VISIT (AWV) IN MEDICARE PATIENT: Primary | ICD-10-CM

## 2024-05-14 DIAGNOSIS — Z12.11 COLON CANCER SCREENING: ICD-10-CM

## 2024-05-14 DIAGNOSIS — Z00.00 ENCOUNTER FOR PREVENTIVE HEALTH EXAMINATION: ICD-10-CM

## 2024-05-14 PROBLEM — K13.79 OTHER LESIONS OF ORAL MUCOSA: Status: RESOLVED | Noted: 2023-09-28 | Resolved: 2024-05-14

## 2024-05-14 PROCEDURE — 99214 OFFICE O/P EST MOD 30 MIN: CPT | Mod: PBBFAC,PN | Performed by: NURSE PRACTITIONER

## 2024-05-14 PROCEDURE — G0439 PPPS, SUBSEQ VISIT: HCPCS | Mod: ,,, | Performed by: NURSE PRACTITIONER

## 2024-05-14 PROCEDURE — 99999 PR PBB SHADOW E&M-EST. PATIENT-LVL IV: CPT | Mod: PBBFAC,,, | Performed by: NURSE PRACTITIONER

## 2024-05-16 NOTE — PATIENT INSTRUCTIONS
Counseling and Referral of Other Preventative  (Italic type indicates deductible and co-insurance are waived)    Patient Name: Karlie Carrion  Today's Date: 5/16/2024    Health Maintenance       Date Due Completion Date    RSV Vaccine (Age 60+ and Pregnant patients) (1 - 1-dose 60+ series) Never done ---    Hemoglobin A1c (Prediabetes) 12/06/2023 12/6/2022    TETANUS VACCINE 05/14/2025 (Originally [birth year]/1975) ---    LDCT Lung Screen 05/14/2025 (Originally 1/23/2024) 1/23/2023 (Done)    Override on 1/23/2023: Done    Shingles Vaccine (1 of 2) 05/14/2025 (Originally 8/17/2007) ---    COVID-19 Vaccine (8 - 2023-24 season) 05/14/2025 (Originally 2/16/2024) 10/16/2023    Pneumococcal Vaccines (Age 65+) (1 of 2 - PCV) 05/14/2025 (Originally 8/17/1961) ---    Mammogram 12/13/2024 12/13/2023    DEXA Scan 12/13/2024 12/13/2022    Lipid Panel 09/28/2028 9/28/2023    Colorectal Cancer Screening 04/25/2033 4/25/2023        No orders of the defined types were placed in this encounter.    The following information is provided to all patients.  This information is to help you find resources for any of the problems found today that may be affecting your health:                  Living healthy guide: www.[facility].[place].gov      Understanding Diabetes: www.diabetes.org      Eating healthy: www.cdc.gov/healthyweight      CDC home safety checklist: www.cdc.gov/steadi/patient.html      Agency on Aging: www.goea.[place].[facility]      Alcoholics anonymous (AA): www.aa.org      Physical Activity: www.sofiya.nih.gov/ct0gemk      Tobacco use: www.quitwithusla.org

## 2024-05-31 NOTE — PROGRESS NOTES
Subjective:     Karlie Carrion     Chief Complaint   Patient presents with    Right Knee - Pain    Left Knee - Pain     HPI    Consuelo is a 68 y.o. female coming in today for bilateral posterior hip pain. Since last visit the pain has remained unchanged. Pt reports her hip pain has flared up following right big toe cheilectomy for hallux rigidus and 1st MTP arthritis surgery with Dr. Strickland on 12/20/23. She cancelled her follow up visits to [facility] on recovery from this surgery. She does report relief with OMT for several weeks, shell posterior hip FDM.  Pt is compliant with HEP. She previously completed PT with no significant improvement. The pain is better with rest, occasional NSAID use and worse with walking more than 1-2 miles. Pt. describes the pain as a 2/10 achy pain that does not radiate. There has not been any new a fall/injury/ or traumas since last visit.  Pt. denies any new musculoskeletal complaints at this time.     Office note from 1/5/24 reviewed    Joint instability? no  Mechanical locking/clicking? no  Affecting ADL's? yes  Affecting sleep? no    Occupation: retired    PAST MEDICAL HISTORY:   Past Medical History:   Diagnosis Date    Centrilobular emphysema 02/28/2023    HLD (hyperlipidemia)     Osteoporosis     Other lesions of oral mucosa 09/28/2023    Positive colorectal cancer screening using Cologuard test 01/04/2023    Stress fracture of right ankle 12/01/2022     PAST SURGICAL HISTORY:   Past Surgical History:   Procedure Laterality Date    CHEILECTOMY Right 12/20/2023    Procedure: CHEILECTOMY;  Surgeon: Ian Strickland MD;  Location: [facility];  Service: Orthopedics;  Laterality: Right;    KNEE ARTHROSCOPY Bilateral      FAMILY HISTORY:   Family History   Problem Relation Name Age of Onset    Stroke Mother      No Known Problems Father      Mental illness Sister Mary     Malignant hypertension Maternal Grandmother      No Known Problems Maternal Grandfather      No Known Problems Paternal  Grandmother      No Known Problems Paternal Grandfather      Ovarian cancer Other Tracey         cousin-paternal     SOCIAL HISTORY:   Social History     Socioeconomic History    Marital status:    Tobacco Use    Smoking status: Former     Current packs/day: 0.00     Average packs/day: 1.5 packs/day for 25.0 years (37.5 ttl pk-yrs)     Types: Cigarettes     Start date: 1989     Quit date: 2014     Years since quitting: 10.4    Smokeless tobacco: Never   Substance and Sexual Activity    Alcohol use: Not Currently     Comment: don't drink    Drug use: Yes     Types: Marijuana     Comment: daily use, yesterday last use     Social Determinants of Health     Financial Resource Strain: Medium Risk (5/14/2024)    Overall Financial Resource Strain (CARDIA)     Difficulty of Paying Living Expenses: Somewhat hard   Food Insecurity: No Food Insecurity (5/14/2024)    Hunger Vital Sign     Worried About Running Out of Food in the Last Year: Never true     Ran Out of Food in the Last Year: Never true   Transportation Needs: No Transportation Needs (5/14/2024)    PRAPARE - Transportation     Lack of Transportation (Medical): No     Lack of Transportation (Non-Medical): No   Physical Activity: Unknown (5/14/2024)    Exercise Vital Sign     Days of Exercise per Week: 7 days     Minutes of Exercise per Session: Patient unable to answer   Stress: No Stress Concern Present (5/14/2024)    [employer] - Occupational Stress Questionnaire     Feeling of Stress : Only a little   Housing Stability: Low Risk  (5/14/2024)    Housing Stability Vital Sign     Unable to Pay for Housing in the Last Year: No     Homeless in the Last Year: No     MEDICATIONS:   Current Outpatient Medications:     albuterol (VENTOLIN HFA) 90 mcg/actuation inhaler, Inhale 2 puffs into the lungs every 6 (six) hours as needed for Wheezing. Rescue, Disp: 18 g, Rfl: 0    calcium carbonate-vitamin D3 (CALCIUM 600 WITH VITAMIN D3) 600  "mg-10 mcg (400 unit) Chew, Take by mouth once., Disp: , Rfl:     ibuprofen (ADVIL,MOTRIN) 200 MG tablet, Take 200 mg by mouth as needed for Pain., Disp: , Rfl:     ALLERGIES: Review of patient's allergies indicates:  No Known Allergies    Objective:   VITAL SIGNS: /73   Pulse (!) 51   Ht 5' 1" (1.549 m)   Wt 64 kg (141 lb 1.5 oz)   BMI 26.66 kg/m²    General    Vitals reviewed.  Constitutional: She is oriented to person, place, and time. She appears well-developed and well-nourished.   Neurological: She is alert and oriented to person, place, and time.   Psychiatric: She has a normal mood and affect. Her behavior is normal.           MUSCULOSKELETAL EXAM  HIP: bilateral HIP  The affected hip is compared to the contralateral hip.    Observation:    There is no edema, erythema, or ecchymosis in the lumbosacral region.   There is no Trendelenburg sign on either side  No obvious pelvic obliquity while standing.    No thoracolumbar scoliosis observed.    No midline skin abnormalities.  No atrophy noted in the lower limbs.  Gait: Non-antalgic (in right walking boot)     ROM (* = with pain):  Passive hip flexion to 120° on left and 120° on right  Passive hip internal rotation to 45° on left and 45° on right  Passive hip external rotation to 45° on left and 45° on right   Passive hip abduction to 45° on left and 45° on right  All motions above are without pain.    Tenderness To Palpation:  No tenderness at the ASIS, AIIS, PSIS, PIIS, iliac crest, pubic bones, ischial tuberosity.  No tenderness throughout the lumbar spine, iliolumbar region, or posterior pelvis.  No tenderness throughout the sacrum   +glutaeus roseanne tenderness bilaterally   No tenderness over the greater trochanteric bursa or greater/lesser trochanters.  No tenderness at the glut attachments on the greater trochanter  No tenderness over proximal IT band or hip flexor musculature.    Strength Testing (* = with pain):  Hip flexion - 5/5 on left and " 5/5 on right  Hip extension - 5/5 on left and 5/5 on right  Hip adduction - 5/5 on left and 5/5 on right  Hip abduction - 5/5 on left and 5/5 on right  Knee flexion - 5/5 on left and 5/5 on right  Knee extension - 5/5 on left and 5/5 on right  Glutaeus medius - 5/5 on left and 5/5 on right    Special Tests:  Standing Trendelenburg test - negative    Seated straight leg raise - negative  Supine straight leg raise - negative  Hamstring flexibility symmetric    Log roll - negative  CARLITOS - negative  FADIR - negative  Scour test - negative  No pain with posterior hip capsule compression    ASIS compression test - negative  SI drawer test - negative   Thigh thrust test - negative     Piriformis test (Bonnet's) - negative  Ely's test - negative  Quadriceps flexibility symmetric.  Herson test - negative  Jean compression test - negative    Fulcrum test - negative    Leg lengths symmetric following OMT to the pelvis.     Neurovascular Exam:  2+ femoral, DP, and PT pulses BL.  No skin changes, no abnormal hair distribution.  Sensation intact to light touch throughout the obturator and medial/lateral/posterior femoral cutaneous nerves.    TART (Tissue texture abnormality, Asymmetry,  Restriction of motion and/or Tenderness) changes:     Thoracic Spine   T1 Neutral   T2 Neutral   T3 Neutral   T4 Neutral   T5 Neutral   T6 Neutral   T7 Neutral   T8 Neutral   T9 Neutral   T10 Neutral   T11 Neutral   T12 TTA RIGHT   Right QL TTA on right    Rib cage: R11-12 TTA on right     Lumbar Spine   L1 TTA RIGHT   L2 Neutral   L3 ERS RIGHT   L4 ERS RIGHT   L5 ERS RIGHT     Pelvis:  Innominate:Left superior shear  Pubic bone:Left superior pubic shear    Sacrum:Left unilateral flexion    Lower extremity: Bilateral posterior hip bullseye Herniated trigger point (HTP) fascial distortions, left anterior talus    Key   F= Flexed   E = Extended   R = Rotated   S = Sidebent   TTA = tissue texture abnormality     Assessment:      Encounter Diagnoses    Name Primary?    Posterior pain of right hip Yes    Posterior pain of hip, left     Lumbar spondylosis     Myalgia     Somatic dysfunction of thoracic region     Somatic dysfunction of lumbar region     Sacral region somatic dysfunction     Somatic dysfunction of pelvic region     Somatic dysfunction of lower extremity         Plan:   1. Bilateral posterior hip pain likely secondary to biomechanical restrictions of the lower kinetic chain with possible contributing factors from underlying lumbar spondylosis partially visualized on recent bilateral hip x-rays, deteriorated.  No significant intra-articular hip symptoms on exam today or any clear lumbar radicular symptoms.    - OMT performed today to address associated biomechanical restrictions  and HEP started.   - Recommend over-the-counter NSAIDs or Tylenol as needed for pain control  - consider lumbar spine x-rays for further evaluation if symptoms persist  -  X-ray images of bilateral hip taken 11/14/23 (AP pelvis and frogleg lateral bilateral views) showed mild bilateral hip DJD changes with partially visualize lower lumbar spine spondylosis. Images were personally reviewed with patient.    2. OMT 5-6 regions. Oral consent obtained.  Reviewed benefits and potential side effects,including bruising at site of treatment and increased soreness for the next 24-48 hours. Pt. Instructed to increased water intake by 1 L today and tomorrow to help with any residual soreness.   - OMT indicated today due to signs and symptoms as well as local and remote somatic dysfunction findings and their related neurokinetic, lymphatic, fascial and/or arteriovenous body connections.   - OMT techniques used: Myofascial Release, Muscle Energy, and Fascial Distortion Model   - Treatment was tolerated well. Improvement noted in segmental mobility post-treatment in dysfunctional regions. There were no adverse events and no complications immediately following treatment.     3. Reviewed  with patient the following HEP:  Restart:  A)  Pelvic clock exercises given to do from the 6-12 o'clock positions:10-15 reps, twice daily. Hand out of exercise also given.   B) Lower abdominal muscle isotonic exercises: Rotate pelvis into the 6 o'clock position and holding it to engage lower abdominal muscles. Then lift up leg, one at a time, alternating for 10-15 reps. Repeat exercises 1-2 times daily. Handout given.   C) Quadratus lumborum self-stretch on all fours: hold stretch for 30 seconds, repeating 2-3 times on each side. Do stretch twice daily. Hand-out also given.   D) Bird-Dog exercises:  Start with raising 1 arm at a time, then progressing to 1 leg at a time, then progressing to raising right arm and left leg while maintaining core engagement and a neutral pelvis tilt, repeat with opposite leg and arm on other side. Continue movement for 10-15 reps, BID. Handout given    [address]46 HOME EXERCISE PROGRAM (HEP):  The patient was taught a homegoing physical therapy regimen as described above. The patient demonstrated understanding of the exercises and proper technique of their execution. This interaction took 15 minutes.     4. Follow-up in 4 weeks for reevaluation    5. Patient agreeable to today's plan and all questions were answered    This note is dictated using the M*Modal Fluency Direct word recognition program. There are word recognition mistakes that are occasionally missed on review.

## 2024-06-03 ENCOUNTER — PATIENT MESSAGE (OUTPATIENT)
Dept: ORTHOPEDICS | Facility: CLINIC | Age: 69
End: 2024-06-03

## 2024-06-03 ENCOUNTER — OFFICE VISIT (OUTPATIENT)
Dept: ORTHOPEDICS | Facility: CLINIC | Age: 69
End: 2024-06-03
Payer: MEDICARE

## 2024-06-03 VITALS — BODY MASS INDEX: 26.73 KG/M2 | WEIGHT: 141.56 LBS | HEIGHT: 61 IN

## 2024-06-03 DIAGNOSIS — G57.61 MORTON'S NEUROMA OF SECOND INTERSPACE OF RIGHT FOOT: ICD-10-CM

## 2024-06-03 DIAGNOSIS — Z09 FOLLOW-UP EXAMINATION AFTER ORTHOPEDIC SURGERY: ICD-10-CM

## 2024-06-03 DIAGNOSIS — M19.071 ARTHRITIS OF FIRST METATARSOPHALANGEAL (MTP) JOINT OF RIGHT FOOT: Primary | ICD-10-CM

## 2024-06-03 PROCEDURE — 64455 NJX AA&/STRD PLTR COM DG NRV: CPT | Mod: S$PBB,RT,, | Performed by: ORTHOPAEDIC SURGERY

## 2024-06-03 PROCEDURE — 64455 NJX AA&/STRD PLTR COM DG NRV: CPT | Mod: PBBFAC | Performed by: ORTHOPAEDIC SURGERY

## 2024-06-03 PROCEDURE — 99213 OFFICE O/P EST LOW 20 MIN: CPT | Mod: 25,S$PBB,, | Performed by: ORTHOPAEDIC SURGERY

## 2024-06-03 PROCEDURE — 99999PBSHW PR PBB SHADOW TECHNICAL ONLY FILED TO HB: Mod: PBBFAC,,,

## 2024-06-03 PROCEDURE — 99213 OFFICE O/P EST LOW 20 MIN: CPT | Mod: PBBFAC,25 | Performed by: ORTHOPAEDIC SURGERY

## 2024-06-03 PROCEDURE — 99999 PR PBB SHADOW E&M-EST. PATIENT-LVL III: CPT | Mod: PBBFAC,,, | Performed by: ORTHOPAEDIC SURGERY

## 2024-06-03 RX ORDER — METHYLPREDNISOLONE ACETATE 40 MG/ML
40 INJECTION, SUSPENSION INTRA-ARTICULAR; INTRALESIONAL; INTRAMUSCULAR; SOFT TISSUE
Status: COMPLETED | OUTPATIENT
Start: 2024-06-03 | End: 2024-06-03

## 2024-06-03 RX ADMIN — METHYLPREDNISOLONE ACETATE 40 MG: 40 INJECTION, SUSPENSION INTRA-ARTICULAR; INTRALESIONAL; INTRAMUSCULAR; SOFT TISSUE at 08:06

## 2024-06-03 NOTE — PROGRESS NOTES
Karlie Carrion   Returns today for follow-up.  She is now about 5.5 months postop from her right big toe cheilectomy for hallux rigidus and 1st MTP arthritis.  She returns today and reports  that she was pleased with her outcome from the cheilectomy so far.  She still has some soreness and swelling.  She continues to have pain in the ball of her foot  especially around the base of the 2nd and 3rd toes which seems to be consistent with possible Gooden's neuroma.    Examination:   The right big toe reveals no significant swelling.  There is no significant tenderness around the MTP joint.  She has functional but decreased motion of the MTP joint without any significant pain.  She does have tenderness in the right 2nd intermetatarsal space which could be consistent with a Gooden's neuroma.    Impression:   1. Arthritis of first metatarsophalangeal (MTP) joint of right foot        2. status post right big toe cheilectomy        3. 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  She can progress her activities as tolerated.  She does understand she continues to have arthritis which could get worse over time.  She should avoid high impact activities.  I suggested that we could try a diagnostic/therapeutic injection of the right 2nd space to see how much relief she gets to confirm whether or not she has a Gooden's neuroma.  She agreed to the injection.      After verbal consent and sterile prep I injected the right 2nd intermetatarsal space with 2 cc lidocaine and 40 mg of methylprednisolone.      Follow-up in six weeks

## 2024-06-04 ENCOUNTER — OFFICE VISIT (OUTPATIENT)
Dept: SPORTS MEDICINE | Facility: CLINIC | Age: 69
End: 2024-06-04
Payer: MEDICARE

## 2024-06-04 VITALS
DIASTOLIC BLOOD PRESSURE: 73 MMHG | SYSTOLIC BLOOD PRESSURE: 139 MMHG | WEIGHT: 141.13 LBS | HEIGHT: 61 IN | HEART RATE: 51 BPM | BODY MASS INDEX: 26.65 KG/M2

## 2024-06-04 DIAGNOSIS — M99.04 SACRAL REGION SOMATIC DYSFUNCTION: ICD-10-CM

## 2024-06-04 DIAGNOSIS — M99.05 SOMATIC DYSFUNCTION OF PELVIC REGION: ICD-10-CM

## 2024-06-04 DIAGNOSIS — M25.551 POSTERIOR PAIN OF RIGHT HIP: Primary | ICD-10-CM

## 2024-06-04 DIAGNOSIS — M47.816 LUMBAR SPONDYLOSIS: ICD-10-CM

## 2024-06-04 DIAGNOSIS — M99.02 SOMATIC DYSFUNCTION OF THORACIC REGION: ICD-10-CM

## 2024-06-04 DIAGNOSIS — M25.552 POSTERIOR PAIN OF HIP, LEFT: ICD-10-CM

## 2024-06-04 DIAGNOSIS — M99.06 SOMATIC DYSFUNCTION OF LOWER EXTREMITY: ICD-10-CM

## 2024-06-04 DIAGNOSIS — M79.10 MYALGIA: ICD-10-CM

## 2024-06-04 DIAGNOSIS — M99.03 SOMATIC DYSFUNCTION OF LUMBAR REGION: ICD-10-CM

## 2024-06-04 PROCEDURE — 99213 OFFICE O/P EST LOW 20 MIN: CPT | Mod: 25,S$PBB,, | Performed by: NEUROMUSCULOSKELETAL MEDICINE & OMM

## 2024-06-04 PROCEDURE — 98927 OSTEOPATH MANJ 5-6 REGIONS: CPT | Mod: S$PBB,,, | Performed by: NEUROMUSCULOSKELETAL MEDICINE & OMM

## 2024-06-04 PROCEDURE — 98927 OSTEOPATH MANJ 5-6 REGIONS: CPT | Mod: PBBFAC,27 | Performed by: NEUROMUSCULOSKELETAL MEDICINE & OMM

## 2024-06-04 PROCEDURE — 99999 PR PBB SHADOW E&M-EST. PATIENT-LVL III: CPT | Mod: PBBFAC,,, | Performed by: NEUROMUSCULOSKELETAL MEDICINE & OMM

## 2024-06-04 PROCEDURE — 97110 THERAPEUTIC EXERCISES: CPT | Mod: GP,,, | Performed by: NEUROMUSCULOSKELETAL MEDICINE & OMM

## 2024-06-04 PROCEDURE — 99213 OFFICE O/P EST LOW 20 MIN: CPT | Mod: PBBFAC | Performed by: NEUROMUSCULOSKELETAL MEDICINE & OMM

## 2024-07-10 NOTE — PROGRESS NOTES
"Subjective:     Karlie Carrion     Chief Complaint   Patient presents with    Right Hip - Pain    Left Hip - Pain     HPI    Consuelo is a 68 y.o. female coming in today for bilateral posterior hip pain. Since last visit the pain has Improved in her right hip, left hip is still bothering her. She localizes the pain to her posterior hip, reports it "always feels bruised". She does report relief with OMT for several weeks, but had extended bruising and pain with FDM. Pt is compliant with HEP. The pain is better with rest, occasional NSAID use and worse with walking more than 1-2 miles. Pt. describes the pain as a 1/10 achy pain that does not radiate. There has not been any new a fall/injury/ or traumas since last visit.  Pt. denies any new musculoskeletal complaints at this time.     Office note from 6/4/24 reviewed    Joint instability? no  Mechanical locking/clicking? no  Affecting ADL's? yes  Affecting sleep? no    Occupation: retired    PAST MEDICAL HISTORY:   Past Medical History:   Diagnosis Date    Centrilobular emphysema 02/28/2023    HLD (hyperlipidemia)     Osteoporosis     Other lesions of oral mucosa 09/28/2023    Positive colorectal cancer screening using Cologuard test 01/04/2023    Stress fracture of right ankle 12/01/2022     PAST SURGICAL HISTORY:   Past Surgical History:   Procedure Laterality Date    CHEILECTOMY Right 12/20/2023    Procedure: CHEILECTOMY;  Surgeon: Ian Strickland MD;  Location: [facility];  Service: Orthopedics;  Laterality: Right;    KNEE ARTHROSCOPY Bilateral      FAMILY HISTORY:   Family History   Problem Relation Name Age of Onset    Stroke Mother      No Known Problems Father      Mental illness Sister Mary     Malignant hypertension Maternal Grandmother      No Known Problems Maternal Grandfather      No Known Problems Paternal Grandmother      No Known Problems Paternal Grandfather      Ovarian cancer Other Tracey         cousin-paternal     SOCIAL HISTORY:   Social History "     Socioeconomic History    Marital status:    Tobacco Use    Smoking status: Former     Current packs/day: 0.00     Average packs/day: 1.5 packs/day for 25.0 years (37.5 ttl pk-yrs)     Types: Cigarettes     Start date: 1989     Quit date: 2014     Years since quitting: 10.5    Smokeless tobacco: Never   Substance and Sexual Activity    Alcohol use: Not Currently     Comment: don't drink    Drug use: Yes     Types: Marijuana     Comment: daily use, yesterday last use     Social Determinants of Health     Financial Resource Strain: Medium Risk (5/14/2024)    Overall Financial Resource Strain (CARDIA)     Difficulty of Paying Living Expenses: Somewhat hard   Food Insecurity: No Food Insecurity (5/14/2024)    Hunger Vital Sign     Worried About Running Out of Food in the Last Year: Never true     Ran Out of Food in the Last Year: Never true   Transportation Needs: No Transportation Needs (5/14/2024)    PRAPARE - Transportation     Lack of Transportation (Medical): No     Lack of Transportation (Non-Medical): No   Physical Activity: Unknown (5/14/2024)    Exercise Vital Sign     Days of Exercise per Week: 7 days     Minutes of Exercise per Session: Patient unable to answer   Stress: No Stress Concern Present (5/14/2024)    [employer] [place] [employer] - Occupational Stress Questionnaire     Feeling of Stress : Only a little   Housing Stability: Low Risk  (5/14/2024)    Housing Stability Vital Sign     Unable to Pay for Housing in the Last Year: No     Homeless in the Last Year: No     MEDICATIONS:   Current Outpatient Medications:     albuterol (VENTOLIN HFA) 90 mcg/actuation inhaler, Inhale 2 puffs into the lungs every 6 (six) hours as needed for Wheezing. Rescue, Disp: 18 g, Rfl: 0    calcium carbonate-vitamin D3 (CALCIUM 600 WITH VITAMIN D3) 600 mg-10 mcg (400 unit) Chew, Take by mouth once., Disp: , Rfl:     ibuprofen (ADVIL,MOTRIN) 200 MG tablet, Take 200 mg by mouth as needed for Pain., Disp:  ", Rfl:     ALLERGIES: Review of patient's allergies indicates:  No Known Allergies    Objective:   VITAL SIGNS: BP (!) 144/80   Ht 5' 1" (1.549 m)   Wt 64 kg (141 lb 1.5 oz)   BMI 26.66 kg/m²    General    Vitals reviewed.  Constitutional: She is oriented to person, place, and time. She appears well-developed and well-nourished.   Neurological: She is alert and oriented to person, place, and time.   Psychiatric: She has a normal mood and affect. Her behavior is normal.           MUSCULOSKELETAL EXAM  HIP: bilateral HIP  The affected hip is compared to the contralateral hip.    Observation:    There is no edema, erythema, or ecchymosis in the lumbosacral region.   There is no Trendelenburg sign on either side  No obvious pelvic obliquity while standing.    No thoracolumbar scoliosis observed.    No midline skin abnormalities.  No atrophy noted in the lower limbs.  Gait: Non-antalgic (in right walking boot)   + lower core inhibition    ROM (* = with pain):  Passive hip flexion to 120° on left and 120° on right  Passive hip internal rotation to 45° on left and 45° on right  Passive hip external rotation to 45° on left and 45° on right   Passive hip abduction to 45° on left and 45° on right  All motions above are without pain.  + pain with lumbar extension    Tenderness To Palpation:  No tenderness at the ASIS, AIIS, PSIS, PIIS, iliac crest, pubic bones, ischial tuberosity.  No tenderness throughout the lumbar spine, iliolumbar region, or posterior pelvis.  No tenderness throughout the sacrum   +glutaeus roseanne tenderness on left  No tenderness over the greater trochanteric bursa or greater/lesser trochanters.  No tenderness at the glut attachments on the greater trochanter  No tenderness over proximal IT band   + right hip flexor tendon and musculature tenderness    Strength Testing (* = with pain):  Hip flexion - 5/5 on left and 5/5 on right  Hip extension - 5/5 on left and 5/5 on right  Hip adduction - 5/5 on " left and 5/5 on right  Hip abduction - 5/5 on left and 5/5 on right  Knee flexion - 5/5 on left and 5/5 on right  Knee extension - 5/5 on left and 5/5 on right  Glutaeus medius - 5/5 on left and 5/5 on right    Special Tests:  Standing Trendelenburg test - negative    Seated straight leg raise - negative  Supine straight leg raise - negative  Hamstring flexibility symmetric    Log roll - negative  CARLITOS - negative  FADIR - negative  Scour test - negative  No pain with posterior hip capsule compression    ASIS compression test - negative  SI drawer test - negative   Thigh thrust test - negative     Piriformis test (Bonnet's) - negative  Ely's test - negative  Quadriceps flexibility symmetric.  Herson test - negative  Jean compression test - negative    Fulcrum test - negative    Leg lengths symmetric following OMT to the pelvis.     Neurovascular Exam:  2+ femoral, DP, and PT pulses BL.  No skin changes, no abnormal hair distribution.  Sensation intact to light touch throughout the obturator and medial/lateral/posterior femoral cutaneous nerves.    TART (Tissue texture abnormality, Asymmetry,  Restriction of motion and/or Tenderness) changes:     Thoracic Spine   T1 Neutral   T2 Neutral   T3 Neutral   T4 Neutral   T5 Neutral   T6 Neutral   T7 Neutral   T8 Neutral   T9 Neutral   T10 Neutral   T11 Neutral   T12 NS-left,R-right     Rib cage: neutral     Lumbar Spine   L1 NS-left,R-right   L2 NS-left,R-right   L3 Neutral   L4 ERS RIGHT   L5 ERS RIGHT     Pelvis:  Innominate:Left superior shear  Pubic bone:Left superior pubic shear    Sacrum:Right on Right sacral torsion     Lower extremity:right anterior psoas tender point    Key   F= Flexed   E = Extended   R = Rotated   S = Sidebent   TTA = tissue texture abnormality     Assessment:      Encounter Diagnoses   Name Primary?    Posterior pain of hip, left Yes    Psoas tendonitis of right side     Lumbar spondylosis     Myalgia     Somatic dysfunction of lumbar region      Sacral region somatic dysfunction     Somatic dysfunction of pelvic region     Somatic dysfunction of lower extremity     Somatic dysfunction of thoracic region         Plan:   1. Bilateral  hip pain likely secondary to biomechanical restrictions of the lower kinetic chain with possible contributing factors from underlying lumbar spondylosis partially visualized on recent bilateral hip x-rays, slowly improving.  Right hip pain seems to be stemming more from right psoas tendon irritation.  No significant intra-articular hip symptoms on exam today or any clear lumbar radicular symptoms.  No relief with formal PT in the past.    - OMT performed again today to address associated biomechanical restrictions  and HEP reviewed  - Recommend over-the-counter NSAIDs or Tylenol as needed for pain control  - consider lumbar spine x-rays for further evaluation if progress plateaus  -  X-ray images of bilateral hip taken 11/14/23 (AP pelvis and frogleg lateral bilateral views) showed mild bilateral hip DJD changes with partially visualize lower lumbar spine spondylosis. Images were personally reviewed with patient.    2. OMT 5-6 regions. Oral consent obtained.  Reviewed benefits and potential side effects,including bruising at site of treatment and increased soreness for the next 24-48 hours. Pt. Instructed to increased water intake by 1 L today and tomorrow to help with any residual soreness.   - OMT indicated today due to signs and symptoms as well as local and remote somatic dysfunction findings and their related neurokinetic, lymphatic, fascial and/or arteriovenous body connections.   - OMT techniques used: Myofascial Release, Muscle Energy, and Fascial Distortion Model   - Treatment was tolerated well. Improvement noted in segmental mobility post-treatment in dysfunctional regions. There were no adverse events and no complications immediately following treatment.     3. Reviewed with patient the following HEP:  continue:  A)   Pelvic clock exercises given to do from the 6-12 o'clock positions:10-15 reps, twice daily. Hand out of exercise also given.   B) Lower abdominal muscle isotonic exercises: Rotate pelvis into the 6 o'clock position and holding it to engage lower abdominal muscles. Then lift up leg, one at a time, alternating for 10-15 reps. Repeat exercises 1-2 times daily. Handout given.   C) Quadratus lumborum self-stretch on all fours: hold stretch for 30 seconds, repeating 2-3 times on each side. Do stretch twice daily. Hand-out also given.   D) Bird-Dog exercises:  Start with raising 1 arm at a time while maintaining core engagement and a neutral pelvis tilt, repeat with opposite arm on other side. Continue movement for 10-15 reps, BID. Handout given  Add  E) Pt. Given prone prop exercise to stretch bilateral psoas and anterior hip capsule. Hold stretch for 30 sec, repeat 2-3 times, twice daily. Handout given.   F) Plank exercises: Start with 15-30 second reps, increasing as tolerated while maintaining proper form with the goal of  holding static plank for 1 minute. Hand-out given.   G) Abduction resistance band squats: 10-15 reps, 3-4 times a week    39562 HOME EXERCISE PROGRAM (HEP):  The patient was taught a homegoing physical therapy regimen as described above. The patient demonstrated understanding of the exercises and proper technique of their execution. This interaction took 15 minutes.     4. Follow-up in 4 weeks for reevaluation    5. Patient agreeable to today's plan and all questions were answered    This note is dictated using the M*Modal Fluency Direct word recognition program. There are word recognition mistakes that are occasionally missed on review.

## 2024-07-11 ENCOUNTER — OFFICE VISIT (OUTPATIENT)
Dept: SPORTS MEDICINE | Facility: CLINIC | Age: 69
End: 2024-07-11
Payer: MEDICARE

## 2024-07-11 VITALS
DIASTOLIC BLOOD PRESSURE: 80 MMHG | BODY MASS INDEX: 26.65 KG/M2 | WEIGHT: 141.13 LBS | HEIGHT: 61 IN | SYSTOLIC BLOOD PRESSURE: 144 MMHG

## 2024-07-11 DIAGNOSIS — M99.05 SOMATIC DYSFUNCTION OF PELVIC REGION: ICD-10-CM

## 2024-07-11 DIAGNOSIS — M99.03 SOMATIC DYSFUNCTION OF LUMBAR REGION: ICD-10-CM

## 2024-07-11 DIAGNOSIS — M99.04 SACRAL REGION SOMATIC DYSFUNCTION: ICD-10-CM

## 2024-07-11 DIAGNOSIS — M25.552 POSTERIOR PAIN OF HIP, LEFT: Primary | ICD-10-CM

## 2024-07-11 DIAGNOSIS — M47.816 LUMBAR SPONDYLOSIS: ICD-10-CM

## 2024-07-11 DIAGNOSIS — M99.02 SOMATIC DYSFUNCTION OF THORACIC REGION: ICD-10-CM

## 2024-07-11 DIAGNOSIS — M99.06 SOMATIC DYSFUNCTION OF LOWER EXTREMITY: ICD-10-CM

## 2024-07-11 DIAGNOSIS — M79.10 MYALGIA: ICD-10-CM

## 2024-07-11 DIAGNOSIS — M76.11 PSOAS TENDONITIS OF RIGHT SIDE: ICD-10-CM

## 2024-07-11 PROCEDURE — 99213 OFFICE O/P EST LOW 20 MIN: CPT | Mod: PBBFAC,PO | Performed by: NEUROMUSCULOSKELETAL MEDICINE & OMM

## 2024-07-11 PROCEDURE — 99999 PR PBB SHADOW E&M-EST. PATIENT-LVL III: CPT | Mod: PBBFAC,,, | Performed by: NEUROMUSCULOSKELETAL MEDICINE & OMM

## 2024-07-11 PROCEDURE — 98927 OSTEOPATH MANJ 5-6 REGIONS: CPT | Mod: PBBFAC,PO | Performed by: NEUROMUSCULOSKELETAL MEDICINE & OMM

## 2024-07-15 ENCOUNTER — OFFICE VISIT (OUTPATIENT)
Dept: ORTHOPEDICS | Facility: CLINIC | Age: 69
End: 2024-07-15
Payer: MEDICARE

## 2024-07-15 VITALS — WEIGHT: 141.13 LBS | HEIGHT: 61 IN | BODY MASS INDEX: 26.65 KG/M2

## 2024-07-15 DIAGNOSIS — M19.071 ARTHRITIS OF FIRST METATARSOPHALANGEAL (MTP) JOINT OF RIGHT FOOT: Primary | ICD-10-CM

## 2024-07-15 DIAGNOSIS — G57.61 MORTON'S NEUROMA OF SECOND INTERSPACE OF RIGHT FOOT: ICD-10-CM

## 2024-07-15 PROCEDURE — 99213 OFFICE O/P EST LOW 20 MIN: CPT | Mod: S$PBB,,, | Performed by: ORTHOPAEDIC SURGERY

## 2024-07-15 PROCEDURE — 99213 OFFICE O/P EST LOW 20 MIN: CPT | Mod: PBBFAC | Performed by: ORTHOPAEDIC SURGERY

## 2024-07-15 PROCEDURE — 99999 PR PBB SHADOW E&M-EST. PATIENT-LVL III: CPT | Mod: PBBFAC,,, | Performed by: ORTHOPAEDIC SURGERY

## 2024-07-15 NOTE — PROGRESS NOTES
Karlie Carrion  Returns today for follow-up.  She is now about seven months postop from her right big toe cheilectomy for hallux rigidus and 1st MTP arthritis.  Her last visit was on 06/03/2024 at which time she was pleased with the outcome of her cheilectomy but was still having some pain in the ball of her foot around the base of the 2nd and 3rd toes.  I suspected she may have a neuroma and performed a second intermetatarsal space steroid injection.  She returns today and reports that the injection has given some relief and continues to give her some relief but still with pain and a feeling of swelling that effects her function and gait.  She continues to report relief from the cheilectomy with shoe wear but has continued arthritis pain as well.    Examination:  She walks in today with a normal gait.  The right big toe reveals no significant swelling.  She has some mild tenderness of the MTP joint with decreased motion.  She has some continued tenderness in the 2nd intermetatarsal space but not as bad as previous.  She is neurovascularly intact.    Impression:  1. Arthritis of first metatarsophalangeal (MTP) joint of right foot        2. 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  This has been a chronic problem.  With this response, she might be a candidate for a Gooden's neurectomy.  I explained to her I removed these nerves through the plantar aspect of the foot and she would require at least two weeks of avoiding weight on the ball of her foot and up to six weeks before she could resume more than daily activities.  If she were to do surgery she would wait until the end of the year.  She would like to see how she does over the next  several months.  I told her if she has a flare-up that she could come in for another injection.

## 2024-07-30 ENCOUNTER — OFFICE VISIT (OUTPATIENT)
Dept: URGENT CARE | Facility: CLINIC | Age: 69
End: 2024-07-30
Payer: MEDICARE

## 2024-07-30 ENCOUNTER — PATIENT MESSAGE (OUTPATIENT)
Dept: ORTHOPEDICS | Facility: CLINIC | Age: 69
End: 2024-07-30
Payer: MEDICARE

## 2024-07-30 VITALS
DIASTOLIC BLOOD PRESSURE: 65 MMHG | BODY MASS INDEX: 26.62 KG/M2 | WEIGHT: 141 LBS | HEART RATE: 71 BPM | TEMPERATURE: 97 F | OXYGEN SATURATION: 96 % | SYSTOLIC BLOOD PRESSURE: 155 MMHG | HEIGHT: 61 IN | RESPIRATION RATE: 14 BRPM

## 2024-07-30 DIAGNOSIS — J06.9 VIRAL UPPER RESPIRATORY INFECTION: Primary | ICD-10-CM

## 2024-07-30 DIAGNOSIS — Z20.822 ENCOUNTER FOR LABORATORY TESTING FOR COVID-19 VIRUS: ICD-10-CM

## 2024-07-30 DIAGNOSIS — R06.7 SNEEZING: ICD-10-CM

## 2024-07-30 DIAGNOSIS — R50.9 SUBJECTIVE FEVER: ICD-10-CM

## 2024-07-30 LAB
CTP QC/QA: YES
SARS-COV-2 AG RESP QL IA.RAPID: NEGATIVE

## 2024-07-30 PROCEDURE — 87811 SARS-COV-2 COVID19 W/OPTIC: CPT | Mod: QW,S$GLB,, | Performed by: PHYSICIAN ASSISTANT

## 2024-07-30 PROCEDURE — 99213 OFFICE O/P EST LOW 20 MIN: CPT | Mod: S$GLB,,, | Performed by: PHYSICIAN ASSISTANT

## 2024-07-30 NOTE — PATIENT INSTRUCTIONS
- Rest.    - Drink plenty of fluids.  - Viral upper respiratory infections typically run their course in 10-14 days.     - Tylenol (acetaminophen) or Ibuprofen as directed as needed for fever/pain. Avoid tylenol if you have a history of liver disease. Do not take ibuprofen if you have a history of GI bleeding, kidney disease, gastric surgery, or if you take blood thinners.     - You can take over-the-counter claritin, zyrtec, allegra, or xyzal as directed. These are antihistamines that can help with runny nose, nasal congestion, sneezing, and helps to dry up post-nasal drip, which usually causes sore throat and cough.   - If you do NOT have high blood pressure, you may use a decongestant form (D)  of this medication (ie. Claritin- D, zyrtec-D, allegra-D) or if you do not take the D form, you can take sudafed (pseudoephedrine) over the counter, which is a decongestant. Do NOT take two decongestant (D) medications at the same time (such as mucinex-D and claritin-D or plain sudafed and claritin D)    - You can use Flonase (fluticasone) nasal spray as directed for sinus congestion and postnasal drip. This is a steroid nasal spray that works locally over time to decrease the inflammation in your nose/sinuses and help with allergic symptoms. This is not an quick- relief spray like afrin, but it works well if used daily.  Discontinue if you develop nose bleed  - use OTC nasal saline prior to Flonase.  - you can use OTC nasal saline such as Ocean Spray Nasal Saline 1-3 puffs each nostril every 2-3 hours then blow out onto tissue. This is to irrigate the nasal passage way to clear the sinus openings. Use until sinus problem resolved.    - you can take plain OTC Mucinex (guaifenesin) 1200 mg twice a day to help loosen mucous.     -warm salt water gargles can help with sore throat    - warm tea with honey can help with cough. Honey is a natural cough suppressant.    - Dextromethorphan (DM) is a cough suppressant over the  counter (ie. mucinex DM, robitussin, delsym; dayquil/nyquil has DM as well.)    - Follow up with your PCP or specialty clinic as directed in the next 1-2 weeks if not improved or as needed.  You can call [phone] to schedule an appointment with the appropriate provider.      - Go to the ER if you develop new or worsening symptoms.     - You must understand that you have received an Urgent Care treatment only and that you may be released before all of your medical problems are known or treated.   - You, the patient, will arrange for follow up care as instructed.   - If your condition worsens or fails to improve we recommend that you receive another evaluation at the ER immediately or contact your PCP to discuss your concerns or return here.     Elevated Blood Pressure  Your blood pressure was elevated during your visit to the urgent care.  It was not so high that immediate care was needed but it is recommended that you monitor your blood pressure over the next week or two to make sure that it is not staying elevated.  Please have your blood pressure taken 2-3 times daily at different times of the day.  Write all of those blood pressures down and record the time that they were taken.  Keep all that information and take it with you to see your Primary Care Physician.  If your blood pressure is consistently above 140/90 you will need to follow up with your PCP more quickly

## 2024-07-30 NOTE — PROGRESS NOTES
"Subjective:      Patient ID: Karlie Carrion is a 68 y.o. female.    Vitals:  height is 5' 1" (1.549 m) and weight is 64 kg (141 lb). Her tympanic temperature is 96.9 °F (36.1 °C). Her blood pressure is 155/65 (abnormal) and her pulse is 71. Her respiration is 14 and oxygen saturation is 96%.     Chief Complaint: Sinus Problem    68 y.o female c/o sneezing, headaches, congestion/rhinorrhea, feeling feverish intermittently.  Symptoms began 3-4 days ago mildly, today she has been doing a lot of sneezing.  Patient is requesting to be tested for COVID. Patient also reports that she took a benadryl due to all the sneezing.     Sinus Problem  This is a new problem. Episode onset: 4 days ago. The problem has been gradually worsening since onset. Her pain is at a severity of 0/10. She is experiencing no pain. Associated symptoms include congestion, coughing, headaches, sinus pressure and sneezing. Pertinent negatives include no chills, diaphoresis, ear pain, hoarse voice, neck pain, shortness of breath, sore throat or swollen glands. Past treatments include oral decongestants. The treatment provided no relief.       Constitution: Positive for fever (Subjective). Negative for chills, sweating and fatigue.   HENT:  Positive for congestion and sinus pressure. Negative for ear pain and sore throat.    Neck: Negative for neck pain and neck stiffness.   Cardiovascular:  Negative for chest pain, leg swelling and palpitations.   Eyes:  Negative for eye itching, eye pain and eye redness.   Respiratory:  Positive for cough. Negative for sputum production and shortness of breath.    Gastrointestinal:  Negative for abdominal pain, nausea, vomiting and diarrhea.   Genitourinary:  Negative for dysuria, frequency and urgency.   Musculoskeletal:  Negative for pain and joint swelling.   Skin:  Negative for color change and rash.   Allergic/Immunologic: Positive for sneezing.   Neurological:  Positive for headaches. Negative for dizziness, " disorientation, altered mental status, numbness and tingling.   Psychiatric/Behavioral:  Negative for altered mental status and disorientation.       Objective:     Physical Exam   Constitutional: She is oriented to person, place, and time. She appears well-developed. She is cooperative.  Non-toxic appearance. She does not appear ill. No distress.   HENT:   Head: Normocephalic and atraumatic.   Ears:   Right Ear: Hearing, tympanic membrane, external ear and ear canal normal.   Left Ear: Hearing, tympanic membrane, external ear and ear canal normal.   Nose: Mucosal edema present. No rhinorrhea or nasal deformity. No epistaxis. Right sinus exhibits no maxillary sinus tenderness and no frontal sinus tenderness. Left sinus exhibits no maxillary sinus tenderness and no frontal sinus tenderness.   Mouth/Throat: Uvula is midline, oropharynx is clear and moist and mucous membranes are normal. No trismus in the jaw. Normal dentition. No uvula swelling. No oropharyngeal exudate, posterior oropharyngeal edema or posterior oropharyngeal erythema.   Eyes: Conjunctivae and lids are normal. No scleral icterus.   Neck: Trachea normal and phonation normal. Neck supple. No edema present. No erythema present. No neck rigidity present.   Cardiovascular: Normal rate, regular rhythm, normal heart sounds and normal pulses.   Pulmonary/Chest: Effort normal and breath sounds normal. No accessory muscle usage or stridor. No tachypnea and no bradypnea. No respiratory distress. She has no decreased breath sounds. She has no wheezes. She has no rhonchi. She has no rales.   Abdominal: Normal appearance.   Musculoskeletal: Normal range of motion.         General: No deformity. Normal range of motion.   Lymphadenopathy:     She has no cervical adenopathy.   Neurological: She is alert and oriented to person, place, and time. She exhibits normal muscle tone. Coordination normal.   Skin: Skin is warm, dry, intact, not diaphoretic and not pale.    Psychiatric: Her speech is normal and behavior is normal. Judgment and thought content normal.   Nursing note and vitals reviewed.    Results for orders placed or performed in visit on 07/30/24   SARS Coronavirus 2 Antigen, POCT Manual Read   Result Value Ref Range    SARS Coronavirus 2 Antigen Negative Negative     Acceptable Yes          Assessment:     1. Viral upper respiratory infection    2. Sneezing    3. Subjective fever    4. Encounter for laboratory testing for COVID-19 virus        Plan:       Viral upper respiratory infection  -     SARS Coronavirus 2 Antigen, POCT Manual Read    Sneezing    Subjective fever    Encounter for laboratory testing for COVID-19 virus      - Discussed likely viral vs. Allergic etiology, home care, tx options, and given follow up precautions. Pt declined rx. Her concern was to rule out covid.   I have reviewed the patient's chart to view previous visits, labs, and imaging to assess PMH and look for any trends or previous treatments.

## 2024-07-31 DIAGNOSIS — G57.61 MORTON'S NEUROMA OF SECOND INTERSPACE OF RIGHT FOOT: Primary | ICD-10-CM

## 2024-08-07 ENCOUNTER — PATIENT MESSAGE (OUTPATIENT)
Dept: ORTHOPEDICS | Facility: CLINIC | Age: 69
End: 2024-08-07
Payer: MEDICARE

## 2024-08-09 ENCOUNTER — PATIENT MESSAGE (OUTPATIENT)
Dept: ORTHOPEDICS | Facility: CLINIC | Age: 69
End: 2024-08-09
Payer: MEDICARE

## 2024-08-23 ENCOUNTER — OFFICE VISIT (OUTPATIENT)
Dept: DERMATOLOGY | Facility: CLINIC | Age: 69
End: 2024-08-23
Payer: MEDICARE

## 2024-08-23 DIAGNOSIS — D22.9 BENIGN MOLE: ICD-10-CM

## 2024-08-23 DIAGNOSIS — L81.4 LENTIGO: ICD-10-CM

## 2024-08-23 DIAGNOSIS — L82.1 SEBORRHEIC KERATOSIS: ICD-10-CM

## 2024-08-23 DIAGNOSIS — L30.4 INTERTRIGO: Primary | ICD-10-CM

## 2024-08-23 DIAGNOSIS — Z12.83 SKIN CANCER SCREENING: ICD-10-CM

## 2024-08-23 DIAGNOSIS — Z76.89 ENCOUNTER FOR SKIN CARE: ICD-10-CM

## 2024-08-23 PROCEDURE — 99213 OFFICE O/P EST LOW 20 MIN: CPT | Mod: PBBFAC,PN | Performed by: DERMATOLOGY

## 2024-08-23 PROCEDURE — 99999 PR PBB SHADOW E&M-EST. PATIENT-LVL III: CPT | Mod: PBBFAC,,, | Performed by: DERMATOLOGY

## 2024-08-23 RX ORDER — CLOTRIMAZOLE 1 %
CREAM (GRAM) TOPICAL 2 TIMES DAILY
Qty: 60 G | Refills: 8 | Status: SHIPPED | OUTPATIENT
Start: 2024-08-23

## 2024-08-23 NOTE — PROGRESS NOTES
Subjective:      Patient ID:  Karlie Carrion is a 69 y.o. female who presents for   Chief Complaint   Patient presents with    Skin Check     UBSE     Patient is here today for a Upper skin check.   Pt has a history of  moderate sun exposure in the past.   Pt recalls several blistering sunburns in the past- yes,teens  Pt has history of tanning bed use- yes  Pt has  had moles removed in the past- yes   Pt has history of melanoma in first degree relatives-  none    Pt has no h/o NMSC or MM.      Pt would like to discuss varicose veins in both legs around the knees.       Review of Systems   Skin:  Positive for rash and wears hat. Negative for daily sunscreen use, activity-related sunscreen use and recent sunburn.   Hematologic/Lymphatic: Bruises/bleeds easily (Bruise).       Objective:   Physical Exam   Constitutional: She appears well-developed and well-nourished. No distress.   Neurological: She is alert and oriented to person, place, and time. She is not disoriented.   Psychiatric: She has a normal mood and affect.   Skin:   Areas Examined (abnormalities noted in diagram):   Scalp / Hair Palpated and Inspected  Head / Face Inspection Performed  Neck Inspection Performed  Chest / Axilla Inspection Performed  Abdomen Inspection Performed  Back Inspection Performed  RUE Inspected  LUE Inspection Performed  RLE Inspected  LLE Inspection Performed                 Diagram Legend     Erythematous scaling macule/papule c/w actinic keratosis       Vascular papule c/w angioma      Pigmented verrucoid papule/plaque c/w seborrheic keratosis      Yellow umbilicated papule c/w sebaceous hyperplasia      Irregularly shaped tan macule c/w lentigo     1-2 mm smooth white papules consistent with Milia      Movable subcutaneous cyst with punctum c/w epidermal inclusion cyst      Subcutaneous movable cyst c/w pilar cyst      Firm pink to brown papule c/w dermatofibroma      Pedunculated fleshy papule(s) c/w skin tag(s)      Evenly  pigmented macule c/w junctional nevus     Mildly variegated pigmented, slightly irregular-bordered macule c/w mildly atypical nevus      Flesh colored to evenly pigmented papule c/w intradermal nevus       Pink pearly papule/plaque c/w basal cell carcinoma      Erythematous hyperkeratotic cursted plaque c/w SCC      Surgical scar with no sign of skin cancer recurrence      Open and closed comedones      Inflammatory papules and pustules      Verrucoid papule consistent consistent with wart     Erythematous eczematous patches and plaques     Dystrophic onycholytic nail with subungual debris c/w onychomycosis     Umbilicated papule    Erythematous-base heme-crusted tan verrucoid plaque consistent with inflamed seborrheic keratosis     Erythematous Silvery Scaling Plaque c/w Psoriasis     See annotation      Assessment / Plan:        Intertrigo  -     clotrimazole (LOTRIMIN) 1 % cream; Apply topically 2 (two) times daily. Prn rash under breasts  Dispense: 60 g; Refill: 8  Discussed with patient the etiology and pathogenesis of the disease or skin lesion(s) and possible treatments and aggravators.    Chronic nature of this condition discussed with patient.  Reviewed with patient different treatment options and associated risks.  Proper application of medications and or care for affected area(s) and condition(s) reviewed.    Seborrheic keratosis  Discussed with patient the benign nature of these lesions and that no treatment is indicated.    Benign mole  Patient to watch for any suspicious changes of skin or skin spots, including color changes, darkening, bleeding, scabbing, increase in size, pain, itch, or worsening.  Patient to come in for more evaluation if any such occurs.    Lentigo  Discussed with patient the benign nature of these lesions and that no treatment is indicated.  Chronic nature of this condition discussed with patient.    Skin cancer screening  No other seriously suspicious lesions noted for body areas  examined today.  Patient to inform of us if they notice any dark or changing or suspicious spots in areas not examined today.  Follow up for routine monitoring recommended to patient.    Instructed patient to watch out for dark spots, bleeding spots, crusty spots, sores that break out repeatedly in the same spot.  These characteristics are risk factors for skin cancer, and patient is to notify us if they experience any of these symptoms.    Encounter for skin care  Patient instructed in importance in daily sun protection. Sun avoidance and topical protection discussed.     Patient encouraged to wear hat for all outdoor exposure.     Also discussed sun protective clothing.  Shower sooner than later after exercise, exertion, or sweating.  Can do wash cloth wipes if more convenient.  Sweat can cause irritation and may exacerbate skin conditions.  Use corn starch as a drying powder.             Follow up in about 1 year (around 8/23/2025) for TBSE.

## 2024-08-23 NOTE — PATIENT INSTRUCTIONS
Educated patient about keeping body folds free of sweat with routine wiping and regular cornstarch usage in addition to prescription therapy.

## 2024-08-23 NOTE — PROGRESS NOTES
Subjective:      Patient ID:  Karlie Carrion is a 69 y.o. female who presents for   Chief Complaint   Patient presents with    Skin Check     UBSE     Patient is here today for a Upper skin check.   Pt has a history of  moderate sun exposure in the past.   Pt recalls several blistering sunburns in the past- yes,teens  Pt has history of tanning bed use- yes  Pt has  had moles removed in the past- yes   Pt has history of melanoma in first degree relatives-  none    Pt has no h/o NMSC or MM.      Pt would like to discuss varicose veins in both legs around the knees.     Review of Systems   Skin:  Positive for wears hat. Negative for daily sunscreen use, activity-related sunscreen use and recent sunburn.   Hematologic/Lymphatic: Bruises/bleeds easily (Bruise).     Objective:   Physical Exam   Constitutional: She appears well-developed and well-nourished. No distress.   Neurological: She is alert and oriented to person, place, and time. She is not disoriented.   Psychiatric: She has a normal mood and affect.   Skin:   Areas Examined (abnormalities noted in diagram):   Head / Face Inspection Performed  Neck Inspection Performed  Chest / Axilla Inspection Performed  Back Inspection Performed  RUE Inspected  LUE Inspection Performed     Diagram Legend     Erythematous scaling macule/papule c/w actinic keratosis       Vascular papule c/w angioma      Pigmented verrucoid papule/plaque c/w seborrheic keratosis      Yellow umbilicated papule c/w sebaceous hyperplasia      Irregularly shaped tan macule c/w lentigo     1-2 mm smooth white papules consistent with Milia      Movable subcutaneous cyst with punctum c/w epidermal inclusion cyst      Subcutaneous movable cyst c/w pilar cyst      Firm pink to brown papule c/w dermatofibroma      Pedunculated fleshy papule(s) c/w skin tag(s)      Evenly pigmented macule c/w junctional nevus     Mildly variegated pigmented, slightly irregular-bordered macule c/w mildly atypical nevus       Flesh colored to evenly pigmented papule c/w intradermal nevus       Pink pearly papule/plaque c/w basal cell carcinoma      Erythematous hyperkeratotic cursted plaque c/w SCC      Surgical scar with no sign of skin cancer recurrence      Open and closed comedones      Inflammatory papules and pustules      Verrucoid papule consistent consistent with wart     Erythematous eczematous patches and plaques     Dystrophic onycholytic nail with subungual debris c/w onychomycosis     Umbilicated papule    Erythematous-base heme-crusted tan verrucoid plaque consistent with inflamed seborrheic keratosis     Erythematous Silvery Scaling Plaque c/w Psoriasis     See annotation      Assessment / Plan:        There are no diagnoses linked to this encounter.         No follow-ups on file.

## 2024-08-29 ENCOUNTER — PATIENT MESSAGE (OUTPATIENT)
Dept: ORTHOPEDICS | Facility: CLINIC | Age: 69
End: 2024-08-29
Payer: MEDICARE

## 2024-09-07 ENCOUNTER — PATIENT MESSAGE (OUTPATIENT)
Dept: SPORTS MEDICINE | Facility: CLINIC | Age: 69
End: 2024-09-07
Payer: MEDICARE

## 2024-10-14 ENCOUNTER — PATIENT MESSAGE (OUTPATIENT)
Dept: ORTHOPEDICS | Facility: CLINIC | Age: 69
End: 2024-10-14
Payer: MEDICARE

## 2024-10-15 ENCOUNTER — PATIENT MESSAGE (OUTPATIENT)
Dept: SPORTS MEDICINE | Facility: CLINIC | Age: 69
End: 2024-10-15
Payer: MEDICARE

## 2024-10-15 DIAGNOSIS — M17.11 PRIMARY OSTEOARTHRITIS OF RIGHT KNEE: Primary | ICD-10-CM

## 2024-10-15 DIAGNOSIS — G57.61 MORTON'S NEUROMA OF SECOND INTERSPACE OF RIGHT FOOT: Primary | ICD-10-CM

## 2024-10-31 ENCOUNTER — OFFICE VISIT (OUTPATIENT)
Dept: PAIN MEDICINE | Facility: CLINIC | Age: 69
End: 2024-10-31
Payer: MEDICARE

## 2024-10-31 VITALS
WEIGHT: 141.13 LBS | BODY MASS INDEX: 26.65 KG/M2 | HEIGHT: 61 IN | DIASTOLIC BLOOD PRESSURE: 76 MMHG | SYSTOLIC BLOOD PRESSURE: 160 MMHG | HEART RATE: 46 BPM

## 2024-10-31 DIAGNOSIS — M79.671 RIGHT FOOT PAIN: ICD-10-CM

## 2024-10-31 DIAGNOSIS — G57.61 MORTON'S NEUROMA OF SECOND INTERSPACE OF RIGHT FOOT: Primary | ICD-10-CM

## 2024-10-31 DIAGNOSIS — M25.552 LEFT HIP PAIN: ICD-10-CM

## 2024-10-31 PROCEDURE — 99999 PR PBB SHADOW E&M-EST. PATIENT-LVL III: CPT | Mod: PBBFAC,,, | Performed by: STUDENT IN AN ORGANIZED HEALTH CARE EDUCATION/TRAINING PROGRAM

## 2024-10-31 PROCEDURE — 99213 OFFICE O/P EST LOW 20 MIN: CPT | Mod: PBBFAC | Performed by: STUDENT IN AN ORGANIZED HEALTH CARE EDUCATION/TRAINING PROGRAM

## 2024-10-31 PROCEDURE — 99204 OFFICE O/P NEW MOD 45 MIN: CPT | Mod: S$PBB,,, | Performed by: STUDENT IN AN ORGANIZED HEALTH CARE EDUCATION/TRAINING PROGRAM

## 2024-11-01 ENCOUNTER — PATIENT MESSAGE (OUTPATIENT)
Dept: ORTHOPEDICS | Facility: CLINIC | Age: 69
End: 2024-11-01
Payer: MEDICARE

## 2024-11-05 ENCOUNTER — OFFICE VISIT (OUTPATIENT)
Dept: SPORTS MEDICINE | Facility: CLINIC | Age: 69
End: 2024-11-05
Payer: MEDICARE

## 2024-11-05 VITALS
HEART RATE: 51 BPM | BODY MASS INDEX: 26.3 KG/M2 | WEIGHT: 139.31 LBS | SYSTOLIC BLOOD PRESSURE: 139 MMHG | DIASTOLIC BLOOD PRESSURE: 82 MMHG | HEIGHT: 61 IN

## 2024-11-05 DIAGNOSIS — M17.11 PRIMARY OSTEOARTHRITIS OF RIGHT KNEE: Primary | ICD-10-CM

## 2024-11-05 PROCEDURE — 99999 PR PBB SHADOW E&M-EST. PATIENT-LVL III: CPT | Mod: PBBFAC,,, | Performed by: PHYSICIAN ASSISTANT

## 2024-11-05 PROCEDURE — 99213 OFFICE O/P EST LOW 20 MIN: CPT | Mod: PBBFAC | Performed by: PHYSICIAN ASSISTANT

## 2024-11-05 PROCEDURE — 99999PBSHW PR PBB SHADOW TECHNICAL ONLY FILED TO HB: Mod: JZ,PBBFAC,,

## 2024-11-05 PROCEDURE — 20610 DRAIN/INJ JOINT/BURSA W/O US: CPT | Mod: PBBFAC,RT | Performed by: PHYSICIAN ASSISTANT

## 2024-11-05 RX ADMIN — Medication 20 MG: at 12:11

## 2024-11-05 NOTE — PROGRESS NOTES
Patient is here for follow up of right knee arthritis. Pt is requesting Euflexxa injection #1.  [facility] Has failed other conservative modalities including NSAIDS, activity modification, weight loss.      PHYSICAL EXAMINATION:     General: The patient is alert and oriented x 3. Mood is pleasant.   Observation of ears, eyes and nose reveals no gross abnormalities. No   labored breathing observed.     No signs of infection or adverse reaction to knee.    PROCEDURE NOTE:  Injection Procedure  A time out was performed, including verification of patient ID, procedure, site and side, availability of information and equipment, review of safety issues, and agreement with consent, the procedure site was marked.    After time out was performed, the patient was prepped aseptically with povidone-iodine swabsticks. A diagnostic and therapeutic injection of 2cc Euflexxa was given under sterile technique using a 22g x 1.5 needle from the Superolateral  aspect of the right Knee Joint in the supine position.      Karlie Carrion had no adverse reactions to the medication. Pain decreased. She was instructed to apply ice to the joint for 20 minutes and avoid strenuous activities for 24-36 hours following the injection. She was warned of possible blood sugar and/or blood pressure changes during that time. Following that time, she can resume regular activities.    She was reminded to call the clinic immediately for any adverse side effects as explained in clinic today.     RTC 1 week for 2nd injection.  All questions were answered, pt will contact us for questions or concerns in the interim.

## 2024-11-12 ENCOUNTER — OFFICE VISIT (OUTPATIENT)
Dept: SPORTS MEDICINE | Facility: CLINIC | Age: 69
End: 2024-11-12
Payer: MEDICARE

## 2024-11-12 VITALS
SYSTOLIC BLOOD PRESSURE: 161 MMHG | HEIGHT: 61 IN | HEART RATE: 56 BPM | DIASTOLIC BLOOD PRESSURE: 68 MMHG | WEIGHT: 140.88 LBS | BODY MASS INDEX: 26.6 KG/M2

## 2024-11-12 DIAGNOSIS — M17.11 PRIMARY OSTEOARTHRITIS OF RIGHT KNEE: Primary | ICD-10-CM

## 2024-11-12 PROCEDURE — 99999PBSHW PR PBB SHADOW TECHNICAL ONLY FILED TO HB: Mod: JZ,PBBFAC,,

## 2024-11-12 PROCEDURE — 99213 OFFICE O/P EST LOW 20 MIN: CPT | Mod: PBBFAC | Performed by: PHYSICIAN ASSISTANT

## 2024-11-12 PROCEDURE — 99999 PR PBB SHADOW E&M-EST. PATIENT-LVL III: CPT | Mod: PBBFAC,,, | Performed by: PHYSICIAN ASSISTANT

## 2024-11-12 RX ADMIN — Medication 20 MG: at 01:11

## 2024-11-12 NOTE — PROGRESS NOTES
Patient is here for follow up of right knee arthritis. Pt is requesting Euflexxa injection #2.  [facility] reviewed per encounter record. Has failed other conservative modalities including NSAIDS, activity modification, weight loss.      PHYSICAL EXAMINATION:     General: The patient is alert and oriented x 3. Mood is pleasant.   Observation of ears, eyes and nose reveals no gross abnormalities. No   labored breathing observed.     No signs of infection or adverse reaction to knee.    PROCEDURE NOTE:  Injection Procedure  A time out was performed, including verification of patient ID, procedure, site and side, availability of information and equipment, review of safety issues, and agreement with consent, the procedure site was marked.    After time out was performed, the patient was prepped aseptically with povidone-iodine swabsticks. A diagnostic and therapeutic injection of 2cc Euflexxa was given under sterile technique using a 22g x 1.5 needle from the Superolateral  aspect of the right Knee Joint in the supine position.      Karlie Carrion had no adverse reactions to the medication. Pain decreased. She was instructed to apply ice to the joint for 20 minutes and avoid strenuous activities for 24-36 hours following the injection. She was warned of possible blood sugar and/or blood pressure changes during that time. Following that time, she can resume regular activities.    She was reminded to call the clinic immediately for any adverse side effects as explained in clinic today.     RTC 1 week for 3rd injection.  All questions were answered, pt will contact us for questions or concerns in the interim.

## 2024-11-19 ENCOUNTER — OFFICE VISIT (OUTPATIENT)
Dept: SPORTS MEDICINE | Facility: CLINIC | Age: 69
End: 2024-11-19
Payer: MEDICARE

## 2024-11-19 VITALS — WEIGHT: 138.88 LBS | BODY MASS INDEX: 26.22 KG/M2 | HEIGHT: 61 IN

## 2024-11-19 DIAGNOSIS — M17.11 PRIMARY OSTEOARTHRITIS OF RIGHT KNEE: Primary | ICD-10-CM

## 2024-11-19 PROCEDURE — 99999 PR PBB SHADOW E&M-EST. PATIENT-LVL III: CPT | Mod: PBBFAC,,, | Performed by: PHYSICIAN ASSISTANT

## 2024-11-19 PROCEDURE — 20610 DRAIN/INJ JOINT/BURSA W/O US: CPT | Mod: PBBFAC | Performed by: PHYSICIAN ASSISTANT

## 2024-11-19 PROCEDURE — 99999PBSHW PR PBB SHADOW TECHNICAL ONLY FILED TO HB: Mod: JZ,PBBFAC,,

## 2024-11-19 PROCEDURE — 99213 OFFICE O/P EST LOW 20 MIN: CPT | Mod: PBBFAC | Performed by: PHYSICIAN ASSISTANT

## 2024-11-19 RX ADMIN — Medication 20 MG: at 09:11

## 2024-11-19 NOTE — PROGRESS NOTES
Patient is here for follow up of right knee arthritis. Pt is requesting Euflexxa injection #3.  [facility] reviewed per encounter record. Has failed other conservative modalities including NSAIDS, activity modification, weight loss.    Tolerated the previous injections well.    PHYSICAL EXAMINATION:     General: The patient is alert and oriented x 3. Mood is pleasant.   Observation of ears, eyes and nose reveals no gross abnormalities. No   labored breathing observed.     No signs of infection or adverse reaction to knee.    PROCEDURE NOTE:  Injection Procedure  A time out was performed, including verification of patient ID, procedure, site and side, availability of information and equipment, review of safety issues, and agreement with consent, the procedure site was marked.    After time out was performed, the patient was prepped aseptically with povidone-iodine swabsticks. A diagnostic and therapeutic injection of 2cc Euflexxa was given under sterile technique using a 22g x 1.5 needle from the Superolateral  aspect of the right Knee Joint in the supine position.      Karlie Carrion had no adverse reactions to the medication. Pain decreased. She was instructed to apply ice to the joint for 20 minutes and avoid strenuous activities for 24-36 hours following the injection. She was warned of possible blood sugar and/or blood pressure changes during that time. Following that time, she can resume regular activities.    She was reminded to call the clinic immediately for any adverse side effects as explained in clinic today.     RTC prn.  All questions were answered, pt will contact us for questions or concerns in the interim.

## 2024-11-22 ENCOUNTER — HOSPITAL ENCOUNTER (OUTPATIENT)
Dept: RADIOLOGY | Facility: HOSPITAL | Age: 69
Discharge: HOME | End: 2024-11-22
Attending: STUDENT IN AN ORGANIZED HEALTH CARE EDUCATION/TRAINING PROGRAM
Payer: MEDICARE

## 2024-11-22 DIAGNOSIS — G57.61 MORTON'S NEUROMA OF SECOND INTERSPACE OF RIGHT FOOT: ICD-10-CM

## 2024-11-22 LAB
CREAT SERPL-MCNC: 0.8 MG/DL (ref 0.5–1.4)
SAMPLE: NORMAL

## 2024-11-22 PROCEDURE — A9585 GADOBUTROL INJECTION: HCPCS | Performed by: STUDENT IN AN ORGANIZED HEALTH CARE EDUCATION/TRAINING PROGRAM

## 2024-11-22 PROCEDURE — 25500020 PHARM REV CODE 255: Performed by: STUDENT IN AN ORGANIZED HEALTH CARE EDUCATION/TRAINING PROGRAM

## 2024-11-22 PROCEDURE — 73720 MRI LWR EXTREMITY W/O&W/DYE: CPT | Mod: TC,RT

## 2024-11-22 PROCEDURE — 73720 MRI LWR EXTREMITY W/O&W/DYE: CPT | Mod: 26,RT,, | Performed by: RADIOLOGY

## 2024-11-22 RX ORDER — GADOBUTROL 604.72 MG/ML
7 INJECTION INTRAVENOUS
Status: COMPLETED | OUTPATIENT
Start: 2024-11-22 | End: 2024-11-22

## 2024-11-22 RX ADMIN — GADOBUTROL 7 ML: 604.72 INJECTION INTRAVENOUS at 12:11

## 2024-11-23 ENCOUNTER — PATIENT MESSAGE (OUTPATIENT)
Dept: PAIN MEDICINE | Facility: CLINIC | Age: 69
End: 2024-11-23
Payer: MEDICARE

## 2024-11-26 ENCOUNTER — PATIENT MESSAGE (OUTPATIENT)
Dept: ORTHOPEDICS | Facility: CLINIC | Age: 69
End: 2024-11-26
Payer: MEDICARE

## 2024-11-27 ENCOUNTER — OFFICE VISIT (OUTPATIENT)
Dept: PAIN MEDICINE | Facility: CLINIC | Age: 69
End: 2024-11-27
Payer: MEDICARE

## 2024-11-27 DIAGNOSIS — M79.671 RIGHT FOOT PAIN: Primary | ICD-10-CM

## 2024-11-27 PROCEDURE — 99214 OFFICE O/P EST MOD 30 MIN: CPT | Mod: 95,,, | Performed by: STUDENT IN AN ORGANIZED HEALTH CARE EDUCATION/TRAINING PROGRAM

## 2024-11-27 NOTE — PROGRESS NOTES
Chronic Pain - f/u    Referring Physician: No ref. provider found    Date: 11/27/2024     Re: Karlie Carrion  MR#: [id number]  YOB: 1955  Age: 69 y.o.    Chief Complaint:  foot pain  No chief complaint on file.    **This note is dictated using the M*Modal Fluency Direct word recognition program. There are word recognition mistakes that are occasionally missed on review.**    ASSESSMENT: 69 y.o. year old female with right foot and left hip pain, consistent with     1. Right foot pain            PLAN:     Right foot pain  -discussed that I had recommended she go to [facility] for Iovera of mortons neuroma as we do not have the ability to do it at [place].  If MRI shows Gooden's neuroma then I will recommend she go to [facility] to try Iovera.   -mentioned again that I am no expert in foot pain  -MRI foot does not show a mortons neuroma.  Discussed that MRI has a 93% sensitivity rate for mortons neuroma, so it does not mean there isn't one, but it is not very likely. Diagnostic ultrasound would be another option.    -since she is able to manage this, discussed that it is probably better off not trying to do additional things  -Trial compounded cream: diclofenac 5%, gabapentin 6%, bupivacaine 2% @60g - $80ish @ [facility] - this was too expensive for her  -stopped voltaren ointment as alternative - not helpful  -patient wishes to avoid surgery of her foot if possible    Left hip pain  -following with sports med    - RTC PRN    The above plan and management options were discussed at length with patient. Patient is in agreement with the above and verbalized understanding. It will be communicated with the referring physician via electronic record, fax, or mail.  Lab/study reports reviewed were important and necessary because subsequent medical and treatment recommendations required review of the above lab/study reports. Images viewed/reviewed above were important and necessary because subsequent medical and  treatment recommendations required review of the reviewed image(s).     Electronically signed by:  Fam Kang DO  11/27/2024    =========================================================================================================    SUBJECTIVE:    Chronic Pain-Tele-Medicine     The patient location is: Home  The chief complaint leading to consultation is: Pain  Visit type: Virtual visit with synchronous audio and video  Total time spent with patient: 25 min  Each patient to whom he or she provides medical services by telemedicine is:  (1) informed of the relationship between the physician and patient and the respective role of any other health care provider with respect to management of the patient; and (2) notified that he or she may decline to receive medical services by telemedicine and may withdraw from such care at any time.    Interval History 11/27/2024:   Karlie Carrion is a 69 y.o. female presents to the clinic for follow up.  Since last visit the pain has is unchanged. AS long as she is wearing padded shoes she is doing fine.  The patient tried diclofenac ointment which was not helpful.  She did not try the compounded cream because it was too helpful. Patient feels like it swells up. She feels like she has numbness. She thinks the pain at the 2nd/3rd digit is worse and different than the arthritis pain at the 1st toe.    Current pain medications: tylenol, aleve  Failed Pain Medications:  ibuprofen, aleve, lidocaine, oxycodone (never took after surgery)    Initial hx:  Karlie Carrion is a 69 y.o. female presents to the clinic for the evaluation of right foot, hip, and foot pain. The pain started 6-8 years ago following no inciting event and symptoms have been worsening.  The patient states that she had an [facility] foot [facility] which diagnose    She had bone spur surgery on her big toe.  The worst pain is between her 2nd and third toes.  She had an injection with Dr. Strickland which helped for  about 30 minutes. He suspects that she may have a neuroma.     She does not want to do steroids. She does not want to do pain pills.  She thinks that her walking is affecting the hip and causing that to worsen.     Pain Description:    The pain is located in the right knee, right hip, foot area and does not radiate.    At BEST  1/10   At WORST  9/10 on the WORST day.    On average pain is rated as 3/10.   Today the pain is rated as 1/10  The pain is intermittent.  The pain is described as aching and burning    Symptoms interfere with daily activity.   Exacerbating factors: Walking.    Mitigating factors nothing.   She reports 6-7 hours of sleep per night.    Physical Therapy/Home Exercise: Yes, currently has a home exercise program    Current Pain Medications:    - ibuprofen, aleve, lidocaine patch    Failed Pain Medications:    - ibuprofen, aleve, lidocaine, oxycodone (never took after surgery)    Pain Treatment Therapies:    Pain procedures:     Physical Therapy: yes  Chiropractor: none  Acupuncture: none  TENS unit: none  Spinal decompression: none  Joint replacement:   2x knee arthroscopy  Right big toe surgery    Patient denies urinary incontinence and bowel incontinence.  Patient denies any suicidal or homicidal ideations     report:  Reviewed and consistent with medication use as prescribed.    Imaging:   XR foot 10/2023:  For the right foot shows marked narrowing and degenerative change at the 1st MP joint 40 structures are of 5 left foot shows mild narrowing and degenerative change at the 1st MP joint.  Formally structures are otherwise in small plantar calcaneal spur is seen on the 5 left.          No data to display                 Past Medical History:   Diagnosis Date    Centrilobular emphysema 02/28/2023    HLD (hyperlipidemia)     Osteoporosis     Other lesions of oral mucosa 09/28/2023    Positive colorectal cancer screening using Cologuard test 01/04/2023    Stress fracture of right ankle  12/01/2022     Past Surgical History:   Procedure Laterality Date    CHEILECTOMY Right 12/20/2023    Procedure: CHEILECTOMY;  Surgeon: Ian Strickland MD;  Location: [facility];  Service: Orthopedics;  Laterality: Right;    KNEE ARTHROSCOPY Bilateral      Social History     Socioeconomic History    Marital status:    Tobacco Use    Smoking status: Former     Current packs/day: 0.00     Average packs/day: 1.5 packs/day for 25.0 years (37.5 ttl pk-yrs)     Types: Cigarettes     Start date: 1989     Quit date: 2014     Years since quitting: 10.9    Smokeless tobacco: Never   Substance and Sexual Activity    Alcohol use: Not Currently     Comment: don't drink    Drug use: Yes     Types: Marijuana     Comment: daily use, yesterday last use     Social Drivers of Health     Financial Resource Strain: Medium Risk (5/14/2024)    Overall Financial Resource Strain (CARDIA)     Difficulty of Paying Living Expenses: Somewhat hard   Food Insecurity: No Food Insecurity (5/14/2024)    Hunger Vital Sign     Worried About Running Out of Food in the Last Year: Never true     Ran Out of Food in the Last Year: Never true   Transportation Needs: No Transportation Needs (5/14/2024)    PRAPARE - Transportation     Lack of Transportation (Medical): No     Lack of Transportation (Non-Medical): No   Physical Activity: Unknown (5/14/2024)    Exercise Vital Sign     Days of Exercise per Week: 7 days     Minutes of Exercise per Session: Patient unable to answer   Stress: No Stress Concern Present (5/14/2024)    [employer] - Occupational Stress Questionnaire     Feeling of Stress : Only a little   Housing Stability: Low Risk  (5/14/2024)    Housing Stability Vital Sign     Unable to Pay for Housing in the Last Year: No     Homeless in the Last Year: No     Family History   Problem Relation Name Age of Onset    Stroke Mother      No Known Problems Father      Mental illness Sister Mary     Malignant  hypertension Maternal Grandmother      No Known Problems Maternal Grandfather      No Known Problems Paternal Grandmother      No Known Problems Paternal Grandfather      Ovarian cancer Other Tracey         cousin-paternal       Review of patient's allergies indicates:  No Known Allergies    Current Outpatient Medications   Medication Sig    albuterol (VENTOLIN HFA) 90 mcg/actuation inhaler Inhale 2 puffs into the lungs every 6 (six) hours as needed for Wheezing. Rescue    calcium carbonate-vitamin D3 (CALCIUM 600 WITH VITAMIN D3) 600 mg-10 mcg (400 unit) Chew Take by mouth once.    ibuprofen (ADVIL,MOTRIN) 200 MG tablet Take 200 mg by mouth as needed for Pain.     No current facility-administered medications for this visit.       REVIEW OF SYSTEMS:    GENERAL:  No weight loss, malaise or fevers.:+fever  HEENT:   No recent changes in vision or hearing:NO  NECK:  Negative for lumps, no difficulty with swallowing.:NO  RESPIRATORY:  Negative for cough, wheezing or shortness of breath, patient denies any recent URI.:+COPD  CARDIOVASCULAR:  Negative for chest pain, leg swelling or palpitations.:NO  GI:  Negative for abdominal discomfort, blood in stools or black stools or change in bowel habits.:NO  MUSCULOSKELETAL:  See HPI.  SKIN:  Negative for lesions, rash, and itching.:NO  PSYCH:  No mood disorder or recent psychosocial stressors.  Patients sleep is not disturbed secondary to pain.:NO  HEMATOLOGY/LYMPHOLOGY:  Negative for prolonged bleeding, bruising easily or swollen nodes.  Patient is not currently taking any anti-coagulants:NO  NEURO:   No history of headaches, syncope, paralysis, seizures or tremors.:NO  All other reviewed and negative other than HPI.    OBJECTIVE:    There were no vitals taken for this visit.    PHYSICAL EXAMINATION:  Virtual visit  GENERAL: Well appearing, in no acute distress, alert and oriented x3.  PSYCH:  Mood and affect appropriate.  SKIN: Skin color, texture, turgor normal, no rashes or  lesions.  HEAD/FACE:  Normocephalic, atraumatic. Cranial nerves grossly intact.    Prior in-person visit  CV: RRR with palpation of the radial artery.  PULM: CTAB. No evidence of respiratory difficulty, symmetric chest rise.  GI:  Soft and non-tender.    BACK:   - No obvious deformity or signs of trauma, Normal lumbar lordotic curve  - Negative spinous process tenderness  - Negative paravertebral tenderness  - Negative pain to palpation over the facet joints of the lumbar spine.   - Negative QL / Iliac crest / Glut tenderness  - Slump test is Negative for radicular pain  - Slump test is Negative for back pain    MUSCULOSKELETAL:    Right foot:  No swelling  No erythema  Mild TTP at the base of 2nd/3rd digit  Good ROM of the ankle    NEURO: Bilateral upper and lower extremity coordination and muscle stretch reflexes are physiologic and symmetric.      NEUROLOGICAL EXAM:  MENTAL STATUS: A x O x 3, good concentration, speech is fluent and goal directed  MEMORY: recent and remote are intact  CN: CN2-12 grossly intact  MOTOR: 5/5 in all muscle groups  DTRs: 2+ intact symmetric  Sensation:    -no Loss of sensation in a right lower  foot  distribution.

## 2024-12-09 ENCOUNTER — PATIENT MESSAGE (OUTPATIENT)
Dept: PRIMARY CARE CLINIC | Facility: CLINIC | Age: 69
End: 2024-12-09
Payer: MEDICARE

## 2024-12-09 DIAGNOSIS — U07.1 COVID-19: ICD-10-CM

## 2024-12-09 RX ORDER — ALBUTEROL SULFATE 90 UG/1
2 INHALANT RESPIRATORY (INHALATION) EVERY 6 HOURS PRN
Qty: 18 G | Refills: 0 | Status: SHIPPED | OUTPATIENT
Start: 2024-12-09

## 2024-12-09 NOTE — TELEPHONE ENCOUNTER
Care Due:                  Date            Visit Type   Department     Provider  --------------------------------------------------------------------------------                                MYCHART                              FOLLOWUP/OF  [facility]  Last Visit: 09-      [facility] VISIT   CARE           Kasia Acosta  Next Visit: None Scheduled  None         None Found                                                            Last  Test          Frequency    Reason                     Performed    Due Date  --------------------------------------------------------------------------------    Office Visit  15 months..  albuterol................  09- 12-    [facility] Embedded Care Due Messages. Reference number: [id number].   12/09/2024 11:17:00 AM CST

## 2024-12-30 ENCOUNTER — HOSPITAL ENCOUNTER (OUTPATIENT)
Dept: RADIOLOGY | Facility: HOSPITAL | Age: 69
Discharge: HOME | End: 2024-12-30
Attending: STUDENT IN AN ORGANIZED HEALTH CARE EDUCATION/TRAINING PROGRAM
Payer: MEDICARE

## 2024-12-30 DIAGNOSIS — Z12.31 ENCOUNTER FOR SCREENING MAMMOGRAM FOR BREAST CANCER: ICD-10-CM

## 2024-12-30 PROCEDURE — 77067 SCR MAMMO BI INCL CAD: CPT | Mod: 26,,, | Performed by: RADIOLOGY

## 2024-12-30 PROCEDURE — 77063 BREAST TOMOSYNTHESIS BI: CPT | Mod: 26,,, | Performed by: RADIOLOGY

## 2024-12-30 PROCEDURE — 77067 SCR MAMMO BI INCL CAD: CPT | Mod: TC,PO

## 2025-01-20 ENCOUNTER — PATIENT MESSAGE (OUTPATIENT)
Dept: ADMINISTRATIVE | Facility: HOSPITAL | Age: 70
End: 2025-01-20
Payer: MEDICARE

## 2025-01-21 ENCOUNTER — PATIENT OUTREACH (OUTPATIENT)
Dept: ADMINISTRATIVE | Facility: HOSPITAL | Age: 70
End: 2025-01-21
Payer: MEDICARE

## 2025-01-21 DIAGNOSIS — Z78.0 MENOPAUSE: Primary | ICD-10-CM

## 2025-01-21 NOTE — PROGRESS NOTES
Dexa overdue and ordered  , care everywhere, immunizations updated  Chart review complete  Health Maintenance Due   Topic Date Due    RSV Vaccine (Age 60+ and Pregnant patients) (1 - Risk 60-74 years 1-dose series) Never done    Hemoglobin A1c (Prediabetes)  12/06/2023    DEXA Scan  12/13/2024

## 2025-01-22 ENCOUNTER — PATIENT MESSAGE (OUTPATIENT)
Dept: ORTHOPEDICS | Facility: CLINIC | Age: 70
End: 2025-01-22
Payer: MEDICARE

## 2025-02-04 ENCOUNTER — HOSPITAL ENCOUNTER (OUTPATIENT)
Dept: RADIOLOGY | Facility: HOSPITAL | Age: 70
Discharge: HOME | End: 2025-02-04
Attending: STUDENT IN AN ORGANIZED HEALTH CARE EDUCATION/TRAINING PROGRAM
Payer: MEDICARE

## 2025-02-04 DIAGNOSIS — Z78.0 MENOPAUSE: ICD-10-CM

## 2025-02-04 PROCEDURE — 77080 DXA BONE DENSITY AXIAL: CPT | Mod: TC

## 2025-02-04 PROCEDURE — 77080 DXA BONE DENSITY AXIAL: CPT | Mod: 26,ICN,, | Performed by: INTERNAL MEDICINE

## 2025-02-14 ENCOUNTER — PATIENT MESSAGE (OUTPATIENT)
Dept: PRIMARY CARE CLINIC | Facility: CLINIC | Age: 70
End: 2025-02-14
Payer: MEDICARE

## 2025-03-07 ENCOUNTER — PATIENT MESSAGE (OUTPATIENT)
Dept: PRIMARY CARE CLINIC | Facility: CLINIC | Age: 70
End: 2025-03-07
Payer: MEDICARE

## 2025-04-18 ENCOUNTER — PATIENT MESSAGE (OUTPATIENT)
Dept: SPORTS MEDICINE | Facility: CLINIC | Age: 70
End: 2025-04-18
Payer: MEDICARE

## 2025-04-18 DIAGNOSIS — M17.11 PRIMARY OSTEOARTHRITIS OF RIGHT KNEE: Primary | ICD-10-CM

## 2025-04-21 ENCOUNTER — TELEPHONE (OUTPATIENT)
Dept: SPORTS MEDICINE | Facility: CLINIC | Age: 70
End: 2025-04-21
Payer: MEDICARE

## 2025-04-21 NOTE — TELEPHONE ENCOUNTER
Called patient back to schedule R knee Euflexxa series. Pt requested 10:30 appointments and agreed to pushing back start date of series in order to be seen at these times. Pt agreed with treatment plan.    
Statement Selected

## 2025-04-21 NOTE — TELEPHONE ENCOUNTER
Called and left vm informing patient to call back when she gets a chance to schedule Right knee Euflexxa series with Garland Milner starting after 5/20/2025

## 2025-04-28 DIAGNOSIS — Z00.00 ENCOUNTER FOR MEDICARE ANNUAL WELLNESS EXAM: ICD-10-CM

## 2025-05-01 ENCOUNTER — PATIENT MESSAGE (OUTPATIENT)
Dept: PODIATRY | Facility: CLINIC | Age: 70
End: 2025-05-01
Payer: MEDICARE

## 2025-05-05 ENCOUNTER — OFFICE VISIT (OUTPATIENT)
Dept: PODIATRY | Facility: CLINIC | Age: 70
End: 2025-05-05
Payer: MEDICARE

## 2025-05-05 VITALS
HEIGHT: 61 IN | DIASTOLIC BLOOD PRESSURE: 78 MMHG | SYSTOLIC BLOOD PRESSURE: 140 MMHG | HEART RATE: 63 BPM | WEIGHT: 139.56 LBS | BODY MASS INDEX: 26.35 KG/M2

## 2025-05-05 DIAGNOSIS — Z98.890 H/O FOOT SURGERY: ICD-10-CM

## 2025-05-05 DIAGNOSIS — G57.91 NEURITIS OF RIGHT FOOT: ICD-10-CM

## 2025-05-05 DIAGNOSIS — G60.9 IDIOPATHIC PERIPHERAL NEUROPATHY: Primary | ICD-10-CM

## 2025-05-05 PROCEDURE — 99204 OFFICE O/P NEW MOD 45 MIN: CPT | Mod: S$PBB,,, | Performed by: PODIATRIST

## 2025-05-05 PROCEDURE — 99999 PR PBB SHADOW E&M-EST. PATIENT-LVL III: CPT | Mod: PBBFAC,,, | Performed by: PODIATRIST

## 2025-05-05 PROCEDURE — 99213 OFFICE O/P EST LOW 20 MIN: CPT | Mod: PBBFAC,PN | Performed by: PODIATRIST

## 2025-05-05 RX ORDER — GABAPENTIN 300 MG/1
300 CAPSULE ORAL NIGHTLY
Qty: 30 CAPSULE | Refills: 0 | Status: SHIPPED | OUTPATIENT
Start: 2025-05-05 | End: 2025-06-04

## 2025-05-05 NOTE — PROGRESS NOTES
Subjective:      Patient ID: Karlie Carrion is a 69 y.o. female.    Chief Complaint:   Foot Pain (Left, using orthotics, thicker socks are more comfortable, can walk for about a mile before bothersome, sometimes uses diclofenac sodium/aleve/tylenol)    Karlie is a 69 y.o. female who presents to the podiatry clinic  with complaint of  right foot pain.  Patient relates she was told she could have a neuroma even though it did not show up on MRI    Patient here for another opinion    She has been seeing ortho for left foot pain.     Patient also has noted right knee arthritis that has been receiving injections, left hip pain followed by sports Medicine.    Dr. Marco A graves had referred patient to pain management 10/31/24 for options of ultrasound-guided injections and therapy.      1/8/24 right big toe cheilectomy        Patient relates she smashed her foot about 3 years ago with an iron stool  She had no insurance at the time did not have it checked out  This is before her foot surgery    She notices her pain is constant  Water aerobics but her foot burns hurts feels swollen  She likes to walk but her hip hurts    No history of taking gabapentin    Past Medical History:   Diagnosis Date    Centrilobular emphysema 02/28/2023    HLD (hyperlipidemia)     Osteoporosis     Other lesions of oral mucosa 09/28/2023    Positive colorectal cancer screening using Cologuard test 01/04/2023    Stress fracture of right ankle 12/01/2022     Past Surgical History:   Procedure Laterality Date    CHEILECTOMY Right 12/20/2023    Procedure: CHEILECTOMY;  Surgeon: Ian Strickland MD;  Location: [facility];  Service: Orthopedics;  Laterality: Right;    KNEE ARTHROSCOPY Bilateral      Medications Ordered Prior to Encounter[1]  Review of patient's allergies indicates:  No Known Allergies    Review of Systems   Constitutional: Negative for chills, decreased appetite, fever, malaise/fatigue, night sweats, weight gain and weight loss.  "  Cardiovascular:  Negative for chest pain, claudication, dyspnea on exertion, leg swelling, palpitations and syncope.   Respiratory:  Negative for cough and shortness of breath.    Endocrine: Negative for cold intolerance and heat intolerance.   Hematologic/Lymphatic: Negative for bleeding problem. Does not bruise/bleed easily.   Skin:  Negative for color change, dry skin, flushing, itching, nail changes, poor wound healing, rash, skin cancer, suspicious lesions and unusual hair distribution.   Musculoskeletal:  Positive for joint pain. Negative for arthritis, back pain, falls, gout, joint swelling, muscle cramps, muscle weakness, myalgias, neck pain and stiffness.        Foot pain   Gastrointestinal:  Negative for diarrhea, nausea and vomiting.   Neurological:  Negative for dizziness, focal weakness, light-headedness, numbness, paresthesias, tremors, vertigo and weakness.   Psychiatric/Behavioral:  Negative for altered mental status and depression. The patient does not have insomnia.    Allergic/Immunologic: Negative.            Objective:       Vitals:    05/05/25 1333   BP: (!) 140/78   Pulse: 63   Weight: 63.3 kg (139 lb 8.8 oz)   Height: 5' 1" (1.549 m)   PainSc:   3   PainLoc: Foot   63.3 kg (139 lb 8.8 oz)     Physical Exam  Vitals reviewed.   Constitutional:       General: She is not in acute distress.     Appearance: She is well-developed. She is not ill-appearing, toxic-appearing or diaphoretic.      Comments: Proper supportive shoegear      Cardiovascular:      Pulses:           Dorsalis pedis pulses are 2+ on the right side and 2+ on the left side.        Posterior tibial pulses are 2+ on the right side and 2+ on the left side.   Musculoskeletal:         General: No tenderness or deformity.      Right lower leg: No edema.      Left lower leg: No edema.      Right ankle: Normal.      Right Achilles Tendon: Normal.      Left ankle: Normal.      Left Achilles Tendon: Normal.      Right foot: Decreased " range of motion. No deformity.      Left foot: Decreased range of motion. No deformity.      Comments: Mild diffuse pain on palpation right forefoot 1st metatarsophalangeal joint range of motion    Strength 5/5 bilateral   Feet:      Right foot:      Protective Sensation: 10 sites tested.  5 sites sensed.      Skin integrity: No ulcer, blister, skin breakdown, erythema, warmth, callus or dry skin.      Toenail Condition: Right toenails are normal.      Left foot:      Protective Sensation: 10 sites tested.  10 sites sensed.      Skin integrity: No ulcer, blister, skin breakdown, erythema, warmth, callus or dry skin.      Toenail Condition: Left toenails are normal.      Comments: Right decreased sensation digits dorsal right hypersensitivity    No open lesions or signs of infection  Skin:     General: Skin is warm.      Capillary Refill: Capillary refill takes 2 to 3 seconds.      Coloration: Skin is not pale.      Findings: No erythema or rash.   Neurological:      Mental Status: She is alert and oriented to person, place, and time.      Gait: Gait is intact.   Psychiatric:         Attention and Perception: Attention normal.         Mood and Affect: Mood normal.         Speech: Speech normal.         Behavior: Behavior normal.         Thought Content: Thought content normal.         Cognition and Memory: Cognition normal.         Judgment: Judgment normal.           Narrative & Impression  EXAMINATION:  MRI FOOT (FOREFOOT) RIGHT W W/O CONTRAST     CLINICAL HISTORY:  mortons neuroma between 2nd and 3rd digit suspected;  Lesion of plantar nerve, right lower limb     TECHNIQUE:  Multiplanar, multisequence MR imaging of the  forefoot with and without the use of intravenous gadolinium IV contrast (7 cc Gadavist).     COMPARISON:  None     FINDINGS:  Lisfranc Ligament: Intact.     Alignment: Normal.     Bones: No fracture or stress reaction. No evidence of a marrow replacement process.     Tendons: Extensor tendons of  "the toes are normal in signal characteristics. Flexor tendons of the toes are normal in signal characteristics.     Hallux MTP-Sesamoid Complex: MTP joint space narrowing with subchondral edema/cystic change degenerative in nature.     2-5 MTP Joints: Normal. No evidence of effusion, synovitis, OCD, OA or dorsal osteophytes.     Soft Tissues: No edema or other abnormality.     Muscles: Normal in volume and signal characteristics.     Vascular: Normal.     Nerves: Normal.     Miscellaneous: No evidence of Gooden's neuroma.Ganglion cyst arising at dorsum of base of 4th metatarsal.      Impression:     Degenerative changes LEFT 1st metatarsophalangeal joint.     Ganglion cyst dorsum of base of 4th metatarsal, 23 x 9 x 6 mm     No evident Gooden's neuroma        Electronically signed by:Gregor Dinero MD  Date:                                            11/22/2024  Time:                                           14:10      Assessment:       Encounter Diagnoses   Name Primary?    Idiopathic peripheral neuropathy Yes    Neuritis of right foot     H/O foot surgery          Plan:       Karlie Jacobson" was seen today for foot pain.    Diagnoses and all orders for this visit:    Idiopathic peripheral neuropathy  -     Ambulatory Referral/Consult to Physical Therapy; Future  -     Ambulatory referral/consult to Neurology; Future    Neuritis of right foot  -     Ambulatory Referral/Consult to Physical Therapy; Future  -     Ambulatory referral/consult to Neurology; Future    H/O foot surgery  -     Ambulatory Referral/Consult to Physical Therapy; Future  -     Ambulatory referral/consult to Neurology; Future    Other orders  -     gabapentin (NEURONTIN) 300 MG capsule; Take 1 capsule (300 mg total) by mouth every evening.      I counseled the patient on her conditions, their implications and medical management.    Complex history including injury and surgery    Vascular  intact.  No indication for workup at this time       If MRI " is negative for neuroma this is likely neuritis stemming from a higher up etiology such as knee or hip/back patient does have both knee and hip issues that have been treated    Also concern for ddx crps. Hx 3 years ago foot injury    Recommend nerve conduction velocity testing  Will ask pain management if can order... if not will consult neurology    Recommend trial of gabapentin discussed side effects possible  .  Also start physical therapy    Pair and         [1]   Current Outpatient Medications on File Prior to Visit   Medication Sig Dispense Refill    albuterol (VENTOLIN HFA) 90 mcg/actuation inhaler Inhale 2 puffs into the lungs every 6 (six) hours as needed for Wheezing. Rescue 18 g 0    calcium carbonate-vitamin D3 (CALCIUM 600 WITH VITAMIN D3) 600 mg-10 mcg (400 unit) Chew Take by mouth once.      ibuprofen (ADVIL,MOTRIN) 200 MG tablet Take 200 mg by mouth as needed for Pain.       No current facility-administered medications on file prior to visit.

## 2025-05-06 ENCOUNTER — TELEPHONE (OUTPATIENT)
Dept: PHYSICAL MEDICINE AND REHAB | Facility: CLINIC | Age: 70
End: 2025-05-06
Payer: MEDICARE

## 2025-05-06 DIAGNOSIS — G60.9 IDIOPATHIC PERIPHERAL NEUROPATHY: Primary | ICD-10-CM

## 2025-05-06 NOTE — TELEPHONE ENCOUNTER
----- Message from Fam Kang DO sent at 5/6/2025  3:35 PM CDT -----  Regarding: schedule EMG  Could you please schedule this EMG with dr. Maldonado?  Thank you

## 2025-05-06 NOTE — TELEPHONE ENCOUNTER
Spoke with patient in regards to scheduling EMG appointment with Dr. Maldonado. Patient was notified her appointment was schedule for 06/13@10:00 am. Patient express understanding.

## 2025-05-09 ENCOUNTER — PATIENT MESSAGE (OUTPATIENT)
Dept: PODIATRY | Facility: CLINIC | Age: 70
End: 2025-05-09
Payer: MEDICARE

## 2025-05-13 ENCOUNTER — OFFICE VISIT (OUTPATIENT)
Dept: PRIMARY CARE CLINIC | Facility: CLINIC | Age: 70
End: 2025-05-13
Payer: MEDICARE

## 2025-05-13 ENCOUNTER — LAB VISIT (OUTPATIENT)
Dept: LAB | Facility: HOSPITAL | Age: 70
End: 2025-05-13
Attending: STUDENT IN AN ORGANIZED HEALTH CARE EDUCATION/TRAINING PROGRAM
Payer: MEDICARE

## 2025-05-13 ENCOUNTER — PATIENT MESSAGE (OUTPATIENT)
Dept: PRIMARY CARE CLINIC | Facility: CLINIC | Age: 70
End: 2025-05-13

## 2025-05-13 VITALS
HEART RATE: 63 BPM | WEIGHT: 141.13 LBS | BODY MASS INDEX: 26.65 KG/M2 | HEIGHT: 61 IN | OXYGEN SATURATION: 97 % | SYSTOLIC BLOOD PRESSURE: 138 MMHG | DIASTOLIC BLOOD PRESSURE: 60 MMHG

## 2025-05-13 DIAGNOSIS — Z79.1 ENCOUNTER FOR MONITORING NSAID THERAPY: ICD-10-CM

## 2025-05-13 DIAGNOSIS — M79.10 MYALGIA DUE TO STATIN: ICD-10-CM

## 2025-05-13 DIAGNOSIS — Z79.899 OTHER LONG TERM (CURRENT) DRUG THERAPY: ICD-10-CM

## 2025-05-13 DIAGNOSIS — M81.0 AGE RELATED OSTEOPOROSIS, UNSPECIFIED PATHOLOGICAL FRACTURE PRESENCE: ICD-10-CM

## 2025-05-13 DIAGNOSIS — G89.29 CHRONIC PAIN OF RIGHT KNEE: ICD-10-CM

## 2025-05-13 DIAGNOSIS — Z87.891 FORMER SMOKER: ICD-10-CM

## 2025-05-13 DIAGNOSIS — I70.0 AORTIC ATHEROSCLEROSIS: ICD-10-CM

## 2025-05-13 DIAGNOSIS — Z51.81 ENCOUNTER FOR MONITORING NSAID THERAPY: ICD-10-CM

## 2025-05-13 DIAGNOSIS — E78.2 MIXED HYPERLIPIDEMIA: ICD-10-CM

## 2025-05-13 DIAGNOSIS — J43.2 CENTRILOBULAR EMPHYSEMA: ICD-10-CM

## 2025-05-13 DIAGNOSIS — I10 PRIMARY HYPERTENSION: ICD-10-CM

## 2025-05-13 DIAGNOSIS — M70.72 BURSITIS OF LEFT HIP, UNSPECIFIED BURSA: ICD-10-CM

## 2025-05-13 DIAGNOSIS — M25.561 CHRONIC PAIN OF RIGHT KNEE: ICD-10-CM

## 2025-05-13 DIAGNOSIS — M79.671 RIGHT FOOT PAIN: ICD-10-CM

## 2025-05-13 DIAGNOSIS — R91.1 NODULE OF LOWER LOBE OF RIGHT LUNG: ICD-10-CM

## 2025-05-13 DIAGNOSIS — Z00.00 ANNUAL PHYSICAL EXAM: Primary | ICD-10-CM

## 2025-05-13 DIAGNOSIS — T46.6X5A MYALGIA DUE TO STATIN: ICD-10-CM

## 2025-05-13 DIAGNOSIS — Z00.00 ANNUAL PHYSICAL EXAM: ICD-10-CM

## 2025-05-13 DIAGNOSIS — R73.03 PREDIABETES: ICD-10-CM

## 2025-05-13 DIAGNOSIS — G62.9 NEUROPATHY: ICD-10-CM

## 2025-05-13 DIAGNOSIS — R79.9 ABNORMAL FINDING OF BLOOD CHEMISTRY, UNSPECIFIED: ICD-10-CM

## 2025-05-13 LAB
25(OH)D3+25(OH)D2 SERPL-MCNC: 36 NG/ML (ref 30–96)
ABSOLUTE EOSINOPHIL (OHS): 0.11 K/UL
ABSOLUTE MONOCYTE (OHS): 0.75 K/UL (ref 0.3–1)
ABSOLUTE NEUTROPHIL COUNT (OHS): 5.39 K/UL (ref 1.8–7.7)
ALBUMIN SERPL BCP-MCNC: 3.7 G/DL (ref 3.5–5.2)
ALP SERPL-CCNC: 79 UNIT/L (ref 40–150)
ALT SERPL W/O P-5'-P-CCNC: 22 UNIT/L (ref 10–44)
ANION GAP (OHS): 8 MMOL/L (ref 8–16)
AST SERPL-CCNC: 24 UNIT/L (ref 11–45)
BASOPHILS # BLD AUTO: 0.09 K/UL
BASOPHILS NFR BLD AUTO: 1 %
BILIRUB SERPL-MCNC: 0.4 MG/DL (ref 0.1–1)
BUN SERPL-MCNC: 20 MG/DL (ref 8–23)
CALCIUM SERPL-MCNC: 9.5 MG/DL (ref 8.7–10.5)
CHLORIDE SERPL-SCNC: 106 MMOL/L (ref 95–110)
CHOLEST SERPL-MCNC: 252 MG/DL (ref 120–199)
CHOLEST/HDLC SERPL: 6 {RATIO} (ref 2–5)
CO2 SERPL-SCNC: 28 MMOL/L (ref 23–29)
CREAT SERPL-MCNC: 0.9 MG/DL (ref 0.5–1.4)
EAG (OHS): 108 MG/DL (ref 68–131)
ERYTHROCYTE [DISTWIDTH] IN BLOOD BY AUTOMATED COUNT: 12 % (ref 11.5–14.5)
FERRITIN SERPL-MCNC: 211 NG/ML (ref 20–300)
GFR SERPLBLD CREATININE-BSD FMLA CKD-EPI: >60 ML/MIN/1.73/M2
GLUCOSE SERPL-MCNC: 95 MG/DL (ref 70–110)
HBA1C MFR BLD: 5.4 % (ref 4–5.6)
HCT VFR BLD AUTO: 46.4 % (ref 37–48.5)
HDLC SERPL-MCNC: 42 MG/DL (ref 40–75)
HDLC SERPL: 16.7 % (ref 20–50)
HGB BLD-MCNC: 14.7 GM/DL (ref 12–16)
IMM GRANULOCYTES # BLD AUTO: 0.03 K/UL (ref 0–0.04)
IMM GRANULOCYTES NFR BLD AUTO: 0.3 % (ref 0–0.5)
INDIRECT COOMBS: NORMAL
LDLC SERPL CALC-MCNC: 153.6 MG/DL (ref 63–159)
LYMPHOCYTES # BLD AUTO: 3.04 K/UL (ref 1–4.8)
MCH RBC QN AUTO: 29.7 PG (ref 27–31)
MCHC RBC AUTO-ENTMCNC: 31.7 G/DL (ref 32–36)
MCV RBC AUTO: 94 FL (ref 82–98)
NONHDLC SERPL-MCNC: 210 MG/DL
NUCLEATED RBC (/100WBC) (OHS): 0 /100 WBC
PLATELET # BLD AUTO: 250 K/UL (ref 150–450)
PMV BLD AUTO: 12.6 FL (ref 9.2–12.9)
POTASSIUM SERPL-SCNC: 4.5 MMOL/L (ref 3.5–5.1)
PROT SERPL-MCNC: 7 GM/DL (ref 6–8.4)
RBC # BLD AUTO: 4.95 M/UL (ref 4–5.4)
RELATIVE EOSINOPHIL (OHS): 1.2 %
RELATIVE LYMPHOCYTE (OHS): 32.3 % (ref 18–48)
RELATIVE MONOCYTE (OHS): 8 % (ref 4–15)
RELATIVE NEUTROPHIL (OHS): 57.2 % (ref 38–73)
RH BLD: NORMAL
SODIUM SERPL-SCNC: 142 MMOL/L (ref 136–145)
SPECIMEN OUTDATE: NORMAL
TRIGL SERPL-MCNC: 282 MG/DL (ref 30–150)
TSH SERPL-ACNC: 2.07 UIU/ML (ref 0.4–4)
VIT B12 SERPL-MCNC: 443 PG/ML (ref 210–950)
WBC # BLD AUTO: 9.41 K/UL (ref 3.9–12.7)

## 2025-05-13 PROCEDURE — 36415 COLL VENOUS BLD VENIPUNCTURE: CPT | Mod: PN

## 2025-05-13 PROCEDURE — 85025 COMPLETE CBC W/AUTO DIFF WBC: CPT

## 2025-05-13 PROCEDURE — 99999 PR PBB SHADOW E&M-EST. PATIENT-LVL IV: CPT | Mod: PBBFAC,,, | Performed by: STUDENT IN AN ORGANIZED HEALTH CARE EDUCATION/TRAINING PROGRAM

## 2025-05-13 PROCEDURE — 99214 OFFICE O/P EST MOD 30 MIN: CPT | Mod: PBBFAC,PN,25 | Performed by: STUDENT IN AN ORGANIZED HEALTH CARE EDUCATION/TRAINING PROGRAM

## 2025-05-13 PROCEDURE — 83036 HEMOGLOBIN GLYCOSYLATED A1C: CPT

## 2025-05-13 PROCEDURE — 86762 RUBELLA ANTIBODY: CPT

## 2025-05-13 PROCEDURE — 80061 LIPID PANEL: CPT

## 2025-05-13 PROCEDURE — 80053 COMPREHEN METABOLIC PANEL: CPT

## 2025-05-13 PROCEDURE — 82728 ASSAY OF FERRITIN: CPT

## 2025-05-13 PROCEDURE — 86735 MUMPS ANTIBODY: CPT

## 2025-05-13 PROCEDURE — 82607 VITAMIN B-12: CPT

## 2025-05-13 PROCEDURE — 86765 RUBEOLA ANTIBODY: CPT

## 2025-05-13 PROCEDURE — 86901 BLOOD TYPING SEROLOGIC RH(D): CPT | Performed by: STUDENT IN AN ORGANIZED HEALTH CARE EDUCATION/TRAINING PROGRAM

## 2025-05-13 PROCEDURE — 84443 ASSAY THYROID STIM HORMONE: CPT

## 2025-05-13 PROCEDURE — 82306 VITAMIN D 25 HYDROXY: CPT

## 2025-05-13 RX ORDER — GABAPENTIN 300 MG/1
300 CAPSULE ORAL NIGHTLY
Qty: 30 CAPSULE | Refills: 0 | Status: SHIPPED | OUTPATIENT
Start: 2025-05-13 | End: 2025-06-12

## 2025-05-13 RX ORDER — ALBUTEROL SULFATE 90 UG/1
2 INHALANT RESPIRATORY (INHALATION) EVERY 6 HOURS PRN
Qty: 18 G | Refills: 0 | Status: SHIPPED | OUTPATIENT
Start: 2025-05-13

## 2025-05-13 NOTE — PROGRESS NOTES
"Karlie Carrion  1955        Subjective     Chief Complaint: Annual    History of Present Illness:  Ms. Karlie Carrion is a 69 y.o. female who presents to clinic for annual. Last seen 2023.    Possible Gooden's Neuroma- seeing Ortho, Podiatry, and Pain Management.  Had surgery but still in some pain. Right foot. Had MRI.  Has EMG ordered. PT ordered. On Gabapentin which is helping.    Lab Results   Component Value Date    HGBA1C 5.3 12/06/2022       Right knee- planning on doing injections.  Considering TKR in future if needed. S/p PT.    HTN- high recently. Very stressed.  Smoking THC daily.   No cigarettes.  Takes Ibuprofen 3x/weekly.    BP Readings from Last 5 Encounters:   05/13/25 138/60   05/05/25 (!) 140/78   11/12/24 (!) 161/68   11/05/24 139/82   10/31/24 (!) 160/76     COPD-noted on CT scans 2023.  +lung nodule  Former smoker, years ago. Quit.    Aortic atherosclerosis- noted on CT scan 2023.  Hx of HLD. Intolerant to statins. Tried a few days. Declines re-trying. Working on diet.  From my last note, "Has atorvastatin 40, not yet taken. In the past she has had diarrhea with statins. Feels it flared her IBS."    Osteoporosis- declines meds for now.  Hx of hip bursitis. Seen in 2023 w/ Dr. Sánchez. Sent to PT.     The 10-year ASCVD risk score (Colt SHAFFER, et al., 2019) is: 9.4%    Values used to calculate the score:      Age: 69 years      Sex: Female      Is Non- : No      Diabetic: No      Tobacco smoker: No      Systolic Blood Pressure: 138 mmHg      Is BP treated: No      HDL Cholesterol: 45 mg/dL      Total Cholesterol: 255 mg/dL     Saw ENT for mucosal lesion. CT soft tissue done in 2023. F/u prn per notes. Pt reports improved.  S/p bx--> path neg.  Final Pathologic Diagnosis &quot;L oropharynx&quot;, biopsy:      - Squamous mucosa with lymphoid aggregate      - No dysplasia or carcinoma seen     Due for shingles, RSV vaccine.   Interested in MMR titers.     Review of Systems "   Constitutional:  Positive for malaise/fatigue. Negative for chills and fever.   Musculoskeletal:  Positive for joint pain.   Neurological:  Negative for sensory change, speech change and focal weakness.   Psychiatric/Behavioral:  The patient is not nervous/anxious.         PAST HISTORY:     Past Medical History:   Diagnosis Date    Centrilobular emphysema 02/28/2023    HLD (hyperlipidemia)     Osteoporosis     Other lesions of oral mucosa 09/28/2023    Positive colorectal cancer screening using Cologuard test 01/04/2023    Stress fracture of right ankle 12/01/2022       Past Surgical History:   Procedure Laterality Date    CHEILECTOMY Right 12/20/2023    Procedure: CHEILECTOMY;  Surgeon: Ian Strickland MD;  Location: [facility];  Service: Orthopedics;  Laterality: Right;    KNEE ARTHROSCOPY Bilateral        Family History   Problem Relation Name Age of Onset    Stroke Mother      No Known Problems Father      Mental illness Sister Mary     Malignant hypertension Maternal Grandmother      No Known Problems Maternal Grandfather      No Known Problems Paternal Grandmother      No Known Problems Paternal Grandfather      Ovarian cancer Other Tracey         cousin-paternal         MEDICATIONS & ALLERGIES:     Current Outpatient Medications on File Prior to Visit   Medication Sig    albuterol (VENTOLIN HFA) 90 mcg/actuation inhaler Inhale 2 puffs into the lungs every 6 (six) hours as needed for Wheezing. Rescue    calcium carbonate-vitamin D3 (CALCIUM 600 WITH VITAMIN D3) 600 mg-10 mcg (400 unit) Chew Take by mouth once.    gabapentin (NEURONTIN) 300 MG capsule Take 1 capsule (300 mg total) by mouth every evening.    ibuprofen (ADVIL,MOTRIN) 200 MG tablet Take 200 mg by mouth as needed for Pain.     No current facility-administered medications on file prior to visit.       Review of patient's allergies indicates:  No Known Allergies    OBJECTIVE:     Vital Signs:  Vitals:    05/13/25 1037 05/13/25 1119   BP: (!)  "146/78 138/60   BP Location: Left arm    Patient Position: Sitting    Pulse: 63    SpO2: 97%    Weight: 64 kg (141 lb 1.5 oz)    Height: 5' 1" (1.549 m)        Body mass index is 26.66 kg/m².     Physical Exam:  Physical Exam  Vitals and nursing note reviewed.   Constitutional:       General: She is not in acute distress.     Appearance: Normal appearance. She is not ill-appearing, toxic-appearing or diaphoretic.   HENT:      Head: Normocephalic and atraumatic.      Mouth/Throat:      Mouth: Mucous membranes are moist.      Pharynx: No oropharyngeal exudate or posterior oropharyngeal erythema.      Comments: No oral lesions  Eyes:      General: No scleral icterus.        Right eye: No discharge.         Left eye: No discharge.      Conjunctiva/sclera: Conjunctivae normal.   Cardiovascular:      Rate and Rhythm: Normal rate and regular rhythm.      Heart sounds: No murmur heard.  Pulmonary:      Effort: Pulmonary effort is normal. No respiratory distress.      Breath sounds: Normal breath sounds. No stridor. No wheezing, rhonchi or rales.   Musculoskeletal:         General: Normal range of motion.      Cervical back: Normal range of motion and neck supple. No rigidity.      Right lower leg: No edema.      Left lower leg: No edema.   Lymphadenopathy:      Cervical: No cervical adenopathy.   Skin:     General: Skin is warm and dry.   Neurological:      Mental Status: She is alert and oriented to person, place, and time. Mental status is at baseline.      Gait: Gait normal.   Psychiatric:         Mood and Affect: Mood normal.         Behavior: Behavior normal.            Laboratory  Lab Results   Component Value Date    GLU 85 09/28/2023     09/28/2023    K 4.0 09/28/2023     09/28/2023    CO2 27 09/28/2023    BUN 16 09/28/2023    CREATININE 0.8 09/28/2023    CALCIUM 9.5 09/28/2023     Lab Results   Component Value Date    HGBA1C 5.3 12/06/2022     No results for input(s): "POCTGLUCOSE" in the last 72 " hours.        ASSESSMENT & PLAN:   Ms. Karlie Carrion is a 69 y.o. female who was seen today in clinic for annual and follow-up.    1. Annual physical exam  -     CBC Auto Differential; Future; Expected date: 05/13/2025  -     Comprehensive Metabolic Panel; Future; Expected date: 05/13/2025  -     Hemoglobin A1C; Future; Expected date: 05/13/2025  -     Lipid Panel; Future; Expected date: 05/13/2025  -     TSH; Future; Expected date: 05/13/2025  -     Vitamin D; Future; Expected date: 05/13/2025  -     Mumps, IgG Screen; Future; Expected date: 05/13/2025  -     Rubeola antibody IgG; Future; Expected date: 05/13/2025  -     Rubella antibody, IgG; Future; Expected date: 05/13/2025  -     Type & Screen; Future; Expected date: 05/13/2025    2. Bursitis of left hip, unspecified bursa  -     Ambulatory referral/consult to Orthopedics; Future; Expected date: 05/20/2025  -     CBC Auto Differential; Future; Expected date: 05/13/2025  -     Comprehensive Metabolic Panel; Future; Expected date: 05/13/2025  -     Hemoglobin A1C; Future; Expected date: 05/13/2025  -     Lipid Panel; Future; Expected date: 05/13/2025  -     TSH; Future; Expected date: 05/13/2025  -     Vitamin D; Future; Expected date: 05/13/2025    3. Primary hypertension  -     CBC Auto Differential; Future; Expected date: 05/13/2025  -     Comprehensive Metabolic Panel; Future; Expected date: 05/13/2025  -     Hemoglobin A1C; Future; Expected date: 05/13/2025  -     Lipid Panel; Future; Expected date: 05/13/2025  -     TSH; Future; Expected date: 05/13/2025  -     Vitamin D; Future; Expected date: 05/13/2025    4. Encounter for monitoring NSAID therapy  -     CBC Auto Differential; Future; Expected date: 05/13/2025  -     Comprehensive Metabolic Panel; Future; Expected date: 05/13/2025  -     Hemoglobin A1C; Future; Expected date: 05/13/2025  -     Lipid Panel; Future; Expected date: 05/13/2025  -     TSH; Future; Expected date: 05/13/2025  -     Vitamin D;  Future; Expected date: 05/13/2025    5. Right foot pain  -     CBC Auto Differential; Future; Expected date: 05/13/2025  -     Comprehensive Metabolic Panel; Future; Expected date: 05/13/2025  -     Hemoglobin A1C; Future; Expected date: 05/13/2025  -     Lipid Panel; Future; Expected date: 05/13/2025  -     TSH; Future; Expected date: 05/13/2025  -     Vitamin D; Future; Expected date: 05/13/2025  -     Vitamin B12; Future; Expected date: 05/13/2025  -     Ferritin; Future; Expected date: 05/13/2025    6. Neuropathy  -     CBC Auto Differential; Future; Expected date: 05/13/2025  -     Comprehensive Metabolic Panel; Future; Expected date: 05/13/2025  -     Hemoglobin A1C; Future; Expected date: 05/13/2025  -     Lipid Panel; Future; Expected date: 05/13/2025  -     TSH; Future; Expected date: 05/13/2025  -     Vitamin D; Future; Expected date: 05/13/2025  -     Vitamin B12; Future; Expected date: 05/13/2025  -     Ferritin; Future; Expected date: 05/13/2025    7. Age related osteoporosis, unspecified pathological fracture presence  -     CBC Auto Differential; Future; Expected date: 05/13/2025  -     Comprehensive Metabolic Panel; Future; Expected date: 05/13/2025  -     Hemoglobin A1C; Future; Expected date: 05/13/2025  -     Lipid Panel; Future; Expected date: 05/13/2025  -     TSH; Future; Expected date: 05/13/2025  -     Vitamin D; Future; Expected date: 05/13/2025    8. Prediabetes  -     CBC Auto Differential; Future; Expected date: 05/13/2025  -     Comprehensive Metabolic Panel; Future; Expected date: 05/13/2025  -     Hemoglobin A1C; Future; Expected date: 05/13/2025  -     Lipid Panel; Future; Expected date: 05/13/2025  -     TSH; Future; Expected date: 05/13/2025  -     Vitamin D; Future; Expected date: 05/13/2025    9. Chronic pain of right knee  -     CBC Auto Differential; Future; Expected date: 05/13/2025  -     Comprehensive Metabolic Panel; Future; Expected date: 05/13/2025  -     Hemoglobin A1C;  Future; Expected date: 05/13/2025  -     Lipid Panel; Future; Expected date: 05/13/2025  -     TSH; Future; Expected date: 05/13/2025  -     Vitamin D; Future; Expected date: 05/13/2025    10. Centrilobular emphysema  -     CBC Auto Differential; Future; Expected date: 05/13/2025  -     Comprehensive Metabolic Panel; Future; Expected date: 05/13/2025  -     Hemoglobin A1C; Future; Expected date: 05/13/2025  -     Lipid Panel; Future; Expected date: 05/13/2025  -     TSH; Future; Expected date: 05/13/2025  -     Vitamin D; Future; Expected date: 05/13/2025    11. Nodule of lower lobe of right lung  -     CT Chest Without Contrast; Future; Expected date: 05/13/2025  -     CBC Auto Differential; Future; Expected date: 05/13/2025  -     Comprehensive Metabolic Panel; Future; Expected date: 05/13/2025  -     Hemoglobin A1C; Future; Expected date: 05/13/2025  -     Lipid Panel; Future; Expected date: 05/13/2025  -     TSH; Future; Expected date: 05/13/2025  -     Vitamin D; Future; Expected date: 05/13/2025    12. Aortic atherosclerosis  -     CBC Auto Differential; Future; Expected date: 05/13/2025  -     Comprehensive Metabolic Panel; Future; Expected date: 05/13/2025  -     Hemoglobin A1C; Future; Expected date: 05/13/2025  -     Lipid Panel; Future; Expected date: 05/13/2025  -     TSH; Future; Expected date: 05/13/2025  -     Vitamin D; Future; Expected date: 05/13/2025    13. Mixed hyperlipidemia  -     CBC Auto Differential; Future; Expected date: 05/13/2025  -     Comprehensive Metabolic Panel; Future; Expected date: 05/13/2025  -     Hemoglobin A1C; Future; Expected date: 05/13/2025  -     Lipid Panel; Future; Expected date: 05/13/2025  -     TSH; Future; Expected date: 05/13/2025  -     Vitamin D; Future; Expected date: 05/13/2025    14. Myalgia due to statin  -     CBC Auto Differential; Future; Expected date: 05/13/2025  -     Comprehensive Metabolic Panel; Future; Expected date: 05/13/2025  -     Hemoglobin  A1C; Future; Expected date: 05/13/2025  -     Lipid Panel; Future; Expected date: 05/13/2025  -     TSH; Future; Expected date: 05/13/2025  -     Vitamin D; Future; Expected date: 05/13/2025    15. Former smoker  -     CT Chest Without Contrast; Future; Expected date: 05/13/2025  -     CBC Auto Differential; Future; Expected date: 05/13/2025  -     Comprehensive Metabolic Panel; Future; Expected date: 05/13/2025  -     Hemoglobin A1C; Future; Expected date: 05/13/2025  -     Lipid Panel; Future; Expected date: 05/13/2025  -     TSH; Future; Expected date: 05/13/2025  -     Vitamin D; Future; Expected date: 05/13/2025    16. Other long term (current) drug therapy  -     Vitamin B12; Future; Expected date: 05/13/2025  -     Ferritin; Future; Expected date: 05/13/2025    17. Abnormal finding of blood chemistry, unspecified  -     Ferritin; Future; Expected date: 05/13/2025             Kasia Acosta MD

## 2025-05-14 ENCOUNTER — RESULTS FOLLOW-UP (OUTPATIENT)
Dept: PRIMARY CARE CLINIC | Facility: CLINIC | Age: 70
End: 2025-05-14

## 2025-05-14 LAB
MUMPS IGG (OHS): >300 AU/ML
MUMPS IGG INTERPRETATION (OHS): POSITIVE
RUBEOLA (MEASLES) IGG (OHS): >300 AU/ML
RUBEOLA IGG INTERP. (OHS): POSITIVE
RUBV IGG SER-ACNC: 249 IU/ML
RUBV IGG SER-IMP: REACTIVE

## 2025-05-16 NOTE — PROGRESS NOTES
[facility]  Physical Medicine and Rehabilitation Clinic  [address]  [place] [id number]         Full Name: Karlie Carrion Gender: Female  Patient ID: [id number] YOB: 1955      Visit Date: 5/19/2025 2:59 PM  Age: 69 Years  Examining Physician: La Nena Maldonado MD  Referring Physician: DO Sheila  Height: 5 feet 1 inch  Weight: 141 lbs  Patient History: 69-year-old female with past medical history prediabetes, chronic right foot pain who presents with pain and numbness in the right foot.  Has numbness in the bottom and top of the foot. Has had back injury in the past but no significant chronic back.  No pain shooting down the right leg.  Exam: No significant muscle atrophy right lower extremity      Sensory NCS      Nerve / Sites Rec. Site Onset Lat Peak Lat NP Amp PP Amp Segments Distance Velocity Comment     ms ms µV µV  mm m/s    L Superficial peroneal - (Antidromic)      Lat leg Ankle 2.83 3.38 6.9 6.3 Lat leg - Ankle 140 49    R Superficial peroneal - (Antidromic)      Lat leg Ankle 3.04 3.73 3.2 5.2 Lat leg - Ankle 140 46    R Sural - (Antidromic)      Calf Ankle 2.50 3.18 6.7 6.3 Calf - Ankle 140 56    L Sural - (Antidromic)      Calf Ankle 2.60 3.23 12.2 8.2 Calf - Ankle 140 54        Motor NCS      Nerve / Sites Muscle Latency Amplitude Segments Dist. Lat Diff Velocity Comments     ms mV  mm ms m/s    R Peroneal - EDB      Ankle EDB 3.29 2.5 Ankle - EDB 80         B. Fib Head EDB 8.44 2.0 B. Fib Head - Ankle 264 5.15 51.3    R Tibial - AH      Ankle AH 3.50 12.3 Ankle - AH 80      L Tibial - AH      Ankle AH 3.23 15.1 Ankle - AH 80      L Peroneal - EDB      Ankle EDB 3.54 2.5 Ankle - EDB 80          EMG Summary Table     Spontaneous MUAP Recruitment   Muscle IA Fib PSW Fasc H.F. Amp Dur. PPP Pattern   R. Tibialis anterior N None None None None N N N N   R. Gastrocnemius (Medial head) N None None None 2+ N N N decreased activation   R.  Peroneus longus N None None None None 1+ N N N   R. Vastus medialis N None None None None N N N N   R. Semimembranosus N None None None None 1+ 1+ N N   R. Biceps femoris (long head) N None None None None N N N N       Summary    Sensory:  The bilateral sural and superficial peroneal sensory responses were normal.    Motor:  The bilateral peroneal and tibial motor responses were normal.    The needle EMG examination was performed in 6 muscles. It was normal in 3 muscle(s): R. Tibialis anterior, R. Vastus medialis, R. Biceps femoris (long head). The study was abnormal in 3 muscle(s), with the following distribution:  Abnormal spontaneous/insertional activity was found in R. Gastrocnemius (Medial head).  The MUP waveform abnormality was found in R. Peroneus longus, R. Semimembranosus.  Abnormal interference pattern was found in R. Gastrocnemius (Medial head).        Conclusion:  Abnormal study    There is electrodiagnostic evidence suggestive of a possible mild chronic right L5 and possibly S1 radiculopathy without active/ongoing motor denervation  There is no electrodiagnostic evidence of a right peroneal, tibial, superficial peroneal, or sural mononeuropathy  There is no electrodiagnostic evidence of a peripheral polyneuropathy      The findings were explained to the patient.  Recommended following up with the referring physician.    ________________________  La Nena Maldonado MD

## 2025-05-19 ENCOUNTER — PATIENT MESSAGE (OUTPATIENT)
Dept: PAIN MEDICINE | Facility: CLINIC | Age: 70
End: 2025-05-19
Payer: MEDICARE

## 2025-05-19 ENCOUNTER — OFFICE VISIT (OUTPATIENT)
Dept: PHYSICAL MEDICINE AND REHAB | Facility: CLINIC | Age: 70
End: 2025-05-19
Payer: MEDICARE

## 2025-05-19 VITALS — BODY MASS INDEX: 26.65 KG/M2 | WEIGHT: 141.13 LBS | HEIGHT: 61 IN

## 2025-05-19 DIAGNOSIS — G60.9 IDIOPATHIC PERIPHERAL NEUROPATHY: ICD-10-CM

## 2025-05-19 PROCEDURE — 99999 PR PBB SHADOW E&M-EST. PATIENT-LVL II: CPT | Mod: PBBFAC,,, | Performed by: STUDENT IN AN ORGANIZED HEALTH CARE EDUCATION/TRAINING PROGRAM

## 2025-05-19 PROCEDURE — 99499 UNLISTED E&M SERVICE: CPT | Mod: S$PBB,,, | Performed by: STUDENT IN AN ORGANIZED HEALTH CARE EDUCATION/TRAINING PROGRAM

## 2025-05-19 PROCEDURE — 95886 MUSC TEST DONE W/N TEST COMP: CPT | Mod: 26,S$PBB,, | Performed by: STUDENT IN AN ORGANIZED HEALTH CARE EDUCATION/TRAINING PROGRAM

## 2025-05-19 PROCEDURE — 95886 MUSC TEST DONE W/N TEST COMP: CPT | Mod: PBBFAC | Performed by: STUDENT IN AN ORGANIZED HEALTH CARE EDUCATION/TRAINING PROGRAM

## 2025-05-19 PROCEDURE — 95910 NRV CNDJ TEST 7-8 STUDIES: CPT | Mod: PBBFAC | Performed by: STUDENT IN AN ORGANIZED HEALTH CARE EDUCATION/TRAINING PROGRAM

## 2025-05-19 PROCEDURE — 99212 OFFICE O/P EST SF 10 MIN: CPT | Mod: PBBFAC | Performed by: STUDENT IN AN ORGANIZED HEALTH CARE EDUCATION/TRAINING PROGRAM

## 2025-05-19 PROCEDURE — 95910 NRV CNDJ TEST 7-8 STUDIES: CPT | Mod: 26,S$PBB,, | Performed by: STUDENT IN AN ORGANIZED HEALTH CARE EDUCATION/TRAINING PROGRAM

## 2025-05-21 DIAGNOSIS — M70.62 GREATER TROCHANTERIC BURSITIS OF LEFT HIP: Primary | ICD-10-CM

## 2025-05-21 DIAGNOSIS — M70.61 GREATER TROCHANTERIC BURSITIS OF RIGHT HIP: ICD-10-CM

## 2025-06-01 ENCOUNTER — PATIENT MESSAGE (OUTPATIENT)
Dept: ORTHOPEDICS | Facility: CLINIC | Age: 70
End: 2025-06-01
Payer: MEDICARE

## 2025-06-03 ENCOUNTER — PATIENT MESSAGE (OUTPATIENT)
Dept: PRIMARY CARE CLINIC | Facility: CLINIC | Age: 70
End: 2025-06-03
Payer: MEDICARE

## 2025-06-03 RX ORDER — GABAPENTIN 300 MG/1
300 CAPSULE ORAL NIGHTLY
Qty: 30 CAPSULE | Refills: 3 | Status: SHIPPED | OUTPATIENT
Start: 2025-06-03

## 2025-06-05 ENCOUNTER — HOSPITAL ENCOUNTER (OUTPATIENT)
Dept: RADIOLOGY | Facility: HOSPITAL | Age: 70
Discharge: HOME | End: 2025-06-05
Attending: STUDENT IN AN ORGANIZED HEALTH CARE EDUCATION/TRAINING PROGRAM
Payer: MEDICARE

## 2025-06-05 DIAGNOSIS — M70.62 GREATER TROCHANTERIC BURSITIS OF LEFT HIP: ICD-10-CM

## 2025-06-05 DIAGNOSIS — M70.61 GREATER TROCHANTERIC BURSITIS OF RIGHT HIP: ICD-10-CM

## 2025-06-05 PROCEDURE — 73521 X-RAY EXAM HIPS BI 2 VIEWS: CPT | Mod: TC,PN

## 2025-06-05 PROCEDURE — 73521 X-RAY EXAM HIPS BI 2 VIEWS: CPT | Mod: 26,,, | Performed by: RADIOLOGY

## 2025-06-08 ENCOUNTER — OFFICE VISIT (OUTPATIENT)
Dept: URGENT CARE | Facility: CLINIC | Age: 70
End: 2025-06-08
Payer: MEDICARE

## 2025-06-08 VITALS
WEIGHT: 141 LBS | BODY MASS INDEX: 26.62 KG/M2 | TEMPERATURE: 98 F | DIASTOLIC BLOOD PRESSURE: 74 MMHG | SYSTOLIC BLOOD PRESSURE: 139 MMHG | RESPIRATION RATE: 18 BRPM | HEART RATE: 49 BPM | OXYGEN SATURATION: 98 % | HEIGHT: 61 IN

## 2025-06-08 DIAGNOSIS — M25.471 RIGHT ANKLE SWELLING: Primary | ICD-10-CM

## 2025-06-08 PROCEDURE — 73610 X-RAY EXAM OF ANKLE: CPT | Mod: RT,S$GLB,, | Performed by: RADIOLOGY

## 2025-06-08 PROCEDURE — 99213 OFFICE O/P EST LOW 20 MIN: CPT | Mod: S$GLB,,, | Performed by: FAMILY MEDICINE

## 2025-06-08 NOTE — PROGRESS NOTES
"Subjective:      Patient ID: Karlie Carrion is a 69 y.o. female.    Vitals:  height is 5' 1" (1.549 m) and weight is 64 kg (141 lb). Her oral temperature is 98 °F (36.7 °C). Her blood pressure is 139/74 and her pulse is 49 (abnormal). Her respiration is 18 and oxygen saturation is 98%.     Chief Complaint: Joint Swelling    Pt states that she sprained her rt ankle 17 days ago and would like to get it checked out. She states that she has beem taking aleve and tylenol for pain management. Pt states that she has a appt with her ortho on Friday.      Musculoskeletal:  Positive for joint pain.      Objective:     Vitals:    06/08/25 0923   BP: 139/74   BP Location: Left arm   Patient Position: Sitting   Pulse: (!) 49   Resp: 18   Temp: 98 °F (36.7 °C)   TempSrc: Oral   SpO2: 98%   Weight: 64 kg (141 lb)   Height: 5' 1" (1.549 m)      Physical Exam   Musculoskeletal:         General: Swelling (right ankle) and tenderness (right lateral malleolus) present.      Comments: Painful but normal range of motion in all direction of right ankle.  Wears walking boot.  In crutches.  Peripheral pulses intact.   Neurological: She is alert. No sensory deficit.   Psychiatric: Thought content normal.       Assessment:     1. Right ankle swelling        Plan:       Right ankle swelling  -     XR ANKLE COMPLETE 3 VIEW RIGHT; Future; Expected date: 06/08/2025  RICE  Walking boot and continues to use crutches.  Tylenol/ aleve- not interested in prescription medication.                  "

## 2025-06-09 ENCOUNTER — PATIENT MESSAGE (OUTPATIENT)
Dept: ORTHOPEDICS | Facility: CLINIC | Age: 70
End: 2025-06-09
Payer: MEDICARE

## 2025-06-13 ENCOUNTER — OFFICE VISIT (OUTPATIENT)
Dept: ORTHOPEDICS | Facility: CLINIC | Age: 70
End: 2025-06-13
Payer: MEDICARE

## 2025-06-13 VITALS — BODY MASS INDEX: 26.65 KG/M2 | HEIGHT: 61 IN | WEIGHT: 141.13 LBS

## 2025-06-13 DIAGNOSIS — S86.011A STRAIN OF RIGHT ACHILLES TENDON, INITIAL ENCOUNTER: ICD-10-CM

## 2025-06-13 DIAGNOSIS — G57.61 MORTON'S NEUROMA OF SECOND INTERSPACE OF RIGHT FOOT: ICD-10-CM

## 2025-06-13 DIAGNOSIS — S93.491A SPRAIN OF ANTERIOR TALOFIBULAR LIGAMENT OF RIGHT ANKLE, INITIAL ENCOUNTER: Primary | ICD-10-CM

## 2025-06-13 PROCEDURE — 99213 OFFICE O/P EST LOW 20 MIN: CPT | Mod: PBBFAC | Performed by: ORTHOPAEDIC SURGERY

## 2025-06-13 PROCEDURE — 99999 PR PBB SHADOW E&M-EST. PATIENT-LVL III: CPT | Mod: PBBFAC,,, | Performed by: ORTHOPAEDIC SURGERY

## 2025-06-13 NOTE — PROGRESS NOTES
Subjective:      Patient ID: Karlie Carrion is a 69 y.o. female.    Chief Complaint: Pain of the Right Foot      HPI:   History of Present Illness    HPI:  Patient presents with a right ankle injury sustained three weeks ago while descending a step ladder backwards during a deck renovation. She twisted her ankle when the ladder wobbled due to a missing foot, causing her to leap off and land awkwardly. The injury primarily affects the Achilles tendon area and the back of the heel, with swelling and tenderness on the lateral aspect of the ankle. She reports difficulty with full range of motion, particularly when moving her ankle up and down or making circles. She can only wear a small boot or go barefoot with a sock due to pain.    She initially took ibuprofen but switched to naproxen (Aleve) 220mg twice daily. She reports some improvement, stating that yesterday was the first day she noticed significant improvement.    She has a separate issue with Gooden's neuroma in her foot, causing numbness under her foot and soreness on top. She has been taking Gabapentin for 30 days, which has significantly reduced her pain.  We had discussed Gooden's neuroma surgery when I last saw her on 07/15/2024 but she wanted to try some alternative measures which have not really given her any relief.  She would like to proceed with Gooden's neuroma surgery.        MEDICATIONS:  - Naproxen (Aleve): 220 mg 2 tablets twice daily for ankle pain, showing improvement  - Gabapentin: For foot pain related to Gooden's neuroma, significantly reduced pain after 30 days      ROS:  Musculoskeletal: +limb pain, +limb swelling, +limited movement  Neurological: +numbness          Past Medical History:   Diagnosis Date    Centrilobular emphysema 02/28/2023    HLD (hyperlipidemia)     Osteoporosis     Other lesions of oral mucosa 09/28/2023    Positive colorectal cancer screening using Cologuard test 01/04/2023    Stress fracture of right ankle 12/01/2022  "    Current Medications[1]  Review of patient's allergies indicates:  No Known Allergies    Ht 5' 1" (1.549 m)   Wt 64 kg (141 lb 1.5 oz)   BMI 26.66 kg/m²     Objective:    Ortho Exam   Physical Exam    This is a well-developed well-nourished female who walks in with a short fracture boot.  Inspection of the right ankle reveals minimal swelling.  She has functional motion of her right ankle and subtalar joint with minimal pain.  She has some mild tenderness over the anterolateral ligaments as well as over the midsubstance of the Achilles tendon.  Calf compression produces plantar flexion of the foot against gravity.  There is no gross instability of the ankle.  There is continued tenderness in the right 2nd intermetatarsal space.             Assessment:       1. Sprain of anterior talofibular ligament of right ankle, initial encounter        2. Strain of right Achilles tendon, initial encounter        3. 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  - Surgery scheduled for June 25th.  - Use small boot if it provides comfort.  - Use crutches as needed for support.  - Resume swimming as tolerated.        Recommendation:  I do not believe she sustained any significant structural damage as a result of her recent injury.  She reports she is doing better and I think this will continue to heal.  She can use the boot as needed.  We discussed Gooden's neuroma " surgery and I explained to her that I removed the nerve through the plantar aspect of the foot.  We discussed the postoperative course.  We will set this up to be done on 06/25/2025 she will return for preoperative H&P consent          This note was generated with the assistance of ambient listening technology. Verbal consent was obtained by the patient and accompanying visitor(s) for the recording of patient appointment to facilitate this note. I attest to having reviewed and edited the generated note for accuracy, though some syntax or spelling errors may persist. Please contact the author of this note for any clarification.               [1]   Current Outpatient Medications:     albuterol (VENTOLIN HFA) 90 mcg/actuation inhaler, Inhale 2 puffs into the lungs every 6 (six) hours as needed for Wheezing. Rescue, Disp: 18 g, Rfl: 0    calcium carbonate-vitamin D3 (CALCIUM 600 WITH VITAMIN D3) 600 mg-10 mcg (400 unit) Chew, Take by mouth once., Disp: , Rfl:     gabapentin (NEURONTIN) 300 MG capsule, Take 1 capsule (300 mg total) by mouth every evening., Disp: 30 capsule, Rfl: 3    ibuprofen (ADVIL,MOTRIN) 200 MG tablet, Take 200 mg by mouth as needed for Pain., Disp: , Rfl:

## 2025-06-16 ENCOUNTER — PATIENT MESSAGE (OUTPATIENT)
Dept: PREADMISSION TESTING | Facility: HOSPITAL | Age: 70
End: 2025-06-16
Payer: MEDICARE

## 2025-06-16 NOTE — ANESTHESIA PAT ROS NOTE
06/16/2025  Karlie Carrion is a 69 y.o., female.      Pre-op Assessment    I have reviewed the Patient Summary Reports.       I have reviewed the Medications.     Review of Systems  Anesthesia Hx:  No problems with previous Anesthesia   History of prior surgery of interest to airway management or planning:  Previous anesthesia: Nerve Block      for 12/20/2023  Cheilectomy right foot.  Procedure performed at an [facility].     Denies Personal Hx of Anesthesia complications.                    Social:  Former Smoker, No Alcohol Use, Recreational Drugs Quit smoking in 2014, formerly smoked 1.5 ppd for 25 years,  37.5 total pack-years,  Smokes THC daily      Hematology/Oncology:  Hematology Normal   Oncology Normal                                   EENT/Dental:  EENT/Dental Normal           Cardiovascular:  Exercise tolerance: good   Hypertension   Denies MI.     Denies CABG/stent.    Denies Angina.     hyperlipidemia  Denies ESPINOSA.  ECG has been reviewed. Varicose veins of both lower extremities,  Aortic atherosclerosis Patient not on beta blockers                          Pulmonary:   COPD    Denies Shortness of breath.   Centrilobular emphysema,  Nodule of lower lobe of right lung               Renal/:  Renal/ Normal                 Hepatic/GI:  Hepatic/GI Normal     Denies GERD.    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    Denies Seizures.    Myalgia due to statin       Peripheral Neuropathy                          Endocrine:  Denies Diabetes. Denies Hypothyroidism.  Prediabetes, HA1C = 5.4 on 5/13/2025        Psych:  Psychiatric Normal                   Past Medical History:   Diagnosis Date    Centrilobular emphysema 02/28/2023    HLD (hyperlipidemia)     Osteoporosis     Other lesions of oral mucosa 09/28/2023    Positive colorectal cancer screening using Cologuard test 01/04/2023    Stress fracture of right ankle 12/01/2022     Past Surgical History:   Procedure Laterality Date    CHEILECTOMY Right 12/20/2023    Procedure: CHEILECTOMY;  Surgeon: Ian Strickland MD;  Location: [facility];  Service: Orthopedics;  Laterality: Right;    KNEE ARTHROSCOPY Bilateral        Anesthesia Assessment: Preoperative EQUATION    Planned Procedure: Procedure(s) (LRB):  EXCISION, HESS'S NEUROMA, right 2nd space (Right)  Requested Anesthesia Type:Choice  Surgeon: Ian Strickland MD  Service: Orthopedics  Known or anticipated Date of Surgery:6/25/2025    Surgeon notes: reviewed    Electronic QUestionnaire Assessment completed via nurse interview with patient.        Triage considerations:     The patient has no apparent active cardiac condition (No unstable coronary Syndrome such as severe unstable angina or recent [<1 month] myocardial infarction, decompensated CHF, severe valvular   disease or significant arrhythmia)    Previous anesthesia records:MAC, Nerve block for post-op pain, and No problems    [facility]   Subspecialty notes: Pain Management, Ortho    Other important co-morbidities: COPD, HLD, and HTN       EKG 3/27/2023:  Vent. Rate : 053 BPM     Atrial Rate : 053 BPM      P-R Int : 160 ms          QRS Dur : 086 ms       QT Int : 450 ms       P-R-T Axes : 065 044 036 degrees      QTc Int : 422 ms   Sinus bradycardia with marked sinus arrythmia   Otherwise normal ECG   Confirmed by Kvng VILLATORO, Katheryn ([id number]) on 3/28/2023 3:26:26 PM        Tests  already available:  Results have been reviewed.             Instructions given. (See in Nurse's note) Preop medication instructions sent via portal message.     Optimization:  Anesthesia Preop Clinic Assessment Not Indicated    Medical Opinion Indicated: No       Sub-specialist consult indicated: No      Plan:  Consultation:medical clearance is not requested.        Navigation:  Straight Line to surgery.               No tests, anesthesia preop clinic visit, or consult required.                        Patient is OK to proceed with surgery at [facility].       Ht: 5'1  Wt: 64 kg (141 lb)  BMI: 26.66

## 2025-06-17 ENCOUNTER — TELEPHONE (OUTPATIENT)
Dept: SPORTS MEDICINE | Facility: CLINIC | Age: 70
End: 2025-06-17
Payer: MEDICARE

## 2025-06-17 ENCOUNTER — OFFICE VISIT (OUTPATIENT)
Dept: ORTHOPEDICS | Facility: CLINIC | Age: 70
End: 2025-06-17
Payer: MEDICARE

## 2025-06-17 ENCOUNTER — PATIENT MESSAGE (OUTPATIENT)
Dept: SPORTS MEDICINE | Facility: CLINIC | Age: 70
End: 2025-06-17
Payer: MEDICARE

## 2025-06-17 DIAGNOSIS — G57.61 MORTON'S NEUROMA OF SECOND INTERSPACE OF RIGHT FOOT: Primary | ICD-10-CM

## 2025-06-17 PROCEDURE — 99499 UNLISTED E&M SERVICE: CPT | Mod: S$PBB,,, | Performed by: PHYSICIAN ASSISTANT

## 2025-06-17 RX ORDER — METHOCARBAMOL 750 MG/1
1500 TABLET, FILM COATED ORAL EVERY 8 HOURS PRN
Qty: 18 TABLET | Refills: 0 | Status: SHIPPED | OUTPATIENT
Start: 2025-06-17

## 2025-06-17 RX ORDER — CELECOXIB 200 MG/1
200 CAPSULE ORAL EVERY 12 HOURS PRN
Qty: 30 CAPSULE | Refills: 0 | Status: SHIPPED | OUTPATIENT
Start: 2025-06-17

## 2025-06-17 RX ORDER — OXYCODONE HYDROCHLORIDE 5 MG/1
5 TABLET ORAL EVERY 4 HOURS PRN
Qty: 12 TABLET | Refills: 0 | Status: SHIPPED | OUTPATIENT
Start: 2025-06-17

## 2025-06-17 NOTE — H&P (VIEW-ONLY)
[facility]  Orthopedic Surgery  Clinic Note      Subjective:   History of Present Illness    CHIEF COMPLAINT:  Patient is here today for pre-operative visit in preparation for right 2nd space ramos's neuroma excision performed by Dr. Strickland on 6/25/2025.  Patient was last seen and treated in the clinic on 6/13/2025.  There has been no significant change in medical status since last visit. Patient has not acute complaints today other than typical pain.    HISTORY OF PRESENT ILLNESS:  She reports soreness in the second space of right foot where Ramos's neuroma is located. She has a suspected slight tear in her Achilles tendon and anticipates soreness during the upcoming surgery. She is ambulatory but has been advised to avoid heel pressure. She has crutches available if needed.    MEDICATIONS:  She takes Gabapentin 300mg nightly and recently obtained a 30-day supply.    MEDICAL HISTORY:  She received steroid injections several years ago. She denies history of heart disease, blood clots, diabetes, or infections.      ROS:  Musculoskeletal: -joint pain, -muscle pain, +limb pain  Skin: -rash, -lesion  Neurological: -numbness, -tingling        Medications: I have reviewed medication list in the chart at the time of this encounter.     Review of patient's allergies indicates:  No Known Allergies   Past Medical History:   Diagnosis Date    Centrilobular emphysema 02/28/2023    HLD (hyperlipidemia)     Osteoporosis     Other lesions of oral mucosa 09/28/2023    Positive colorectal cancer screening using Cologuard test 01/04/2023    Stress fracture of right ankle 12/01/2022     Past Surgical History:   Procedure Laterality Date    CHEILECTOMY Right 12/20/2023    Procedure: CHEILECTOMY;  Surgeon: Ian Strickland MD;  Location: [facility];  Service: Orthopedics;  Laterality: Right;    KNEE ARTHROSCOPY Bilateral        Objective:     Physical Exam  Constitutional:       General: She is not in acute distress.      Appearance: She is not ill-appearing, toxic-appearing or diaphoretic.   HENT:      Head: Atraumatic.      Right Ear: External ear normal.      Left Ear: External ear normal.      Nose: Nose normal.   Eyes:      Extraocular Movements: Extraocular movements intact.   Cardiovascular:      Pulses:           Dorsalis pedis pulses are 2+ on the right side and 2+ on the left side.   Pulmonary:      Effort: No respiratory distress.      Breath sounds: No stridor. No wheezing.   Abdominal:      General: There is no distension.   Musculoskeletal:      Cervical back: Neck supple. No rigidity.      Right lower leg: No edema.      Left lower leg: No edema.      Right ankle: No swelling or deformity. No tenderness.      Right Achilles Tendon: Tenderness present. No defects.      Left ankle: No swelling or deformity. No tenderness.      Right foot: Tenderness (to 2nd webspace) present.   Skin:     Coloration: Skin is not jaundiced.      Findings: No rash.   Neurological:      General: No focal deficit present.      Mental Status: She is alert. Mental status is at baseline.   Psychiatric:         Behavior: Behavior normal.         Thought Content: Thought content normal.         Judgment: Judgment normal.            Imaging:  I have reviewed R ankle XR obtained on 6/8/2025 with patient.      XR ANKLE COMPLETE 3 VIEW RIGHT  Order: [id number]   Status: Final result       Next appt: 07/14/2025 at 11:30 AM in Orthopedics (Latosha Solomon PA-C)       Dx: Right ankle swelling    Test Result Released: Yes (seen)    0 Result Notes  Details    Reading Physician Reading Date Result Priority   Garland Corley MD  [phone]  6/8/2025 STAT     Narrative & Impression  EXAMINATION:  XR ANKLE COMPLETE 3 VIEW RIGHT     CLINICAL HISTORY:  Effusion, right ankle     TECHNIQUE:  AP, lateral, and oblique images of the right ankle were performed.     COMPARISON:  None     FINDINGS:  Three views right ankle.     No acute displaced fracture or  dislocation of the ankle.  No radiopaque foreign body.  The ankle mortise is intact.  There may be mild edema at the level of the lateral malleolus.     Impression:     1. No convincing acute displaced fracture or dislocation of the ankle.        Electronically signed by:Garland Corley MD  Date:                                            06/08/2025  Time:                                           12:09        Exam Ended: 06/08/25 10:18 CDT Last Resulted: 06/08/25 12:09 CDT             Assessment:       1. 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  - Recommend nerve block and mild to moderate sedation for the procedure.  - Considered risks and benefits of surgery, including potential complications and recovery timeline.  - Assessed risk factors for surgical complications.  - Explained surgical procedure, expected duration (approx 30-60 minutes), and post-op care.  - Discussed potential risks and complications of surgery, including infection, nerve pain, and wound healing issues, etc as listed on consent.     ACTION ITEMS/LIFESTYLE:  - Patient to bring boot and crutches to surgery.  - Patient to wear loose clothing and underwear to surgery.  - Patient to use crutches post-op to avoid putting weight on heel.  - Patient to keep surgical dressing on for 2-3 days post-operation.  - Patient to wash area normally with soap and water after removing dressing.  - Patient should not soak foot in water, especially dirty water sources.  - Patient to elevate leg and foot if swelling occurs.  - Patient to walk on heel as tolerated, despite counterintuitive to Achilles tendonitis.    MEDICATIONS:  - Started Celebrex, to be taken every 12  hours as needed for pain relief.  - Started gabapentin 300 mg, to be taken once at night as needed for nerve pain.  - Started Robaxin, to be taken as needed for muscle spasms.  - Started Oxycodone, to be taken only for significant (10/10) pain.  - Continued Tylenol, can take up to 1,000 mg at a time if needed.    FOLLOW UP:  - Follow up in 2-3 weeks post-op.  - Contact office if need to be seen sooner than scheduled appointment.         Future Appointments   Date Time Provider Department Center   7/14/2025 11:30 AM Latosha Solomon PA-C [facility] [place]   7/15/2025  8:20 AM Ryan Valladares MD [facility]   9/15/2025 10:30 AM Morgan Pace PA-C [facility] [place]   9/17/2025  8:45 AM Amparo De La Rosa MD [facility]   9/22/2025 10:30 AM Morgan Pace PA-C [facility] [place]   9/29/2025 10:30 AM Morgan Pace PA-C [facility] [place]   12/1/2025  9:30 AM OC  CT1 [facility] [place]       Latosha Solomon PA-C  Orthopedic Surgery  [facility]

## 2025-06-17 NOTE — PROGRESS NOTES
Patient is here today for pre-operative visit in preparation for right 2nd space ramos's neuroma excision performed by Dr. Strickland on 6/25/2025.  Patient was last seen and treated in the clinic on 6/13/2025.  There has been no significant change in medical status since last visit. Patient has not acute complaints today other than typical pain.     Allergies, Medications, past medical and surgical history reviewed.     Focused examination performed. See H&P from this encounter.     Patient did surgeon in clinic today. All questions answered.      Patient verbalized an understanding to the education and goals. Patient has displayed readiness to engage in care and is ready to proceed with surgery.  Patient reports family is able and ready to provide assistance at home after discharge.      Patient has short CAM boot at home.    Pre, joan, and post operative procedure and expectations were discussed.  Questions were answered. The patient has been educated and is ready to proceed with surgery.  We discussed surgical outcomes, plans, procedures, pre, joan, and post operative expectations and care. The risks, benefits and alternatives to surgery were discussed with the patient; these include bleeding, infection, vessel/nerve damage, pain, numbness, tingling, complex regional pain syndrome, hardware/surgical failure if used, need for further surgery, malunion, nonunion, DVT, PE, arthritis and death. Patient also understands that the risks of surgery may be greater for some patients due to health or lifestyle issues, such as a current condition or a history of heart disease, diabetes, obesity, clotting disorders, recurrent infections, smoking, sedentary lifestyle, or noncompliance with medications, therapy, or followup. Patient states an understanding and wishes to proceed with surgery.   All questions were answered.  No guarantees were implied or stated.  Surgical consent was/were obtained.    Medications ordered for  bedside delivery. We discussed proper use of each medication. She is already on gabapentin nightly which she can continue.    The patient will contact us if the have any questions or concerns or if there are changes in their medical condition prior to surgery.          Latosha Solomon PA-C  Orthopedic Surgery  [facility]    Recent Visits  Date Type Provider Dept   06/13/25 Office Visit Ian Strickland MD [facility]   07/15/24 Office Visit Ian Strickland MD [facility]   Showing recent visits within past 360 days and meeting all other requirements  Today's Visits  Date Type Provider Dept   06/17/25 Appointment Latosha Solomon PA-C [facility]   Showing today's visits and meeting all other requirements  Future Appointments  Date Type Provider Dept   07/14/25 Appointment Latosha Solomon PA-C [facility]   07/15/25 Appointment Ryan Valladares MD [facility]   Showing future appointments within next 720 days and meeting all other requirements      Future Appointments   Date Time Provider Department [place]   7/14/2025 11:30 AM Latosha Solomon PA-C [facility] [place]   7/15/2025  8:20 AM Ryan Valladares MD [facility] [place]   9/17/2025  8:45 AM Amparo De La Rosa MD [facility]   12/1/2025  9:30 AM OC  CT1 OC CTSCAN [place]

## 2025-06-17 NOTE — H&P
[facility]  Orthopedic Surgery  Clinic Note      Subjective:   History of Present Illness    CHIEF COMPLAINT:  Patient is here today for pre-operative visit in preparation for right 2nd space ramos's neuroma excision performed by Dr. Strickland on 6/25/2025.  Patient was last seen and treated in the clinic on 6/13/2025.  There has been no significant change in medical status since last visit. Patient has not acute complaints today other than typical pain.    HISTORY OF PRESENT ILLNESS:  She reports soreness in the second space of right foot where Armos's neuroma is located. She has a suspected slight tear in her Achilles tendon and anticipates soreness during the upcoming surgery. She is ambulatory but has been advised to avoid heel pressure. She has crutches available if needed.    MEDICATIONS:  She takes Gabapentin 300mg nightly and recently obtained a 30-day supply.    MEDICAL HISTORY:  She received steroid injections several years ago. She denies history of heart disease, blood clots, diabetes, or infections.      ROS:  Musculoskeletal: -joint pain, -muscle pain, +limb pain  Skin: -rash, -lesion  Neurological: -numbness, -tingling        Medications: I have reviewed medication list in the chart at the time of this encounter.     Review of patient's allergies indicates:  No Known Allergies   Past Medical History:   Diagnosis Date    Centrilobular emphysema 02/28/2023    HLD (hyperlipidemia)     Osteoporosis     Other lesions of oral mucosa 09/28/2023    Positive colorectal cancer screening using Cologuard test 01/04/2023    Stress fracture of right ankle 12/01/2022     Past Surgical History:   Procedure Laterality Date    CHEILECTOMY Right 12/20/2023    Procedure: CHEILECTOMY;  Surgeon: Ian Strickland MD;  Location: [facility];  Service: Orthopedics;  Laterality: Right;    KNEE ARTHROSCOPY Bilateral        Objective:     Physical Exam  Constitutional:       General: She is not in acute distress.      Appearance: She is not ill-appearing, toxic-appearing or diaphoretic.   HENT:      Head: Atraumatic.      Right Ear: External ear normal.      Left Ear: External ear normal.      Nose: Nose normal.   Eyes:      Extraocular Movements: Extraocular movements intact.   Cardiovascular:      Pulses:           Dorsalis pedis pulses are 2+ on the right side and 2+ on the left side.   Pulmonary:      Effort: No respiratory distress.      Breath sounds: No stridor. No wheezing.   Abdominal:      General: There is no distension.   Musculoskeletal:      Cervical back: Neck supple. No rigidity.      Right lower leg: No edema.      Left lower leg: No edema.      Right ankle: No swelling or deformity. No tenderness.      Right Achilles Tendon: Tenderness present. No defects.      Left ankle: No swelling or deformity. No tenderness.      Right foot: Tenderness (to 2nd webspace) present.   Skin:     Coloration: Skin is not jaundiced.      Findings: No rash.   Neurological:      General: No focal deficit present.      Mental Status: She is alert. Mental status is at baseline.   Psychiatric:         Behavior: Behavior normal.         Thought Content: Thought content normal.         Judgment: Judgment normal.            Imaging:  I have reviewed R ankle XR obtained on 6/8/2025 with patient.      XR ANKLE COMPLETE 3 VIEW RIGHT  Order: [id number]   Status: Final result       Next appt: 07/14/2025 at 11:30 AM in Orthopedics (Latosha Solomon PA-C)       Dx: Right ankle swelling    Test Result Released: Yes (seen)    0 Result Notes  Details    Reading Physician Reading Date Result Priority   Garland Corley MD  [phone]  6/8/2025 STAT     Narrative & Impression  EXAMINATION:  XR ANKLE COMPLETE 3 VIEW RIGHT     CLINICAL HISTORY:  Effusion, right ankle     TECHNIQUE:  AP, lateral, and oblique images of the right ankle were performed.     COMPARISON:  None     FINDINGS:  Three views right ankle.     No acute displaced fracture or  dislocation of the ankle.  No radiopaque foreign body.  The ankle mortise is intact.  There may be mild edema at the level of the lateral malleolus.     Impression:     1. No convincing acute displaced fracture or dislocation of the ankle.        Electronically signed by:Garland Corley MD  Date:                                            06/08/2025  Time:                                           12:09        Exam Ended: 06/08/25 10:18 CDT Last Resulted: 06/08/25 12:09 CDT             Assessment:       1. 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  - Recommend nerve block and mild to moderate sedation for the procedure.  - Considered risks and benefits of surgery, including potential complications and recovery timeline.  - Assessed risk factors for surgical complications.  - Explained surgical procedure, expected duration (approx 30-60 minutes), and post-op care.  - Discussed potential risks and complications of surgery, including infection, nerve pain, and wound healing issues, etc as listed on consent.     ACTION ITEMS/LIFESTYLE:  - Patient to bring boot and crutches to surgery.  - Patient to wear loose clothing and underwear to surgery.  - Patient to use crutches post-op to avoid putting weight on heel.  - Patient to keep surgical dressing on for 2-3 days post-operation.  - Patient to wash area normally with soap and water after removing dressing.  - Patient should not soak foot in water, especially dirty water sources.  - Patient to elevate leg and foot if swelling occurs.  - Patient to walk on heel as tolerated, despite counterintuitive to Achilles tendonitis.    MEDICATIONS:  - Started Celebrex, to be taken every 12  hours as needed for pain relief.  - Started gabapentin 300 mg, to be taken once at night as needed for nerve pain.  - Started Robaxin, to be taken as needed for muscle spasms.  - Started Oxycodone, to be taken only for significant (10/10) pain.  - Continued Tylenol, can take up to 1,000 mg at a time if needed.    FOLLOW UP:  - Follow up in 2-3 weeks post-op.  - Contact office if need to be seen sooner than scheduled appointment.         Future Appointments   Date Time Provider Department Center   7/14/2025 11:30 AM Latosha Solomon PA-C [facility] [place]   7/15/2025  8:20 AM Ryan Valladares MD [facility]   9/15/2025 10:30 AM Morgan Pace PA-C [facility] [employer] [place]   9/17/2025  8:45 AM Amparo De La Rosa MD [facility]   9/22/2025 10:30 AM Morgan Pace PA-C [facility] [employer] [place]   9/29/2025 10:30 AM Morgan Pace PA-C [facility] [employer] [place]   12/1/2025  9:30 AM OC  CT1 OC CTSCAN [place]       Latosha Solomon PA-C  Orthopedic Surgery  [facility]

## 2025-06-23 ENCOUNTER — TELEPHONE (OUTPATIENT)
Dept: ORTHOPEDICS | Facility: CLINIC | Age: 70
End: 2025-06-23
Payer: MEDICARE

## 2025-06-23 NOTE — TELEPHONE ENCOUNTER
I spoke with pt,confirmed surgery arrival time of 7:10am. [facility] A,nothing to eat or drink after midnight.    Patient verbalized understanding.

## 2025-06-24 ENCOUNTER — ANESTHESIA EVENT (OUTPATIENT)
Dept: SURGERY | Facility: HOSPITAL | Age: 70
End: 2025-06-24
Payer: MEDICARE

## 2025-06-25 ENCOUNTER — HOSPITAL ENCOUNTER (OUTPATIENT)
Facility: HOSPITAL | Age: 70
Discharge: HOME | End: 2025-06-25
Attending: ORTHOPAEDIC SURGERY | Admitting: ORTHOPAEDIC SURGERY
Payer: MEDICARE

## 2025-06-25 ENCOUNTER — ANESTHESIA (OUTPATIENT)
Dept: SURGERY | Facility: HOSPITAL | Age: 70
End: 2025-06-25
Payer: MEDICARE

## 2025-06-25 ENCOUNTER — PATIENT MESSAGE (OUTPATIENT)
Dept: PRIMARY CARE CLINIC | Facility: CLINIC | Age: 70
End: 2025-06-25
Payer: MEDICARE

## 2025-06-25 VITALS
BODY MASS INDEX: 26.43 KG/M2 | RESPIRATION RATE: 19 BRPM | SYSTOLIC BLOOD PRESSURE: 125 MMHG | TEMPERATURE: 98 F | DIASTOLIC BLOOD PRESSURE: 58 MMHG | HEART RATE: 54 BPM | WEIGHT: 140 LBS | HEIGHT: 61 IN | OXYGEN SATURATION: 97 %

## 2025-06-25 DIAGNOSIS — S93.491A SPRAIN OF ANTERIOR TALOFIBULAR LIGAMENT OF RIGHT ANKLE, INITIAL ENCOUNTER: ICD-10-CM

## 2025-06-25 DIAGNOSIS — G57.61 MORTON'S NEUROMA OF SECOND INTERSPACE OF RIGHT FOOT: Primary | ICD-10-CM

## 2025-06-25 PROCEDURE — 25000003 PHARM REV CODE 250: Performed by: NURSE ANESTHETIST, CERTIFIED REGISTERED

## 2025-06-25 PROCEDURE — 28080 REMOVAL OF FOOT LESION: CPT | Mod: RT,,, | Performed by: ORTHOPAEDIC SURGERY

## 2025-06-25 PROCEDURE — 71000033 HC RECOVERY, INTIAL HOUR: Performed by: ORTHOPAEDIC SURGERY

## 2025-06-25 PROCEDURE — 25000003 PHARM REV CODE 250: Performed by: NURSE PRACTITIONER

## 2025-06-25 PROCEDURE — 63600175 PHARM REV CODE 636 W HCPCS: Performed by: NURSE ANESTHETIST, CERTIFIED REGISTERED

## 2025-06-25 PROCEDURE — 63600175 PHARM REV CODE 636 W HCPCS: Performed by: STUDENT IN AN ORGANIZED HEALTH CARE EDUCATION/TRAINING PROGRAM

## 2025-06-25 PROCEDURE — 63600175 PHARM REV CODE 636 W HCPCS: Performed by: NURSE PRACTITIONER

## 2025-06-25 PROCEDURE — 76942 ECHO GUIDE FOR BIOPSY: CPT

## 2025-06-25 PROCEDURE — 36000706: Performed by: ORTHOPAEDIC SURGERY

## 2025-06-25 PROCEDURE — 88304 TISSUE EXAM BY PATHOLOGIST: CPT | Mod: TC | Performed by: ORTHOPAEDIC SURGERY

## 2025-06-25 PROCEDURE — 37000008 HC ANESTHESIA 1ST 15 MINUTES: Performed by: ORTHOPAEDIC SURGERY

## 2025-06-25 PROCEDURE — 94761 N-INVAS EAR/PLS OXIMETRY MLT: CPT

## 2025-06-25 PROCEDURE — 71000015 HC POSTOP RECOV 1ST HR: Performed by: ORTHOPAEDIC SURGERY

## 2025-06-25 PROCEDURE — 36000707: Performed by: ORTHOPAEDIC SURGERY

## 2025-06-25 PROCEDURE — 37000009 HC ANESTHESIA EA ADD 15 MINS: Performed by: ORTHOPAEDIC SURGERY

## 2025-06-25 PROCEDURE — 99900035 HC TECH TIME PER 15 MIN (STAT)

## 2025-06-25 RX ORDER — PROPOFOL 10 MG/ML
VIAL (ML) INTRAVENOUS CONTINUOUS PRN
Status: DISCONTINUED | OUTPATIENT
Start: 2025-06-25 | End: 2025-06-25

## 2025-06-25 RX ORDER — CELECOXIB 200 MG/1
400 CAPSULE ORAL
Status: COMPLETED | OUTPATIENT
Start: 2025-06-25 | End: 2025-06-25

## 2025-06-25 RX ORDER — ONDANSETRON 4 MG/1
8 TABLET, ORALLY DISINTEGRATING ORAL EVERY 8 HOURS PRN
Status: DISCONTINUED | OUTPATIENT
Start: 2025-06-25 | End: 2025-06-25 | Stop reason: HOSPADM

## 2025-06-25 RX ORDER — ACETAMINOPHEN 500 MG
1000 TABLET ORAL
Status: COMPLETED | OUTPATIENT
Start: 2025-06-25 | End: 2025-06-25

## 2025-06-25 RX ORDER — BUPIVACAINE HYDROCHLORIDE 5 MG/ML
INJECTION, SOLUTION EPIDURAL; INTRACAUDAL; PERINEURAL
Status: COMPLETED | OUTPATIENT
Start: 2025-06-25 | End: 2025-06-25

## 2025-06-25 RX ORDER — ACETAMINOPHEN 500 MG
1000 TABLET ORAL EVERY 6 HOURS PRN
Status: ON HOLD | COMMUNITY
End: 2025-06-25 | Stop reason: HOSPADM

## 2025-06-25 RX ORDER — ONDANSETRON HYDROCHLORIDE 2 MG/ML
INJECTION, SOLUTION INTRAVENOUS
Status: DISCONTINUED | OUTPATIENT
Start: 2025-06-25 | End: 2025-06-25

## 2025-06-25 RX ORDER — DEXAMETHASONE SODIUM PHOSPHATE 4 MG/ML
INJECTION, SOLUTION INTRA-ARTICULAR; INTRALESIONAL; INTRAMUSCULAR; INTRAVENOUS; SOFT TISSUE
Status: DISCONTINUED | OUTPATIENT
Start: 2025-06-25 | End: 2025-06-25

## 2025-06-25 RX ORDER — LIDOCAINE HYDROCHLORIDE 20 MG/ML
INJECTION INTRAVENOUS
Status: DISCONTINUED | OUTPATIENT
Start: 2025-06-25 | End: 2025-06-25

## 2025-06-25 RX ORDER — PROPOFOL 10 MG/ML
VIAL (ML) INTRAVENOUS
Status: DISCONTINUED | OUTPATIENT
Start: 2025-06-25 | End: 2025-06-25

## 2025-06-25 RX ORDER — DEXMEDETOMIDINE HYDROCHLORIDE 100 UG/ML
INJECTION, SOLUTION INTRAVENOUS
Status: DISCONTINUED | OUTPATIENT
Start: 2025-06-25 | End: 2025-06-25

## 2025-06-25 RX ORDER — OXYCODONE HYDROCHLORIDE 5 MG/1
5 TABLET ORAL
Status: DISCONTINUED | OUTPATIENT
Start: 2025-06-25 | End: 2025-06-25 | Stop reason: HOSPADM

## 2025-06-25 RX ORDER — MUPIROCIN 20 MG/G
OINTMENT TOPICAL 2 TIMES DAILY
Status: DISCONTINUED | OUTPATIENT
Start: 2025-06-25 | End: 2025-06-25 | Stop reason: HOSPADM

## 2025-06-25 RX ORDER — HALOPERIDOL LACTATE 5 MG/ML
0.5 INJECTION, SOLUTION INTRAMUSCULAR EVERY 10 MIN PRN
Status: DISCONTINUED | OUTPATIENT
Start: 2025-06-25 | End: 2025-06-25 | Stop reason: HOSPADM

## 2025-06-25 RX ORDER — BUPIVACAINE HYDROCHLORIDE 5 MG/ML
INJECTION, SOLUTION EPIDURAL; INTRACAUDAL; PERINEURAL
Status: COMPLETED
Start: 2025-06-25 | End: 2025-06-25

## 2025-06-25 RX ORDER — SODIUM CHLORIDE 0.9 % (FLUSH) 0.9 %
10 SYRINGE (ML) INJECTION
Status: DISCONTINUED | OUTPATIENT
Start: 2025-06-25 | End: 2025-06-25 | Stop reason: HOSPADM

## 2025-06-25 RX ORDER — GLUCAGON 1 MG
1 KIT INJECTION
Status: DISCONTINUED | OUTPATIENT
Start: 2025-06-25 | End: 2025-06-25 | Stop reason: HOSPADM

## 2025-06-25 RX ORDER — FENTANYL CITRATE 50 UG/ML
25 INJECTION, SOLUTION INTRAMUSCULAR; INTRAVENOUS EVERY 5 MIN PRN
Status: DISCONTINUED | OUTPATIENT
Start: 2025-06-25 | End: 2025-06-25 | Stop reason: HOSPADM

## 2025-06-25 RX ORDER — CEFAZOLIN 2 G/1
2 INJECTION, POWDER, FOR SOLUTION INTRAMUSCULAR; INTRAVENOUS
Status: DISCONTINUED | OUTPATIENT
Start: 2025-06-25 | End: 2025-06-25 | Stop reason: HOSPADM

## 2025-06-25 RX ORDER — MIDAZOLAM HYDROCHLORIDE 1 MG/ML
0.5 INJECTION, SOLUTION INTRAMUSCULAR; INTRAVENOUS
Status: DISCONTINUED | OUTPATIENT
Start: 2025-06-25 | End: 2025-06-25 | Stop reason: HOSPADM

## 2025-06-25 RX ADMIN — MIDAZOLAM HYDROCHLORIDE 2 MG: 1 INJECTION, SOLUTION INTRAMUSCULAR; INTRAVENOUS at 08:06

## 2025-06-25 RX ADMIN — PROPOFOL 10 MG: 10 INJECTION, EMULSION INTRAVENOUS at 10:06

## 2025-06-25 RX ADMIN — FENTANYL CITRATE 50 MCG: 50 INJECTION INTRAMUSCULAR; INTRAVENOUS at 08:06

## 2025-06-25 RX ADMIN — SODIUM CHLORIDE: 9 INJECTION, SOLUTION INTRAVENOUS at 10:06

## 2025-06-25 RX ADMIN — PROPOFOL 30 MG: 10 INJECTION, EMULSION INTRAVENOUS at 10:06

## 2025-06-25 RX ADMIN — DEXMEDETOMIDINE 8 MCG: 100 INJECTION, SOLUTION, CONCENTRATE INTRAVENOUS at 10:06

## 2025-06-25 RX ADMIN — PROPOFOL 125 MCG/KG/MIN: 10 INJECTION, EMULSION INTRAVENOUS at 10:06

## 2025-06-25 RX ADMIN — BUPIVACAINE HYDROCHLORIDE 30 ML: 5 INJECTION, SOLUTION EPIDURAL; INTRACAUDAL; PERINEURAL at 09:06

## 2025-06-25 RX ADMIN — ONDANSETRON 4 MG: 2 INJECTION INTRAMUSCULAR; INTRAVENOUS at 10:06

## 2025-06-25 RX ADMIN — CELECOXIB 400 MG: 200 CAPSULE ORAL at 07:06

## 2025-06-25 RX ADMIN — LIDOCAINE HYDROCHLORIDE 60 MG: 20 INJECTION INTRAVENOUS at 10:06

## 2025-06-25 RX ADMIN — DEXMEDETOMIDINE 4 MCG: 100 INJECTION, SOLUTION, CONCENTRATE INTRAVENOUS at 10:06

## 2025-06-25 RX ADMIN — BUPIVACAINE HYDROCHLORIDE 15 ML: 5 INJECTION, SOLUTION EPIDURAL; INTRACAUDAL; PERINEURAL at 09:06

## 2025-06-25 RX ADMIN — ACETAMINOPHEN 1000 MG: 500 TABLET ORAL at 07:06

## 2025-06-25 RX ADMIN — DEXTROSE MONOHYDRATE 2 G: 50 INJECTION, SOLUTION INTRAVENOUS at 10:06

## 2025-06-25 RX ADMIN — DEXAMETHASONE SODIUM PHOSPHATE 4 MG: 4 INJECTION, SOLUTION INTRAMUSCULAR; INTRAVENOUS at 10:06

## 2025-06-25 NOTE — PLAN OF CARE
VSS. Pt able to tolerate oral liquids. Pt denies c/o pain. Dressing intact. Spouse received home meds per bedside delivery. No distress noted. Pt states she is ready for D/C. D/C instructions reviewed with pt and spouse, verbalized understanding.

## 2025-06-25 NOTE — TRANSFER OF CARE
"Anesthesia Transfer of Care Note    Patient: Karlie Carrion    Procedure(s) Performed: Procedure(s) (LRB):  EXCISION, HESS'S NEUROMA, right 2nd space (Right)    Patient location: PACU    Anesthesia Type: MAC and regional    Transport from OR: Transported from OR on 6-10 L/min O2 by face mask with adequate spontaneous ventilation    Post pain: adequate analgesia    Post assessment: no apparent anesthetic complications    Post vital signs: stable    Level of consciousness: alert, awake and oriented    Nausea/Vomiting: no nausea/vomiting    Complications: none    Transfer of care protocol was followed      Last vitals: Visit Vitals  /65   Pulse 64   Temp 36.7 °C (98 °F) (Temporal)   Resp 18   Ht 5' 1" (1.549 m)   Wt 63.5 kg (140 lb)   SpO2 96%   Breastfeeding No   BMI 26.45 kg/m²     "

## 2025-06-25 NOTE — ANESTHESIA PROCEDURE NOTES
Right popliteal SS    Patient location during procedure: pre-op    Reason for block: primary anesthetic    Diagnosis: Right foot ramos's neuroma   Start time: 6/25/2025 8:56 AM  Timeout: 6/25/2025 8:55 AM   End time: 6/25/2025 9:07 AM    Staffing  Authorizing Provider: Cong Silva MD  Performing Provider: Joaquim Weems DO    Staffing  Performed by: Joaquim Weems DO  Authorized by: Cong Silva MD    Preanesthetic Checklist  Completed: patient identified, IV checked, site marked, risks and benefits discussed, surgical consent, monitors and equipment checked, pre-op evaluation and timeout performed  Peripheral Block  Patient position: supine  Prep: ChloraPrep  Patient monitoring: heart rate, cardiac monitor, continuous pulse ox, continuous capnometry and frequent blood pressure checks  Block type: popliteal  Laterality: right  Injection technique: single shot  Needle  Needle type: Stimuplex   Needle gauge: 21 G  Needle length: 4 in  Needle localization: anatomical landmarks and ultrasound guidance   -ultrasound image captured on disc.  Assessment  Injection assessment: negative aspiration, negative parasthesia and local visualized surrounding nerve  Paresthesia pain: none  Heart rate change: no  Slow fractionated injection: yes  Pain Tolerance: comfortable throughout block  Medications:    Medications: bupivacaine (pf) (MARCAINE) injection 0.5% - Perineural   30 mL - 6/25/2025 9:02:00 AM    Additional Notes  VSS.  [facility] RN monitoring vitals throughout procedure.  Patient tolerated procedure well.

## 2025-06-25 NOTE — PLAN OF CARE
Pre op complete. Pt resting comfortably. Call light in reach. Family at bedside, will take pts belongings. Pt has crutches in the car.   Need anesth consent, site norma, and updated H&P.

## 2025-06-25 NOTE — DISCHARGE SUMMARY
[facility] (Hospital)  Discharge Note  Short Stay    Procedure(s) (LRB):  EXCISION, GOODEN'S NEUROMA, right 2nd space (Right)      OUTCOME: Patient tolerated treatment/procedure well without complication and is now ready for discharge.    DISPOSITION: Home or Self Care    FINAL DIAGNOSIS:  Gooden's neuroma of second interspace of right foot    FOLLOWUP: In clinic    DISCHARGE INSTRUCTIONS:    Discharge Procedure Orders   Diet general     Call MD for:  temperature >100.4     Call MD for:  persistent nausea and vomiting     Call MD for:  severe uncontrolled pain     Call MD for:  difficulty breathing, headache or visual disturbances     Call MD for:  redness, tenderness, or signs of infection (pain, swelling, redness, odor or green/yellow discharge around incision site)     Call MD for:  hives     Call MD for:  persistent dizziness or light-headedness     Call MD for:  extreme fatigue     Remove dressing in 72 hours   Order Comments: Ok to remove dressings in 3-5 days. If removed, cover incision with water-proof bandage.     Weight bearing as tolerated   Order Comments: In postop stiff sole shoe        TIME SPENT ON DISCHARGE: 5 minutes

## 2025-06-25 NOTE — ANESTHESIA PREPROCEDURE EVALUATION
06/25/2025  [facility]  Anesthesia Pre-Operative Evaluation     Patient Name: Karlie Carrion  YOB: 1955  MRN: [id number]  [facility]: [id number]       Admit Date: 6/25/2025   Admit Team: Networked reference to record PCT   Hospital Day: 1  Date of Procedure: 6/25/2025  Anesthesia: Choice Procedure: Procedure(s) (LRB):  EXCISION, GOODEN'S NEUROMA, right 2nd space (Right)  Pre-Operative Diagnosis: Gooden's neuroma of second interspace of right foot [G57.61]  Proceduralist:Surgeons and Role:     * Ian Strickland MD - Primary  Code Status: Full Code   Advanced Directive: <no information>  Isolation Precautions: No active isolations  Capacity: Full capacity     SUBJECTIVE:   Karlie Carrion is a 69 y.o. female who  has a past medical history of Centrilobular emphysema (02/28/2023), HLD (hyperlipidemia), Osteoporosis, Other lesions of oral mucosa (09/28/2023), Positive colorectal cancer screening using Cologuard test (01/04/2023), and Stress fracture of right ankle (12/01/2022).  No notes on file      Hospital LOS: 0 days  ICU LOS: Patient does not have an ICU stay during this admission.    she has a current medication list which includes the following long-term medication(s): albuterol, gabapentin, and calcium 600 with vitamin d3.     ALLERGIES:   Review of patient's allergies indicates:  No Known Allergies  LDA:   AIRWAY:         [unfilled]     Lines/Drains/Airways       Peripheral Intravenous Line  Duration             Peripheral IV Single Lumen 06/25/25 0756 20 G Left Hand <1 day                   Anesthesia Evaluation      Airway   Mallampati: II  TM distance: Normal  Neck ROM: Normal ROM  Dental      Pulmonary    (+) COPD  Cardiovascular   (+) hypertension    Neuro/Psych      GI/Hepatic/Renal      Endo/Other    Abdominal                    MEDICATIONS:     Current Outpatient  "Medications on File Prior to Encounter   Medication Sig Dispense Refill Last Dose/Taking    acetaminophen (TYLENOL) 500 MG tablet Take 1,000 mg by mouth every 6 (six) hours as needed for Pain.   6/24/2025    albuterol (VENTOLIN HFA) 90 mcg/actuation inhaler Inhale 2 puffs into the lungs every 6 (six) hours as needed for Wheezing. Rescue 18 g 0 6/25/2025 Morning    gabapentin (NEURONTIN) 300 MG capsule Take 1 capsule (300 mg total) by mouth every evening. 30 capsule 3 6/24/2025 Bedtime    calcium carbonate-vitamin D3 (CALCIUM 600 WITH VITAMIN D3) 600 mg-10 mcg (400 unit) Chew Take by mouth once.   6/18/2025    ibuprofen (ADVIL,MOTRIN) 200 MG tablet Take 200 mg by mouth as needed for Pain.         Inpatient Medications:  Antibiotics (From admission, onward)      Start     Stop Route Frequency Ordered    06/25/25 0713  ceFAZolin 2 g         -- IV On Call Procedure 06/25/25 0713          VTE Risk Mitigation (From admission, onward)      None              Current Medications[1]       History:   There are no hospital problems to display for this patient.    Surgical History:    has a past surgical history that includes Knee arthroscopy (Bilateral) and Cheilectomy (Right, 12/20/2023).   Social History:    has no history on file for sexual activity.  reports that she quit smoking about 11 years ago. Her smoking use included cigarettes. She started smoking about 36 years ago. She has a 37.5 pack-year smoking history. She has never used smokeless tobacco. She reports that she does not currently use alcohol. She reports current drug use. Drug: Marijuana.    Vitals:    06/25/25 0742   BP: (!) 170/79   BP Location: Right arm   Patient Position: Lying   Pulse: (!) 51   Resp: 17   Temp: 36.6 °C (97.9 °F)   TempSrc: Oral   SpO2: 98%   Weight: 63.5 kg (140 lb)   Height: 5' 1" (1.549 m)     Vital Signs Range (Last 24H):  Temp:  [36.6 °C (97.9 °F)]   Pulse:  [51]   Resp:  [17]   BP: (170)/(79)   SpO2:  [98 %]     Body mass index is " "26.45 kg/m².  Wt Readings from Last 4 Encounters:   06/25/25 63.5 kg (140 lb)   06/13/25 64 kg (141 lb 1.5 oz)   06/08/25 64 kg (141 lb)   05/19/25 64 kg (141 lb 1.5 oz)        Intake/Output - Last 3 Shifts       None          Lab Results   Component Value Date    WBC 9.41 05/13/2025    HGB 14.7 05/13/2025    HCT 46.4 05/13/2025     05/13/2025     05/13/2025    K 4.5 05/13/2025     05/13/2025    CREATININE 0.9 05/13/2025    BUN 20 05/13/2025    CO2 28 05/13/2025    GLU 95 05/13/2025    CALCIUM 9.5 05/13/2025    ALKPHOS 79 05/13/2025    ALT 22 05/13/2025    AST 24 05/13/2025    ALBUMIN 3.7 05/13/2025    HGBA1C 5.4 05/13/2025     No results found for this or any previous visit (from the past 12 hours).  No results for input(s): "WBC", "HGB", "HCT", "PLT", "NA", "K", "CREATININE", "GLU", "INR" in the last 168 hours.  No LMP recorded. Patient is postmenopausal.    EKG:   Results for orders placed or performed in visit on 03/27/23   IN OFFICE EKG 12-LEAD (to [place])    Collection Time: 03/27/23  8:14 AM    Narrative    Test Reason : R94.31,Z01.818,    Vent. Rate : 053 BPM     Atrial Rate : 053 BPM     P-R Int : 160 ms          QRS Dur : 086 ms      QT Int : 450 ms       P-R-T Axes : 065 044 036 degrees     QTc Int : 422 ms    Sinus bradycardia with marked sinus arrythmia  Otherwise normal ECG  Confirmed by Katheryn Calderon MD ([id number]) on 3/28/2023 3:26:26 PM    Referred By:             Confirmed By:Katheryn Calderon MD     TTE:  No results found for this or any previous visit.  No results found for this or any previous visit.  PA:  No results found for this or any previous visit.  Stress Test:  No results found for this or any previous visit.     LHC:  No results found for this or any previous visit.     PFT:  No results found for: "FEV1", "FVC", "QLB6YQG", "TLC", "DLCO"           Pre-op Assessment    I have reviewed the Patient Summary Reports.     I have reviewed the Nursing Notes. I have reviewed the NPO Status. "   I have reviewed the Medications.     Review of Systems  Anesthesia Hx:               Denies Personal Hx of Anesthesia complications.                    Cardiovascular:     Hypertension                                          Pulmonary:   COPD                         Physical Exam  General: Alert, Cooperative and Oriented    Airway:  Mallampati: II   Mouth Opening: Normal  TM Distance: Normal  Neck ROM: Normal ROM        Anesthesia Plan  Type of Anesthesia, risks & benefits discussed:    Anesthesia Type: Gen ETT, Gen Natural Airway, Regional  Intra-op Monitoring Plan: Standard ASA Monitors  Post Op Pain Control Plan: multimodal analgesia  Induction:  IV  Airway Plan: Direct  Informed Consent: Informed consent signed with the Patient and all parties understand the risks and agree with anesthesia plan.  All questions answered.   ASA Score: 3  Day of Surgery Review of History & Physical: H&P Update referred to the surgeon/provider.    Ready For Surgery From Anesthesia Perspective.     .           [1]   Current Facility-Administered Medications   Medication Dose Route Frequency Provider Last Rate Last Admin    ceFAZolin 2 g  2 g Intravenous On Call Procedure Ian Strickland MD        fentaNYL 50 mcg/mL injection 25 mcg  25 mcg Intravenous Q5 Min PRN Corina Menezes FNP        midazolam injection 0.5 mg  0.5 mg Intravenous Q3 Min PRN Corina Menezes FNP

## 2025-06-25 NOTE — ANESTHESIA POSTPROCEDURE EVALUATION
Anesthesia Post Evaluation    Patient: Karlie Carrion    Procedure(s) Performed: Procedure(s) (LRB):  EXCISION, HESS'S NEUROMA, right 2nd space (Right)    Final Anesthesia Type: general      Patient location during evaluation: PACU  Patient participation: Yes- Able to Participate  Level of consciousness: awake and alert  Post-procedure vital signs: reviewed and stable  Pain management: adequate  Airway patency: patent    PONV status at discharge: No PONV  Anesthetic complications: no      Cardiovascular status: hemodynamically stable  Hydration status: euvolemic  Follow-up not needed.              Vitals Value Taken Time   /58 06/25/25 11:30   Temp 36.7 °C (98.1 °F) 06/25/25 11:30   Pulse 54 06/25/25 11:30   Resp 19 06/25/25 11:30   SpO2 97 % 06/25/25 11:30         Event Time   Out of Recovery 11:25:00         Pain/Michele Score: Pain Rating Prior to Med Admin: 4 (6/25/2025  7:48 AM)  Michele Score: 9 (6/25/2025 10:59 AM)

## 2025-06-25 NOTE — OP NOTE
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 Strickland   Assistant: Dr. Hector    Complications: None   Estimated blood loss:  None   Specimens: Interdigital nerve from right foot 2nd space sent to pathology     Procedure in detail:   After regional anesthesia was administered in the preop holding area, the patient was brought to the operating room and placed on the operating table in a prone position.  The patient's right leg was prepped and draped in a sterile fashion.  The right foot was exsanguinated with an Esmarch bandage and this was left around the ankle as a tourniquet.  A plantar incision was made in the 2nd intermetatarsal space.  The incision was carried through the skin and subcutaneous tissue down to the plantar fascial layer which was opened in line with the incision.  The interdigital nerve was identified proximally and was dissected as far proximally as possible into the nonweightbearing part of the foot.  The nerve was transected proximally and dissected distally down to the bifurcation.  The nerve was transected at the bifurcation and was sent to pathology for evaluation.  No other abnormalities were noted in the operative field.  The wound was well irrigated.  The skin incision was closed with interrupted 3-0 nylon sutures.  A sterile compressive dressing was placed and the patient was returned to the postop holding area in stable condition.

## 2025-06-25 NOTE — ANESTHESIA PROCEDURE NOTES
Right saphenous SS    Patient location during procedure: pre-op    Reason for block: primary anesthetic    Diagnosis: Right foot ramos's neuroma   Start time: 6/25/2025 8:57 AM  Timeout: 6/25/2025 8:56 AM   End time: 6/25/2025 9:07 AM    Staffing  Authorizing Provider: Cong Silva MD  Performing Provider: Joaquim Weems DO    Staffing  Performed by: Joaquim Weems DO  Authorized by: Cong Silva MD    Preanesthetic Checklist  Completed: patient identified, IV checked, site marked, risks and benefits discussed, surgical consent, monitors and equipment checked, pre-op evaluation and timeout performed  Peripheral Block  Patient position: supine  Prep: ChloraPrep  Patient monitoring: heart rate, cardiac monitor, continuous pulse ox, continuous capnometry and frequent blood pressure checks  Block type: saphenous  Laterality: right  Injection technique: single shot  Needle  Needle type: Stimuplex   Needle gauge: 21 G  Needle length: 4 in  Needle localization: anatomical landmarks and ultrasound guidance   -ultrasound image captured on disc.  Assessment  Injection assessment: negative aspiration, negative parasthesia and local visualized surrounding nerve  Paresthesia pain: none  Heart rate change: no  Slow fractionated injection: yes  Pain Tolerance: comfortable throughout block  Medications:    Medications: bupivacaine (pf) (MARCAINE) injection 0.5% - Perineural   15 mL - 6/25/2025 9:07:00 AM    Additional Notes  VSS.  [facility] RN monitoring vitals throughout procedure.  Patient tolerated procedure well.

## 2025-06-25 NOTE — BRIEF OP NOTE
[place] - Surgery ([facility])  Brief Operative Note    Surgery Date: 6/25/2025     Surgeons and Role:     * Ian Strickland MD - Primary    Assisting Surgeon: None    Pre-op Diagnosis:  Gooden's neuroma of second interspace of right foot [G57.61]    Post-op Diagnosis:  Post-Op Diagnosis Codes:     * Gooden's neuroma of second interspace of right foot [G57.61]    Procedure(s) (LRB):  EXCISION, GOODEN'S NEUROMA, right 2nd space (Right)    Anesthesia: Choice    Operative Findings: see op note    Estimated Blood Loss: * No values recorded between 6/25/2025 10:00 AM and 6/25/2025 10:59 AM *         Specimens:   Specimen (24h ago, onward)       Start     Ordered    06/25/25 1035  Specimen to Pathology  RELEASE UPON ORDERING        References:    Click here for ordering Quick Tip   Question:  Release to patient  Answer:  Immediate    06/25/25 1035                    ID Type Source Tests Collected by Time Destination   1 : Mortons Neuroma Right foot Tissue Foot, Right SPECIMEN TO PATHOLOGY Ian Strickland MD 6/25/2025 1035

## 2025-06-26 NOTE — TELEPHONE ENCOUNTER
LOV with Kasia Acosta MD , 5/13/2025  Pts had excision of rachel's neuroma on yesterday. Reports she was taken off of Gabapentin which helped her sleep. Asking for a rx of ambien. Was going to offer a VV to further discuss but nothing on my end to offer for a few weeks

## 2025-06-26 NOTE — PROGRESS NOTES
Ambien is tough because it is very habit-forming, a controlled substance, and has a lot of risks/SE. Has she ever tried anything else for sleep? There are some potentially better options like Trazadone. If the Gabapentin was helping with pain and sleep we could also continue that but that can also have some long-term risks.

## 2025-06-27 LAB
ESTROGEN SERPL-MCNC: NORMAL PG/ML
INSULIN SERPL-ACNC: NORMAL U[IU]/ML
LAB AP CLINICAL INFORMATION: NORMAL
LAB AP GROSS DESCRIPTION: NORMAL
LAB AP PERFORMING LOCATION(S): NORMAL
LAB AP REPORT FOOTNOTES: NORMAL

## 2025-07-01 ENCOUNTER — PATIENT MESSAGE (OUTPATIENT)
Dept: ORTHOPEDICS | Facility: CLINIC | Age: 70
End: 2025-07-01
Payer: MEDICARE

## 2025-07-02 ENCOUNTER — PATIENT MESSAGE (OUTPATIENT)
Dept: CARDIOLOGY | Facility: CLINIC | Age: 70
End: 2025-07-02

## 2025-07-02 ENCOUNTER — OFFICE VISIT (OUTPATIENT)
Dept: CARDIOLOGY | Facility: CLINIC | Age: 70
End: 2025-07-02
Payer: MEDICARE

## 2025-07-02 VITALS
HEIGHT: 61 IN | DIASTOLIC BLOOD PRESSURE: 88 MMHG | WEIGHT: 141.75 LBS | HEART RATE: 72 BPM | SYSTOLIC BLOOD PRESSURE: 158 MMHG | BODY MASS INDEX: 26.76 KG/M2

## 2025-07-02 DIAGNOSIS — E78.2 MIXED HYPERLIPIDEMIA: ICD-10-CM

## 2025-07-02 DIAGNOSIS — R00.8 OTHER ABNORMALITIES OF HEART BEAT: Primary | ICD-10-CM

## 2025-07-02 DIAGNOSIS — I70.0 AORTIC ATHEROSCLEROSIS: ICD-10-CM

## 2025-07-02 DIAGNOSIS — I10 PRIMARY HYPERTENSION: ICD-10-CM

## 2025-07-02 PROCEDURE — 99999 PR PBB SHADOW E&M-EST. PATIENT-LVL III: CPT | Mod: PBBFAC,,, | Performed by: INTERNAL MEDICINE

## 2025-07-02 PROCEDURE — 93005 ELECTROCARDIOGRAM TRACING: CPT | Mod: PBBFAC | Performed by: INTERNAL MEDICINE

## 2025-07-02 PROCEDURE — 99213 OFFICE O/P EST LOW 20 MIN: CPT | Mod: PBBFAC | Performed by: INTERNAL MEDICINE

## 2025-07-02 RX ORDER — ATORVASTATIN CALCIUM 80 MG/1
80 TABLET, FILM COATED ORAL DAILY
Qty: 90 TABLET | Refills: 3 | Status: SHIPPED | OUTPATIENT
Start: 2025-07-02

## 2025-07-02 RX ORDER — GABAPENTIN 300 MG/1
300 CAPSULE ORAL DAILY
COMMUNITY

## 2025-07-02 NOTE — PROGRESS NOTES
HISTORY:    68 yo F w a h/o hyperlipidemia, previous tob, IBS, and anxiety presenting for follow up.    Comes in to discuss AFib notifications on her watch. No strips.  Seen by me once before in 2023.    The patient denies any symptoms of chest pain, shortness of breath, or dyspnea on exertion.    At baseline good activity levels, boxing 2x/week, lifting weights, pool exercises.     1 week ago underwent RLE foot surgery without issue.     The patient denies any previous history of myocardial infarction, coronary artery disease, peripheral arterial disease, stroke, congestive heart failure, or cardiomyopathy.    Trialed atrovastatin, but stopped by choice in '23.     PHYSICAL EXAM:    Vitals:    07/02/25 1313   BP: (!) 158/88   Pulse: 72     NAD, A+Ox3.  No jvd, no bruit.  RRR nml s1,s2. No murmurs.  CTA B no wheezes or crackles.  No edema.    LABS/STUDIES (imaging reviewed during clinic visit):    May 2025 CBC and CMP normal.  /HDL 42//.  A1c and TSH normal.  ECG July 2025 Sinus arrythmia, no Qs/Sts. March 2023 NSR no Qs/Sts. February 2023 limb lead misplacement.  Sinus rhythm with no Q-waves or ST changes.  PET CT 2023 Aortic and iliac atherosclerosis.     ASSESSMENT & PLAN:    1. Aortic atherosclerosis    2. Primary hypertension    3. Mixed hyperlipidemia    4. Other abnormalities of heart beat          Orders Placed This Encounter    Lipid Panel    Cardiac Monitor - 3-15 Day Adult    Echo    atorvastatin (LIPITOR) 80 MG tablet        Afib notifications likely related  to sinus arrhythmia. Will place 2 week event monitor.    Previous smoking history with COPD.     Aortic atherosclerosis noted.     Marked cholesterol elevation and Bps elevated as well.     Pt has deferred meds previously. Okay with statin therapy today. Retrial atorvastatin 80 TIW and increase to daily as tolerated. Pt will return w a BP log.             Follow up in about 3 months (around 10/2/2025).      Katheryn Calderon MD

## 2025-07-03 LAB
OHS QRS DURATION: 76 MS
OHS QTC CALCULATION: 410 MS

## 2025-07-07 ENCOUNTER — PATIENT MESSAGE (OUTPATIENT)
Dept: CARDIOLOGY | Facility: CLINIC | Age: 70
End: 2025-07-07
Payer: MEDICARE

## 2025-07-10 ENCOUNTER — OFFICE VISIT (OUTPATIENT)
Dept: ORTHOPEDICS | Facility: CLINIC | Age: 70
End: 2025-07-10
Payer: MEDICARE

## 2025-07-10 DIAGNOSIS — G57.61 MORTON'S NEUROMA OF SECOND INTERSPACE OF RIGHT FOOT: Primary | ICD-10-CM

## 2025-07-10 PROCEDURE — 99499 UNLISTED E&M SERVICE: CPT | Mod: S$PBB,,, | Performed by: PHYSICIAN ASSISTANT

## 2025-07-10 NOTE — PROGRESS NOTES
HPI: Patient presents for initial post op visit of right 2nd space ramos's neuroma excision performed by Dr. Strickland on 6/25/2025  Patient's pain is very well controlled.  The patient is wearing normal tennis shoes and has been compliant with weight bearing restrictions and elevation instructions.      Exam:   Constitution: NAD, nontoxic, Ambulating well without assisted device.  Skin: Nylon sutures noted. Incision is clean and dry without tenderness, drainage, fluctuance, edema, induration, or erythema.  MSK: ROM baseline degrees with little pain.  Neuro: Gross sensation to foot intact.     Assessment and plan:  - Dressing and sutures removed today without issues. No signs of infection or wound dehiscence.  - Patient's postoperative course is progressing appropriately. There are no post op complications today.  - The patient was advised to keep the incision clean and dry for the next 24 hours after which they may wash the area with soap in the shower. Instructed to not submerge in water (bathing, lake, river, etc.) until the incision is completely healed, for another 1 week.  - May apply some weight to ball of foot, but regress back to heel walking if uncomfortable.   - Pain medication: no longer taking.   - Follow up in about 4 weeks with Dr. Strickland for post op eval.   - Patient instructed to call or message clinic with problems/concerns.     Future Appointments   Date Time Provider Department Center   7/15/2025  8:20 AM Ryan Valladares MD [facility] ORTHO [place]   8/6/2025  8:45 AM CV [facility] ECHO [facility] [place]   8/11/2025  9:15 AM Ian Strickland MD [facility] ORTHO [address]   9/15/2025 10:30 AM Morgan Pace PA-C [facility] [employer]   9/17/2025  8:45 AM Amparo De La Rosa MD [facility]   9/22/2025 10:30 AM Morgan Pace PA-C [facility] [employer]   9/29/2025 10:30 AM Morgan Pace PA-C [facility] [employer] [place]   10/2/2025  7:45 AM [facility]  [facility]   10/8/2025  9:20 AM Katheryn Calderon MD [facility] CARDIO [place]   12/1/2025  9:30 AM O[facility]  CT1 OC CTSCAN [place]       Latosha Solomon PA-C  Orthopedic Surgery  [facility]

## 2025-07-15 ENCOUNTER — PATIENT MESSAGE (OUTPATIENT)
Dept: PRIMARY CARE CLINIC | Facility: CLINIC | Age: 70
End: 2025-07-15
Payer: MEDICARE

## 2025-08-06 ENCOUNTER — HOSPITAL ENCOUNTER (OUTPATIENT)
Dept: CARDIOLOGY | Facility: HOSPITAL | Age: 70
Discharge: HOME | End: 2025-08-06
Attending: INTERNAL MEDICINE
Payer: MEDICARE

## 2025-08-06 VITALS
WEIGHT: 141 LBS | HEIGHT: 61 IN | DIASTOLIC BLOOD PRESSURE: 73 MMHG | BODY MASS INDEX: 26.62 KG/M2 | SYSTOLIC BLOOD PRESSURE: 169 MMHG | HEART RATE: 58 BPM

## 2025-08-06 DIAGNOSIS — I10 PRIMARY HYPERTENSION: ICD-10-CM

## 2025-08-06 LAB
AORTIC SIZE INDEX (SOV): 1.5 CM/M2
AORTIC SIZE INDEX: 1.7 CM/M2
ASCENDING AORTA: 2.8 CM
AV AREA BY CONTINUOUS VTI: 2.5 CM2
AV INDEX (PROSTH): 0.98
AV LVOT MEAN GRADIENT: 3 MMHG
AV LVOT PEAK GRADIENT: 7 MMHG
AV MEAN GRADIENT: 4 MMHG
AV PEAK GRADIENT: 8 MMHG
AV REGURGITATION PRESSURE HALF TIME: 745 MS
AV VALVE AREA BY VELOCITY RATIO: 2.4 CM²
AV VALVE AREA: 2.5 CM2
AV VELOCITY RATIO: 0.93
BSA FOR ECHO PROCEDURE: 1.66 M2
CV ECHO LV RWT: 0.45 CM
DOP CALC AO PEAK VEL: 1.4 M/S
DOP CALC AO VTI: 30.3 CM
DOP CALC LVOT AREA: 2.5 CM2
DOP CALC LVOT DIAMETER: 1.8 CM
DOP CALC LVOT PEAK VEL: 1.3 M/S
DOP CALC RVOT AREA: 2.4 CM2
DOP CALC RVOT DIAMETER: 1.75 CM
DOP CALCLVOT PEAK VEL VTI: 29.6 CM
E WAVE DECELERATION TIME: 271 MS
E/A RATIO: 0.94
E/E' RATIO: 13 M/S
ECHO EF ESTIMATED: 61 %
ECHO LV POSTERIOR WALL: 1 CM (ref 0.6–1.1)
FRACTIONAL SHORTENING: 31.8 % (ref 28–44)
HR MV ECHO: 58 BPM
INTERVENTRICULAR SEPTUM: 0.7 CM (ref 0.6–1.1)
IVC DIAMETER: 1.39 CM
IVRT: 80 MS
LA MAJOR: 5.2 CM
LA MINOR: 5.2 CM
LA WIDTH: 3.9 CM
LEFT ATRIUM SIZE: 3.8 CM
LEFT ATRIUM VOLUME INDEX MOD: 27 ML/M2
LEFT ATRIUM VOLUME INDEX: 40 ML/M2
LEFT ATRIUM VOLUME MOD: 44 ML
LEFT ATRIUM VOLUME: 66 CM3
LEFT INTERNAL DIMENSION IN SYSTOLE: 3 CM (ref 2.1–4)
LEFT VENTRICLE DIASTOLIC VOLUME INDEX: 53.37 ML/M2
LEFT VENTRICLE DIASTOLIC VOLUME: 87 ML
LEFT VENTRICLE MASS INDEX: 72.7 G/M2
LEFT VENTRICLE SYSTOLIC VOLUME INDEX: 20.9 ML/M2
LEFT VENTRICLE SYSTOLIC VOLUME: 34 ML
LEFT VENTRICULAR INTERNAL DIMENSION IN DIASTOLE: 4.4 CM (ref 3.5–6)
LEFT VENTRICULAR MASS: 118.6 G
LV LATERAL E/E' RATIO: 11.6
LV SEPTAL E/E' RATIO: 15.5
Lab: 1.6 CM/M
Lab: 1.8 CM/M
MR PISA EROA: 0.05 CM2
MV A" WAVE DURATION": 76.12 MS
MV MEAN GRADIENT: 3 MMHG
MV PEAK A VEL: 0.99 M/S
MV PEAK E VEL: 0.93 M/S
MV PEAK GRADIENT: 5 MMHG
OHS CV CPX PATIENT HEIGHT IN: 61
OHS CV RV/LV RATIO: 0.66 CM
PISA MRMAX VEL: 6.35 M/S
PISA RADIUS: 0.39 CM
PISA TR MAX VEL: 2.7 M/S
PISA VN NYQUIST: 0.33 M/S
PULM VEIN A" WAVE DURATION": 76.12 MS
PULM VEIN S/D RATIO: 1.3
PULMONIC VEIN PEAK A VELOCITY: 0.3 M/S
PV PEAK D VEL: 0.37 M/S
PV PEAK S VEL: 0.48 M/S
RA MAJOR: 4.6 CM
RA PRESSURE ESTIMATED: 3 MMHG
RA WIDTH: 3.2 CM
RIGHT ATRIAL AREA: 12.2 CM2
RIGHT VENTRICLE DIASTOLIC BASEL DIMENSION: 2.9 CM
RV TB RVSP: 6 MMHG
RV TISSUE DOPPLER FREE WALL SYSTOLIC VELOCITY 1 (APICAL 4 CHAMBER VIEW): 18.72 CM/S
SINUS: 2.5 CM
STJ: 2.3 CM
TDI LATERAL: 0.08 M/S
TDI SEPTAL: 0.06 M/S
TDI: 0.07 M/S
TRICUSPID ANNULAR PLANE SYSTOLIC EXCURSION: 2.1 CM
TV PEAK GRADIENT: 30 MMHG
TV REST PULMONARY ARTERY PRESSURE: 32 MMHG
Z-SCORE OF LEFT VENTRICULAR DIMENSION IN END DIASTOLE: -0.44
Z-SCORE OF LEFT VENTRICULAR DIMENSION IN END SYSTOLE: 0.4

## 2025-08-06 PROCEDURE — 93306 TTE W/DOPPLER COMPLETE: CPT | Mod: 26,,, | Performed by: INTERNAL MEDICINE

## 2025-08-06 PROCEDURE — 93306 TTE W/DOPPLER COMPLETE: CPT

## 2025-08-08 ENCOUNTER — PATIENT MESSAGE (OUTPATIENT)
Dept: ORTHOPEDICS | Facility: CLINIC | Age: 70
End: 2025-08-08

## 2025-08-08 ENCOUNTER — OFFICE VISIT (OUTPATIENT)
Dept: ORTHOPEDICS | Facility: CLINIC | Age: 70
End: 2025-08-08
Payer: MEDICARE

## 2025-08-08 VITALS — WEIGHT: 141.13 LBS | BODY MASS INDEX: 26.65 KG/M2 | HEIGHT: 61 IN

## 2025-08-08 DIAGNOSIS — Z09 FOLLOW-UP EXAMINATION AFTER ORTHOPEDIC SURGERY: Primary | ICD-10-CM

## 2025-08-08 DIAGNOSIS — M19.071 ARTHRITIS OF FIRST METATARSOPHALANGEAL (MTP) JOINT OF RIGHT FOOT: ICD-10-CM

## 2025-08-08 PROCEDURE — 99213 OFFICE O/P EST LOW 20 MIN: CPT | Mod: PBBFAC | Performed by: ORTHOPAEDIC SURGERY

## 2025-08-08 PROCEDURE — 99999 PR PBB SHADOW E&M-EST. PATIENT-LVL III: CPT | Mod: PBBFAC,,, | Performed by: ORTHOPAEDIC SURGERY

## 2025-08-08 NOTE — PROGRESS NOTES
Karlie Carrion  Returns today for follow-up.  She is now about six weeks postop from Giacomo's neurectomy from a right 2nd intermetatarsal space.  She reports that her preoperative symptoms are improved.  She still has some soreness and some swelling.  She also states she is noticing that her big toe MTP arthritis is giving her a bit more pain.    Examination: The incision on the bottom of the right foot is well healed.  There is no tenderness about the incision or proximal to the incision.  She has functional but decreased motion of her right big toe with mild discomfort.    Impression:  1. Gooden's neurectomy right 2nd space, 06/25/2025        2. Arthritis of first metatarsophalangeal (MTP) joint of right foot          Recommendation: It is encouraging that her symptoms are improved at this point.  I still expect her to have some soreness and swelling from the surgery but she can progress her activities as tolerated.  I would recommend observant care for her big toe MTP arthritis at this point.    Follow-up as needed

## 2025-08-09 ENCOUNTER — PATIENT MESSAGE (OUTPATIENT)
Dept: ORTHOPEDICS | Facility: CLINIC | Age: 70
End: 2025-08-09
Payer: MEDICARE

## 2025-08-11 ENCOUNTER — PATIENT MESSAGE (OUTPATIENT)
Dept: SPORTS MEDICINE | Facility: CLINIC | Age: 70
End: 2025-08-11
Payer: MEDICARE

## 2025-08-26 ENCOUNTER — OFFICE VISIT (OUTPATIENT)
Dept: ORTHOPEDICS | Facility: CLINIC | Age: 70
End: 2025-08-26
Payer: MEDICARE

## 2025-08-26 VITALS — BODY MASS INDEX: 26.27 KG/M2 | WEIGHT: 139.13 LBS | HEIGHT: 61 IN

## 2025-08-26 DIAGNOSIS — M76.892 HIP ABDUCTOR TENDONITIS, LEFT: Primary | ICD-10-CM

## 2025-08-26 DIAGNOSIS — M70.62 GREATER TROCHANTERIC BURSITIS OF LEFT HIP: ICD-10-CM

## 2025-08-26 PROCEDURE — 99213 OFFICE O/P EST LOW 20 MIN: CPT | Mod: PBBFAC | Performed by: STUDENT IN AN ORGANIZED HEALTH CARE EDUCATION/TRAINING PROGRAM

## 2025-08-26 PROCEDURE — 99999 PR PBB SHADOW E&M-EST. PATIENT-LVL III: CPT | Mod: PBBFAC,,, | Performed by: STUDENT IN AN ORGANIZED HEALTH CARE EDUCATION/TRAINING PROGRAM

## (undated) DEVICE — UNDERGLOVES BIOGEL PI SIZE 8

## (undated) DEVICE — STOCKINETTE TUBULAR 2PL 6 X 4

## (undated) DEVICE — GAUZE SPONGE 4X4 12PLY

## (undated) DEVICE — BANDAGE MATRIX HK LOOP 4IN 5YD

## (undated) DEVICE — DRESSING XEROFORM NONADH 1X8IN

## (undated) DEVICE — SOL NACL IRR 1000ML BTL

## (undated) DEVICE — SUT ETHILON 3/0 18IN PS-1

## (undated) DEVICE — BANDAGE ESMARK ELASTIC ST 4X9

## (undated) DEVICE — TOURNIQUET SB QC SP 18X4IN

## (undated) DEVICE — DRAPE TOP 53X102IN

## (undated) DEVICE — BLADE MICRO REC SMALL CROSS

## (undated) DEVICE — BANDAGE MATRIX HK LOOP 6IN 5YD

## (undated) DEVICE — SPONGE GAUZE 16PLY 4X4

## (undated) DEVICE — GLOVE SENSICARE PI GRN 8

## (undated) DEVICE — GAUZE CNFRM STRL 4INX4.1YD

## (undated) DEVICE — COVER LIGHT HANDLE 80/CA

## (undated) DEVICE — SHOE CAST POST-OP MEN SBUNION

## (undated) DEVICE — ELECTRODE REM PLYHSV RETURN 9

## (undated) DEVICE — BLADE SURG #15 CARBON STEEL

## (undated) DEVICE — SHOE POST-OP VEL CLOS W/MD

## (undated) DEVICE — COVER CAMERA OPERATING ROOM

## (undated) DEVICE — PAD CAST SPECIALIST STRL 4

## (undated) DEVICE — CONTAINER SPECIMEN 4.5OZ STRL

## (undated) DEVICE — BLADE SAGITTAL FINE 5.5 X 18.5

## (undated) DEVICE — DRAPE T EXTRM SURG 121X128X90

## (undated) DEVICE — TRAY MINOR ORTHO OMC

## (undated) DEVICE — BANDAGE DERMACEA STRETCH 4X1IN

## (undated) DEVICE — SUT VICRYL 3-0 27 SH

## (undated) DEVICE — GLOVE BIOGEL PI MICRO SZ 7.5

## (undated) DEVICE — SPONGE COTTON TRAY 4X4IN

## (undated) DEVICE — MANIFOLD 4 PORT

## (undated) DEVICE — GLOVE SENSICARE PI MICRO 7.5

## (undated) DEVICE — SUT ETHILON 3-0 PS2 18 BLK

## (undated) DEVICE — GLOVE BIOGEL ORTHOPEDIC 7.5